# Patient Record
Sex: MALE | Race: WHITE | Employment: OTHER | URBAN - METROPOLITAN AREA
[De-identification: names, ages, dates, MRNs, and addresses within clinical notes are randomized per-mention and may not be internally consistent; named-entity substitution may affect disease eponyms.]

---

## 2017-10-19 ENCOUNTER — OFFICE VISIT (OUTPATIENT)
Dept: FAMILY MEDICINE CLINIC | Facility: CLINIC | Age: 57
End: 2017-10-19

## 2017-10-19 ENCOUNTER — LAB ENCOUNTER (OUTPATIENT)
Dept: LAB | Age: 57
End: 2017-10-19
Attending: FAMILY MEDICINE
Payer: COMMERCIAL

## 2017-10-19 ENCOUNTER — TELEPHONE (OUTPATIENT)
Dept: FAMILY MEDICINE CLINIC | Facility: CLINIC | Age: 57
End: 2017-10-19

## 2017-10-19 VITALS
SYSTOLIC BLOOD PRESSURE: 130 MMHG | OXYGEN SATURATION: 99 % | RESPIRATION RATE: 20 BRPM | TEMPERATURE: 99 F | HEART RATE: 80 BPM | DIASTOLIC BLOOD PRESSURE: 86 MMHG | HEIGHT: 71 IN | WEIGHT: 216 LBS | BODY MASS INDEX: 30.24 KG/M2

## 2017-10-19 DIAGNOSIS — Z13.0 SCREENING FOR ENDOCRINE, NUTRITIONAL, METABOLIC AND IMMUNITY DISORDER: ICD-10-CM

## 2017-10-19 DIAGNOSIS — Z13.21 SCREENING FOR ENDOCRINE, NUTRITIONAL, METABOLIC AND IMMUNITY DISORDER: ICD-10-CM

## 2017-10-19 DIAGNOSIS — Z13.29 SCREENING FOR ENDOCRINE, NUTRITIONAL, METABOLIC AND IMMUNITY DISORDER: ICD-10-CM

## 2017-10-19 DIAGNOSIS — Z13.228 SCREENING FOR ENDOCRINE, NUTRITIONAL, METABOLIC AND IMMUNITY DISORDER: ICD-10-CM

## 2017-10-19 DIAGNOSIS — Z00.00 ROUTINE GENERAL MEDICAL EXAMINATION AT A HEALTH CARE FACILITY: Primary | ICD-10-CM

## 2017-10-19 PROBLEM — I10 BENIGN ESSENTIAL HTN: Status: ACTIVE | Noted: 2017-10-19

## 2017-10-19 PROCEDURE — 36415 COLL VENOUS BLD VENIPUNCTURE: CPT | Performed by: FAMILY MEDICINE

## 2017-10-19 PROCEDURE — 80061 LIPID PANEL: CPT | Performed by: FAMILY MEDICINE

## 2017-10-19 PROCEDURE — 84153 ASSAY OF PSA TOTAL: CPT | Performed by: FAMILY MEDICINE

## 2017-10-19 PROCEDURE — 99386 PREV VISIT NEW AGE 40-64: CPT | Performed by: FAMILY MEDICINE

## 2017-10-19 PROCEDURE — 80050 GENERAL HEALTH PANEL: CPT | Performed by: FAMILY MEDICINE

## 2017-10-19 RX ORDER — NEBIVOLOL 5 MG/1
5 TABLET ORAL DAILY
Qty: 52 TABLET | Refills: 0 | COMMUNITY
Start: 2017-10-19 | End: 2017-10-23 | Stop reason: ALTCHOICE

## 2017-10-19 RX ORDER — NEBIVOLOL 5 MG/1
5 TABLET ORAL DAILY
Qty: 90 TABLET | Refills: 3 | Status: SHIPPED | OUTPATIENT
Start: 2017-10-19 | End: 2017-10-23 | Stop reason: ALTCHOICE

## 2017-10-19 RX ORDER — ASPIRIN 81 MG/1
81 TABLET ORAL DAILY
Qty: 30 TABLET | Refills: 11 | COMMUNITY
Start: 2017-10-19 | End: 2021-05-04

## 2017-10-19 NOTE — TELEPHONE ENCOUNTER
Noted. Will courtney for f/u to request records be sent tomorrow. Dr. Teddy Lutz M.D.     WebsiteDirections  Surgeon in [facility]  Address: [address] [place] [address] [address] [place], [address]  Phone: [phone]

## 2017-10-19 NOTE — PATIENT INSTRUCTIONS
Dr. Mateo Perez DDS. Address: [address] [place], [place] [address] [place], [address]   Phone: [phone]  High Blood Pressure, New, Begin Treatment  Your blood pressure was high enough today to start treatment with medicines.  Often health care providers don’t ¨ Don’t eat foods that have a lot of salt. These include olives, pickles, smoked meats, and salted potato chips. ¨ Don’t add salt to your food at the table. ¨ Use only small amounts of salt when cooking.   ¨ Review food labels to track how much salt is in The American Heart Association recommends the following guidelines for home blood pressure monitoring:  · Don't smoke or drink coffee for 30 minutes before taking your blood pressure.   · Go to the bathroom before the test.  · Relax for 5 minutes before natasha Because a new blood pressure medicine was started today, it’s important that you have your blood pressure rechecked. This is to make sure that the medicine is working and that you have no serious side effects. Keep all your follow up appointments.  Write do If you have high blood pressure, you may not have any symptoms. If you do have symptoms, they may include headache, dizziness, changes in your vision, chest pain, and shortness of breath.  But even without symptoms, high blood pressure that’s not treated ra · Don’t take medicines that stimulate the heart. This includes many over-the-counter cold and sinus decongestant pills and sprays, as well as diet pills. Check the warnings about hypertension on the label.  Before buying any over-the-counter medicines or peck · Sit with your back supported (don't sit on a couch or soft chair); keep your feet on the floor uncrossed. Place your arm on a solid flat surface (like a table) with the upper part of the arm at heart level.  Place the middle of the cuff directly above the · You have problems speaking or seeing   Date Last Reviewed: 12/1/2016  © 6556-1796 [employer]. [address] [facility], [address] [place] [address]. All rights reserved. This information is not intended as a substitute for professional medical care.  A Avoid: Processed cheese and cheese spreads;  Roquefort, Camembert, and cottage cheese; buttermilk, instant breakfast drink  Desserts  Ok: Ice cream, frozen yogurt, juice bars, gelatin, cookies and pies, sugar, honey, jelly, hard candy  Avoid: Most pies, cak

## 2017-10-19 NOTE — TELEPHONE ENCOUNTER
Pt had his colonoscopy at [facility] w/Dr. Andree Schaffer. Wife stated pt gave wrong info at today's visit.

## 2017-10-19 NOTE — PROGRESS NOTES
Virgen Kim is a 62year old male who presents for a complete physical exam.   HPI:      Patient presents with:  Physical      Patient is present for complete physical. Feels very well. Up to date with dental visits. No hearing problems.  Vaccinations: complaint  PSYCH: no symptoms of depression or anxiety, no insomnia.   HEMATOLOGY: denies hx anemia; denies bruising or excessive bleeding  ENDOCRINE: denies excessive thirst or urination; denies unexpected wt gain or wt loss  ALLERGY/IMM.: denies food or s and agrees to the plan. The patient is asked to return for CPX in 1 year.

## 2017-10-20 ENCOUNTER — TELEPHONE (OUTPATIENT)
Dept: FAMILY MEDICINE CLINIC | Facility: CLINIC | Age: 57
End: 2017-10-20

## 2017-10-20 NOTE — TELEPHONE ENCOUNTER
Discussed results with patient and diet to help control his cholesterol. Patient voiced understanding and had no further questions.

## 2017-10-20 NOTE — TELEPHONE ENCOUNTER
Please contact below Specialist to request pt's Colonoscopy records per Dr. Beatriz Feldman request.  Thank you.

## 2017-10-20 NOTE — TELEPHONE ENCOUNTER
----- Message from Frankie Garcia MD sent at 10/20/2017  7:58 AM CDT -----  Normal results except borderline lipids. Low lipids diet recommended.

## 2017-10-23 ENCOUNTER — TELEPHONE (OUTPATIENT)
Dept: FAMILY MEDICINE CLINIC | Facility: CLINIC | Age: 57
End: 2017-10-23

## 2017-10-23 RX ORDER — LISINOPRIL 20 MG/1
20 TABLET ORAL DAILY
Qty: 90 TABLET | Refills: 4 | Status: SHIPPED | OUTPATIENT
Start: 2017-10-23 | End: 2018-10-26

## 2017-10-23 NOTE — TELEPHONE ENCOUNTER
VM left for patient indicating that prescription changed to Lisinopril 20mg once a day. Script sent to [facility].

## 2017-10-23 NOTE — TELEPHONE ENCOUNTER
Bystolic 5 mg will cost the patient $100 each month is there something this can be changed to that's cost effective for the patient?  Please advise

## 2017-10-24 ENCOUNTER — MED REC SCAN ONLY (OUTPATIENT)
Dept: FAMILY MEDICINE CLINIC | Facility: CLINIC | Age: 57
End: 2017-10-24

## 2017-11-02 NOTE — TELEPHONE ENCOUNTER
Spoke with Breezy Norton at that office and she requested a cover sheet with the information be faxed to their office at [phone]  Awaiting response fax went through

## 2017-11-10 ENCOUNTER — MED REC SCAN ONLY (OUTPATIENT)
Dept: FAMILY MEDICINE CLINIC | Facility: CLINIC | Age: 57
End: 2017-11-10

## 2018-02-01 ENCOUNTER — TELEPHONE (OUTPATIENT)
Dept: FAMILY MEDICINE CLINIC | Facility: CLINIC | Age: 58
End: 2018-02-01

## 2018-02-01 RX ORDER — KETOCONAZOLE 20 MG/G
1 CREAM TOPICAL 2 TIMES DAILY
Qty: 60 G | Refills: 1 | Status: SHIPPED | OUTPATIENT
Start: 2018-02-01 | End: 2018-06-22 | Stop reason: ALTCHOICE

## 2018-02-01 RX ORDER — CLOBETASOL PROPIONATE 0.5 MG/G
1 OINTMENT TOPICAL 2 TIMES DAILY
Qty: 60 G | Refills: 1 | Status: SHIPPED | OUTPATIENT
Start: 2018-02-01 | End: 2018-06-22 | Stop reason: ALTCHOICE

## 2018-02-01 NOTE — TELEPHONE ENCOUNTER
Mild, itchy rash in the elbow. Signed Prescriptions Disp Refills    Clobetasol Propionate 0.05 % External Ointment 60 g 1      Sig: Apply 1 Application topically 2 (two) times daily.         Authorizing Provider: Meme Milian      ketoconazole 2 % Ex

## 2018-06-23 ENCOUNTER — TELEPHONE (OUTPATIENT)
Dept: FAMILY MEDICINE CLINIC | Facility: CLINIC | Age: 58
End: 2018-06-23

## 2018-06-25 NOTE — PROGRESS NOTES
Patient presents with:  Pain: Lt. side of head neck region x 2 months and Lt. abdominal pain         HPI:  Patient complains insomnia. Problems with fall asleep and stay asleep. Started gradualy Last:months.   Stress makes it worse, pt tried:OCT meds did n issues. No tinnitus  NECK: no pain  CHEST: no chest pains  NEURO: no seizures, no confusion, no weakness, no abnormal sensation, no vertigo  GI: no nausea or vomiting.   PSYCH: no depression        EXAM:  /72   Pulse 72   Temp 98.7 °F (37.1 °C) (Oral)

## 2018-08-31 ENCOUNTER — TELEPHONE (OUTPATIENT)
Dept: FAMILY MEDICINE CLINIC | Facility: CLINIC | Age: 58
End: 2018-08-31

## 2018-08-31 NOTE — TELEPHONE ENCOUNTER
Spoke with Dr. Sara Howell with orders to call patients pharmacy for Rx. Xanaz 0.5mg. Dose (1 tob po d prn) #30 tabs with 1 refill. The Pharmacy was contacted and patient was notified.

## 2018-10-26 RX ORDER — LISINOPRIL 20 MG/1
TABLET ORAL
Qty: 90 TABLET | Refills: 0 | Status: SHIPPED | OUTPATIENT
Start: 2018-10-26 | End: 2019-10-07

## 2019-02-28 ENCOUNTER — TELEPHONE (OUTPATIENT)
Dept: FAMILY MEDICINE CLINIC | Facility: CLINIC | Age: 59
End: 2019-02-28

## 2019-02-28 RX ORDER — ALPRAZOLAM 0.25 MG/1
0.25 TABLET ORAL 2 TIMES DAILY PRN
Qty: 30 TABLET | Refills: 5 | Status: SHIPPED
Start: 2019-02-28 | End: 2019-12-06

## 2019-04-01 ENCOUNTER — TELEPHONE (OUTPATIENT)
Dept: FAMILY MEDICINE CLINIC | Facility: CLINIC | Age: 59
End: 2019-04-01

## 2019-04-01 RX ORDER — METHYLPREDNISOLONE 4 MG/1
TABLET ORAL
Qty: 1 KIT | Refills: 0 | Status: SHIPPED | OUTPATIENT
Start: 2019-04-01 | End: 2020-12-17

## 2019-04-01 RX ORDER — DEXTROMETHORPHAN HYDROBROMIDE AND PROMETHAZINE HYDROCHLORIDE 15; 6.25 MG/5ML; MG/5ML
5 SYRUP ORAL 4 TIMES DAILY PRN
Qty: 120 ML | Refills: 0 | Status: SHIPPED | OUTPATIENT
Start: 2019-04-01 | End: 2019-04-08

## 2019-10-08 RX ORDER — ALPRAZOLAM 0.5 MG/1
TABLET ORAL
Qty: 30 TABLET | Refills: 0 | Status: SHIPPED | OUTPATIENT
Start: 2019-10-08 | End: 2020-03-19

## 2019-10-08 RX ORDER — ZOLPIDEM TARTRATE 10 MG/1
TABLET ORAL
Qty: 30 TABLET | Refills: 0 | Status: SHIPPED | OUTPATIENT
Start: 2019-10-08 | End: 2021-05-04

## 2019-10-08 RX ORDER — LISINOPRIL 20 MG/1
TABLET ORAL
Qty: 90 TABLET | Refills: 0 | Status: SHIPPED | OUTPATIENT
Start: 2019-10-08 | End: 2020-12-17

## 2019-10-08 NOTE — TELEPHONE ENCOUNTER
Please contact patient and schedule OV. Patient has not been seen since 2018. Patient is overdue. Please schedule patient.

## 2019-10-08 NOTE — TELEPHONE ENCOUNTER
Spoke with patient informed him medications were approved and sent to [facility] with no refills.  Patient scheduled his physical appointment for 10/17/19

## 2019-12-09 RX ORDER — ALPRAZOLAM 0.25 MG/1
TABLET ORAL
Qty: 30 TABLET | Refills: 0 | Status: SHIPPED | OUTPATIENT
Start: 2019-12-09 | End: 2021-05-04 | Stop reason: DRUGHIGH

## 2019-12-09 RX ORDER — LISINOPRIL 20 MG/1
TABLET ORAL
Qty: 90 TABLET | Refills: 0 | Status: SHIPPED | OUTPATIENT
Start: 2019-12-09 | End: 2020-03-19

## 2019-12-09 NOTE — TELEPHONE ENCOUNTER
Medication(s) to Refill:   Requested Prescriptions     Pending Prescriptions Disp Refills   • ALPRAZOLAM 0.25 MG Oral Tab [Pharmacy Med Name: ALPRAZOLAM 0.25MG TABLETS] 30 tablet 0     Sig: TAKE 1 TABLET BY MOUTH TWICE DAILY AS NEEDED FOR SLEEP   • LISINOP

## 2020-03-19 RX ORDER — LISINOPRIL 20 MG/1
TABLET ORAL
Qty: 90 TABLET | Refills: 0 | Status: SHIPPED | OUTPATIENT
Start: 2020-03-19 | End: 2020-06-15

## 2020-03-19 RX ORDER — ALPRAZOLAM 0.5 MG/1
TABLET ORAL
Qty: 30 TABLET | Refills: 0 | Status: SHIPPED | OUTPATIENT
Start: 2020-03-19 | End: 2020-10-28

## 2020-03-19 NOTE — TELEPHONE ENCOUNTER
Medication(s) to Refill:   Requested Prescriptions     Pending Prescriptions Disp Refills   • LISINOPRIL 20 MG Oral Tab [Pharmacy Med Name: LISINOPRIL 20MG TABLETS] 90 tablet 0     Sig: TAKE 1 TABLET(20 MG) BY MOUTH DAILY   • ALPRAZOLAM 0.5 MG Oral Tab [Ph

## 2020-06-12 RX ORDER — LISINOPRIL 20 MG/1
TABLET ORAL
Qty: 90 TABLET | Refills: 0 | OUTPATIENT
Start: 2020-06-12

## 2020-06-15 RX ORDER — LISINOPRIL 20 MG/1
TABLET ORAL
Qty: 90 TABLET | Refills: 0 | Status: SHIPPED | OUTPATIENT
Start: 2020-06-15 | End: 2020-09-14

## 2020-06-15 NOTE — TELEPHONE ENCOUNTER
Medication(s) to Refill:   Requested Prescriptions     Pending Prescriptions Disp Refills   • LISINOPRIL 20 MG Oral Tab [Pharmacy Med Name: LISINOPRIL 20MG TABLETS] 90 tablet 0     Sig: TAKE 1 TABLET BY MOUTH DAILY         Reason for Medication Refill bein

## 2020-09-14 RX ORDER — LISINOPRIL 20 MG/1
TABLET ORAL
Qty: 90 TABLET | Refills: 0 | Status: SHIPPED | OUTPATIENT
Start: 2020-09-14 | End: 2020-12-17

## 2020-10-28 NOTE — TELEPHONE ENCOUNTER
Medication(s) to Refill:   Requested Prescriptions     Pending Prescriptions Disp Refills   • ALPRAZolam 0.5 MG Oral Tab 30 tablet 0     Sig: Take 1 tablet (0.5 mg total) by mouth daily as needed.          Reason for Medication Refill being sent to Provider

## 2020-11-02 RX ORDER — ALPRAZOLAM 0.5 MG/1
0.5 TABLET ORAL
Qty: 30 TABLET | Refills: 0 | Status: SHIPPED | OUTPATIENT
Start: 2020-11-02 | End: 2020-12-17

## 2020-12-14 RX ORDER — LISINOPRIL 20 MG/1
20 TABLET ORAL DAILY
Qty: 30 TABLET | Refills: 0 | OUTPATIENT
Start: 2020-12-14

## 2020-12-17 ENCOUNTER — VIRTUAL PHONE E/M (OUTPATIENT)
Dept: FAMILY MEDICINE CLINIC | Facility: CLINIC | Age: 60
End: 2020-12-17

## 2020-12-17 DIAGNOSIS — F41.1 GAD (GENERALIZED ANXIETY DISORDER): ICD-10-CM

## 2020-12-17 DIAGNOSIS — I10 HYPERTENSION, UNSPECIFIED TYPE: Primary | ICD-10-CM

## 2020-12-17 PROCEDURE — 99441 PHONE E/M BY PHYS 5-10 MIN: CPT | Performed by: NURSE PRACTITIONER

## 2020-12-17 RX ORDER — ALPRAZOLAM 0.5 MG/1
0.5 TABLET ORAL
Qty: 30 TABLET | Refills: 0 | Status: SHIPPED | OUTPATIENT
Start: 2020-12-17 | End: 2021-05-07

## 2020-12-17 RX ORDER — LISINOPRIL 20 MG/1
20 TABLET ORAL DAILY
Qty: 90 TABLET | Refills: 3 | Status: SHIPPED | OUTPATIENT
Start: 2020-12-17 | End: 2021-03-10 | Stop reason: SINTOL

## 2020-12-17 NOTE — PROGRESS NOTES
Virtual Telephone Check-In    Kristi Mata verbally consents to a Virtual/Telephone Check-In visit on 12/17/20. Patient has been referred to the [facility] website at [email or web address] to review the yearly Consent to Treat document.     Patient unders surgical history on file.    Social History:    Social History    Tobacco Use      Smoking status: Former Smoker        Years: 30.00      Smokeless tobacco: Never Used    Alcohol use: Yes      Comment: social    Drug use: No        REVIEW OF SYSTEMS:   GENE

## 2021-01-01 NOTE — TELEPHONE ENCOUNTER
Bella Karissa is here for Follow up evaluation of potential COVID-19 exposure.        You may return to work with no restrictions.                Off work end date: 01/01/21          TM received Negative COVID-19 test results from [facility] on 12/30 and emailed them to [email or web address]. TM states symptoms have improved. TM cleared to RTW.    Antigens #2 and #3 scheduled for 1/2 and 1/5 at [facility]. Emailed TM and  letter.     14 day Symptom Tracker end date: 1/11    F/U 1/3        Medication(s) to Refill:   Requested Prescriptions     Pending Prescriptions Disp Refills   • ALPRAZOLAM 0.5 MG Oral Tab [Pharmacy Med Name: ALPRAZOLAM 0.5MG TABLETS] 30 tablet 0     Sig: TAKE 1 TABLET BY MOUTH DAILY AS NEEDED   • ZOLPIDEM TARTRATE 10 MG O

## 2021-02-10 ENCOUNTER — VIRTUAL PHONE E/M (OUTPATIENT)
Dept: FAMILY MEDICINE CLINIC | Facility: CLINIC | Age: 61
End: 2021-02-10
Payer: COMMERCIAL

## 2021-02-10 DIAGNOSIS — Z20.822 SUSPECTED COVID-19 VIRUS INFECTION: Primary | ICD-10-CM

## 2021-02-10 DIAGNOSIS — Z13.21 ENCOUNTER FOR VITAMIN DEFICIENCY SCREENING: ICD-10-CM

## 2021-02-10 DIAGNOSIS — I10 HYPERTENSION, UNSPECIFIED TYPE: ICD-10-CM

## 2021-02-10 DIAGNOSIS — R05.9 COUGH: ICD-10-CM

## 2021-02-10 PROCEDURE — 99212 OFFICE O/P EST SF 10 MIN: CPT | Performed by: NURSE PRACTITIONER

## 2021-02-10 RX ORDER — BENZONATATE 200 MG/1
200 CAPSULE ORAL 3 TIMES DAILY PRN
Qty: 60 CAPSULE | Refills: 0 | Status: SHIPPED | OUTPATIENT
Start: 2021-02-10 | End: 2021-05-04 | Stop reason: ALTCHOICE

## 2021-02-10 RX ORDER — AZITHROMYCIN 250 MG/1
TABLET, FILM COATED ORAL
Qty: 6 TABLET | Refills: 0 | Status: SHIPPED | OUTPATIENT
Start: 2021-02-10 | End: 2021-02-15

## 2021-02-10 NOTE — PROGRESS NOTES
Virtual Telephone Check-In    Avis Ear verbally consents to a Virtual/Telephone Check-In visit on 02/10/21. Patient has been referred to the [facility] website at [email or web address] to review the yearly Consent to Treat document.     Patient unders ASSESSMENT/ PLAN:       Diagnoses and all orders for this visit:    Suspected COVID-19 virus infection  -     azithromycin (ZITHROMAX Z-EVELIA) 250 MG Oral Tab;  Take 2 tablets (500 mg total) by mouth daily for 1 day, THEN 1 tablet (250 mg total) irasema

## 2021-02-11 ENCOUNTER — LAB ENCOUNTER (OUTPATIENT)
Dept: LAB | Age: 61
End: 2021-02-11
Attending: NURSE PRACTITIONER
Payer: COMMERCIAL

## 2021-02-11 ENCOUNTER — HOSPITAL ENCOUNTER (OUTPATIENT)
Dept: GENERAL RADIOLOGY | Age: 61
Discharge: HOME | End: 2021-02-11
Attending: NURSE PRACTITIONER
Payer: COMMERCIAL

## 2021-02-11 DIAGNOSIS — Z13.21 ENCOUNTER FOR VITAMIN DEFICIENCY SCREENING: ICD-10-CM

## 2021-02-11 DIAGNOSIS — I10 HYPERTENSION, UNSPECIFIED TYPE: ICD-10-CM

## 2021-02-11 DIAGNOSIS — R05.9 COUGH: ICD-10-CM

## 2021-02-11 LAB
ALBUMIN SERPL-MCNC: 3.7 G/DL (ref 3.4–5)
ALBUMIN/GLOB SERPL: 1 {RATIO} (ref 1–2)
ALP LIVER SERPL-CCNC: 73 U/L
ALT SERPL-CCNC: 64 U/L
ANION GAP SERPL CALC-SCNC: 8 MMOL/L (ref 0–18)
AST SERPL-CCNC: 41 U/L (ref 15–37)
BASOPHILS # BLD AUTO: 0.02 X10(3) UL (ref 0–0.2)
BASOPHILS NFR BLD AUTO: 0.3 %
BILIRUB SERPL-MCNC: 0.8 MG/DL (ref 0.1–2)
BUN BLD-MCNC: 14 MG/DL (ref 7–18)
BUN/CREAT SERPL: 16.1 (ref 10–20)
CALCIUM BLD-MCNC: 8.8 MG/DL (ref 8.5–10.1)
CHLORIDE SERPL-SCNC: 108 MMOL/L (ref 98–112)
CO2 SERPL-SCNC: 21 MMOL/L (ref 21–32)
CREAT BLD-MCNC: 0.87 MG/DL
DEPRECATED RDW RBC AUTO: 43.9 FL (ref 35.1–46.3)
EOSINOPHIL # BLD AUTO: 0.3 X10(3) UL (ref 0–0.7)
EOSINOPHIL NFR BLD AUTO: 4.8 %
ERYTHROCYTE [DISTWIDTH] IN BLOOD BY AUTOMATED COUNT: 12.8 % (ref 11–15)
FOLATE SERPL-MCNC: 15.3 NG/ML (ref 8.7–?)
GLOBULIN PLAS-MCNC: 3.7 G/DL (ref 2.8–4.4)
GLUCOSE BLD-MCNC: 99 MG/DL (ref 70–99)
HCT VFR BLD AUTO: 48.4 %
HGB BLD-MCNC: 17.1 G/DL
IMM GRANULOCYTES # BLD AUTO: 0.01 X10(3) UL (ref 0–1)
IMM GRANULOCYTES NFR BLD: 0.2 %
LYMPHOCYTES # BLD AUTO: 1.21 X10(3) UL (ref 1–4)
LYMPHOCYTES NFR BLD AUTO: 19.2 %
M PROTEIN MFR SERPL ELPH: 7.4 G/DL (ref 6.4–8.2)
MCH RBC QN AUTO: 33.3 PG (ref 26–34)
MCHC RBC AUTO-ENTMCNC: 35.3 G/DL (ref 31–37)
MCV RBC AUTO: 94.2 FL
MONOCYTES # BLD AUTO: 0.55 X10(3) UL (ref 0.1–1)
MONOCYTES NFR BLD AUTO: 8.7 %
NEUTROPHILS # BLD AUTO: 4.2 X10 (3) UL (ref 1.5–7.7)
NEUTROPHILS # BLD AUTO: 4.2 X10(3) UL (ref 1.5–7.7)
NEUTROPHILS NFR BLD AUTO: 66.8 %
OSMOLALITY SERPL CALC.SUM OF ELEC: 285 MOSM/KG (ref 275–295)
PATIENT FASTING Y/N/NP: YES
PLATELET # BLD AUTO: 208 10(3)UL (ref 150–450)
POTASSIUM SERPL-SCNC: 4.1 MMOL/L (ref 3.5–5.1)
RBC # BLD AUTO: 5.14 X10(6)UL
SODIUM SERPL-SCNC: 137 MMOL/L (ref 136–145)
TSI SER-ACNC: 1.25 MIU/ML (ref 0.36–3.74)
VIT B12 SERPL-MCNC: 291 PG/ML (ref 193–986)
VIT D+METAB SERPL-MCNC: 14.9 NG/ML (ref 30–100)
WBC # BLD AUTO: 6.3 X10(3) UL (ref 4–11)

## 2021-02-11 PROCEDURE — 36415 COLL VENOUS BLD VENIPUNCTURE: CPT

## 2021-02-11 PROCEDURE — 82306 VITAMIN D 25 HYDROXY: CPT

## 2021-02-11 PROCEDURE — 84443 ASSAY THYROID STIM HORMONE: CPT

## 2021-02-11 PROCEDURE — 80053 COMPREHEN METABOLIC PANEL: CPT

## 2021-02-11 PROCEDURE — 82746 ASSAY OF FOLIC ACID SERUM: CPT

## 2021-02-11 PROCEDURE — 71046 X-RAY EXAM CHEST 2 VIEWS: CPT | Performed by: NURSE PRACTITIONER

## 2021-02-11 PROCEDURE — 82607 VITAMIN B-12: CPT

## 2021-02-11 PROCEDURE — 85025 COMPLETE CBC W/AUTO DIFF WBC: CPT

## 2021-02-12 ENCOUNTER — TELEPHONE (OUTPATIENT)
Dept: FAMILY MEDICINE CLINIC | Facility: CLINIC | Age: 61
End: 2021-02-12

## 2021-02-12 LAB — SARS-COV-2 RNA RESP QL NAA+PROBE: NOT DETECTED

## 2021-02-12 NOTE — TELEPHONE ENCOUNTER
----- Message from TRISHA Shaw sent at 2/11/2021 11:51 AM CST -----  Normal Chest Xray -no acute findings

## 2021-02-12 NOTE — TELEPHONE ENCOUNTER
Called pt and left vm of chest xray results. Instructed to contact office with any questions. Office number provided.

## 2021-02-15 ENCOUNTER — TELEPHONE (OUTPATIENT)
Dept: FAMILY MEDICINE CLINIC | Facility: CLINIC | Age: 61
End: 2021-02-15

## 2021-02-15 NOTE — TELEPHONE ENCOUNTER
Patient informed of results and instructions below. Patient states understanding and did not have any additional questions or concerns. Patient was sent to phone room to schedule Vitamin B12 injections.

## 2021-02-15 NOTE — TELEPHONE ENCOUNTER
----- Message from TRISHA Busby sent at 2/15/2021  7:58 AM CST -----  Vitamin D deficiency -sent new prescription for vitamin D -take it once per week , Vitamin B12 low -can start injections  In the office - daily x 5 days , then weekly x 4 weeks ,

## 2021-02-18 ENCOUNTER — NURSE ONLY (OUTPATIENT)
Dept: FAMILY MEDICINE CLINIC | Facility: CLINIC | Age: 61
End: 2021-02-18
Payer: COMMERCIAL

## 2021-02-18 DIAGNOSIS — E53.8 B12 DEFICIENCY: Primary | ICD-10-CM

## 2021-02-18 PROCEDURE — 96372 THER/PROPH/DIAG INJ SC/IM: CPT | Performed by: NURSE PRACTITIONER

## 2021-02-18 RX ORDER — CYANOCOBALAMIN 1000 UG/ML
1000 INJECTION INTRAMUSCULAR; SUBCUTANEOUS ONCE
Status: COMPLETED | OUTPATIENT
Start: 2021-02-18 | End: 2021-02-18

## 2021-02-18 RX ADMIN — CYANOCOBALAMIN 1000 MCG: 1000 INJECTION INTRAMUSCULAR; SUBCUTANEOUS at 14:29:00

## 2021-02-19 ENCOUNTER — NURSE ONLY (OUTPATIENT)
Dept: FAMILY MEDICINE CLINIC | Facility: CLINIC | Age: 61
End: 2021-02-19
Payer: COMMERCIAL

## 2021-02-19 DIAGNOSIS — E53.8 B12 DEFICIENCY: Primary | ICD-10-CM

## 2021-02-19 PROCEDURE — 96372 THER/PROPH/DIAG INJ SC/IM: CPT | Performed by: EMERGENCY MEDICINE

## 2021-02-19 RX ORDER — CYANOCOBALAMIN 1000 UG/ML
1000 INJECTION INTRAMUSCULAR; SUBCUTANEOUS ONCE
Status: COMPLETED | OUTPATIENT
Start: 2021-02-19 | End: 2021-02-19

## 2021-02-19 RX ADMIN — CYANOCOBALAMIN 1000 MCG: 1000 INJECTION INTRAMUSCULAR; SUBCUTANEOUS at 14:48:00

## 2021-02-22 ENCOUNTER — NURSE ONLY (OUTPATIENT)
Dept: FAMILY MEDICINE CLINIC | Facility: CLINIC | Age: 61
End: 2021-02-22
Payer: COMMERCIAL

## 2021-02-22 DIAGNOSIS — E53.8 B12 DEFICIENCY: Primary | ICD-10-CM

## 2021-02-22 PROCEDURE — 96372 THER/PROPH/DIAG INJ SC/IM: CPT | Performed by: FAMILY MEDICINE

## 2021-02-22 RX ORDER — CYANOCOBALAMIN 1000 UG/ML
1000 INJECTION INTRAMUSCULAR; SUBCUTANEOUS ONCE
Status: COMPLETED | OUTPATIENT
Start: 2021-02-22 | End: 2021-02-22

## 2021-02-22 RX ADMIN — CYANOCOBALAMIN 1000 MCG: 1000 INJECTION INTRAMUSCULAR; SUBCUTANEOUS at 14:21:00

## 2021-02-23 ENCOUNTER — NURSE ONLY (OUTPATIENT)
Dept: FAMILY MEDICINE CLINIC | Facility: CLINIC | Age: 61
End: 2021-02-23
Payer: COMMERCIAL

## 2021-02-23 DIAGNOSIS — E53.8 B12 DEFICIENCY: Primary | ICD-10-CM

## 2021-02-23 PROCEDURE — 96372 THER/PROPH/DIAG INJ SC/IM: CPT | Performed by: FAMILY MEDICINE

## 2021-02-23 RX ORDER — CYANOCOBALAMIN 1000 UG/ML
1000 INJECTION INTRAMUSCULAR; SUBCUTANEOUS ONCE
Status: COMPLETED | OUTPATIENT
Start: 2021-02-23 | End: 2021-02-23

## 2021-02-23 RX ADMIN — CYANOCOBALAMIN 1000 MCG: 1000 INJECTION INTRAMUSCULAR; SUBCUTANEOUS at 14:37:00

## 2021-02-24 ENCOUNTER — NURSE ONLY (OUTPATIENT)
Dept: FAMILY MEDICINE CLINIC | Facility: CLINIC | Age: 61
End: 2021-02-24
Payer: COMMERCIAL

## 2021-02-24 DIAGNOSIS — E53.8 B12 DEFICIENCY: Primary | ICD-10-CM

## 2021-02-24 PROCEDURE — 96372 THER/PROPH/DIAG INJ SC/IM: CPT | Performed by: FAMILY MEDICINE

## 2021-02-24 RX ORDER — CYANOCOBALAMIN 1000 UG/ML
1000 INJECTION INTRAMUSCULAR; SUBCUTANEOUS ONCE
Status: COMPLETED | OUTPATIENT
Start: 2021-02-24 | End: 2021-02-24

## 2021-02-24 RX ADMIN — CYANOCOBALAMIN 1000 MCG: 1000 INJECTION INTRAMUSCULAR; SUBCUTANEOUS at 14:13:00

## 2021-03-03 ENCOUNTER — NURSE ONLY (OUTPATIENT)
Dept: FAMILY MEDICINE CLINIC | Facility: CLINIC | Age: 61
End: 2021-03-03
Payer: COMMERCIAL

## 2021-03-03 DIAGNOSIS — E53.8 B12 DEFICIENCY: Primary | ICD-10-CM

## 2021-03-03 PROCEDURE — 96372 THER/PROPH/DIAG INJ SC/IM: CPT | Performed by: NURSE PRACTITIONER

## 2021-03-03 RX ORDER — CYANOCOBALAMIN 1000 UG/ML
1000 INJECTION INTRAMUSCULAR; SUBCUTANEOUS ONCE
Status: COMPLETED | OUTPATIENT
Start: 2021-03-03 | End: 2021-03-03

## 2021-03-03 RX ADMIN — CYANOCOBALAMIN 1000 MCG: 1000 INJECTION INTRAMUSCULAR; SUBCUTANEOUS at 14:36:00

## 2021-03-10 ENCOUNTER — VIRTUAL PHONE E/M (OUTPATIENT)
Dept: FAMILY MEDICINE CLINIC | Facility: CLINIC | Age: 61
End: 2021-03-10
Payer: COMMERCIAL

## 2021-03-10 DIAGNOSIS — J20.9 ACUTE BRONCHITIS, UNSPECIFIED ORGANISM: Primary | ICD-10-CM

## 2021-03-10 DIAGNOSIS — I10 HYPERTENSION, UNSPECIFIED TYPE: ICD-10-CM

## 2021-03-10 PROCEDURE — 99212 OFFICE O/P EST SF 10 MIN: CPT | Performed by: NURSE PRACTITIONER

## 2021-03-10 RX ORDER — LOSARTAN POTASSIUM 25 MG/1
25 TABLET ORAL DAILY
Qty: 30 TABLET | Refills: 0 | Status: SHIPPED | OUTPATIENT
Start: 2021-03-10 | End: 2021-04-05

## 2021-03-10 RX ORDER — MONTELUKAST SODIUM 10 MG/1
10 TABLET ORAL NIGHTLY
Qty: 30 TABLET | Refills: 0 | Status: SHIPPED | OUTPATIENT
Start: 2021-03-10 | End: 2021-04-05

## 2021-03-10 RX ORDER — FLUTICASONE PROPIONATE 50 MCG
2 SPRAY, SUSPENSION (ML) NASAL DAILY
Qty: 3 BOTTLE | Refills: 3 | Status: SHIPPED | OUTPATIENT
Start: 2021-03-10 | End: 2021-08-04 | Stop reason: ALTCHOICE

## 2021-03-10 RX ORDER — AZITHROMYCIN 250 MG/1
TABLET, FILM COATED ORAL
Qty: 6 TABLET | Refills: 0 | Status: SHIPPED | OUTPATIENT
Start: 2021-03-10 | End: 2021-03-15

## 2021-03-10 NOTE — PROGRESS NOTES
Telehealth outside of [address] Verbal Consent   I conducted a telehealth visit with Saravanan Rosa today, 03/10/21, which was completed using two-way, real-time interactive audio  communication.  This has been done in good jake to provide cont Current Outpatient Medications   Medication Sig Dispense Refill   • azithromycin (ZITHROMAX Z-EVELIA) 250 MG Oral Tab Take 2 tablets (500 mg total) by mouth daily for 1 day, THEN 1 tablet (250 mg total) daily for 4 days.  6 tablet 0   • Losartan Potass 2 tablets (500 mg total) by mouth daily for 1 day, THEN 1 tablet (250 mg total) daily for 4 days.  -     Montelukast Sodium (SINGULAIR) 10 MG Oral Tab; Take 1 tablet (10 mg total) by mouth nightly.   -     Fluticasone Propionate 50 MCG/ACT Nasal Suspension;

## 2021-03-17 ENCOUNTER — NURSE ONLY (OUTPATIENT)
Dept: FAMILY MEDICINE CLINIC | Facility: CLINIC | Age: 61
End: 2021-03-17
Payer: COMMERCIAL

## 2021-03-17 DIAGNOSIS — E53.8 B12 DEFICIENCY: Primary | ICD-10-CM

## 2021-03-17 PROCEDURE — 96372 THER/PROPH/DIAG INJ SC/IM: CPT | Performed by: NURSE PRACTITIONER

## 2021-03-17 RX ORDER — CYANOCOBALAMIN 1000 UG/ML
1000 INJECTION INTRAMUSCULAR; SUBCUTANEOUS ONCE
Status: COMPLETED | OUTPATIENT
Start: 2021-03-17 | End: 2021-03-17

## 2021-03-17 RX ADMIN — CYANOCOBALAMIN 1000 MCG: 1000 INJECTION INTRAMUSCULAR; SUBCUTANEOUS at 14:18:00

## 2021-03-24 ENCOUNTER — NURSE ONLY (OUTPATIENT)
Dept: FAMILY MEDICINE CLINIC | Facility: CLINIC | Age: 61
End: 2021-03-24
Payer: COMMERCIAL

## 2021-03-24 DIAGNOSIS — E53.8 B12 DEFICIENCY: Primary | ICD-10-CM

## 2021-03-24 PROCEDURE — 96372 THER/PROPH/DIAG INJ SC/IM: CPT | Performed by: NURSE PRACTITIONER

## 2021-03-24 RX ORDER — CYANOCOBALAMIN 1000 UG/ML
1000 INJECTION INTRAMUSCULAR; SUBCUTANEOUS ONCE
Status: COMPLETED | OUTPATIENT
Start: 2021-03-24 | End: 2021-03-24

## 2021-03-24 RX ADMIN — CYANOCOBALAMIN 1000 MCG: 1000 INJECTION INTRAMUSCULAR; SUBCUTANEOUS at 14:30:00

## 2021-03-31 ENCOUNTER — NURSE ONLY (OUTPATIENT)
Dept: FAMILY MEDICINE CLINIC | Facility: CLINIC | Age: 61
End: 2021-03-31
Payer: COMMERCIAL

## 2021-03-31 DIAGNOSIS — E53.8 B12 DEFICIENCY: Primary | ICD-10-CM

## 2021-03-31 PROCEDURE — 96372 THER/PROPH/DIAG INJ SC/IM: CPT | Performed by: FAMILY MEDICINE

## 2021-03-31 RX ORDER — CYANOCOBALAMIN 1000 UG/ML
1000 INJECTION INTRAMUSCULAR; SUBCUTANEOUS ONCE
Status: COMPLETED | OUTPATIENT
Start: 2021-03-31 | End: 2021-03-31

## 2021-03-31 RX ADMIN — CYANOCOBALAMIN 1000 MCG: 1000 INJECTION INTRAMUSCULAR; SUBCUTANEOUS at 15:11:00

## 2021-04-04 DIAGNOSIS — I10 HYPERTENSION, UNSPECIFIED TYPE: ICD-10-CM

## 2021-04-04 DIAGNOSIS — J20.9 ACUTE BRONCHITIS, UNSPECIFIED ORGANISM: ICD-10-CM

## 2021-04-05 RX ORDER — LOSARTAN POTASSIUM 25 MG/1
TABLET ORAL
Qty: 30 TABLET | Refills: 0 | Status: SHIPPED | OUTPATIENT
Start: 2021-04-05 | End: 2021-04-16 | Stop reason: DRUGHIGH

## 2021-04-05 RX ORDER — MONTELUKAST SODIUM 10 MG/1
TABLET ORAL
Qty: 30 TABLET | Refills: 0 | Status: SHIPPED | OUTPATIENT
Start: 2021-04-05 | End: 2021-05-04 | Stop reason: ALTCHOICE

## 2021-04-05 NOTE — TELEPHONE ENCOUNTER
Medication(s) to Refill:   Requested Prescriptions     Pending Prescriptions Disp Refills   • LOSARTAN POTASSIUM 25 MG Oral Tab [Pharmacy Med Name: LOSARTAN 25MG TABLETS] 30 tablet 0     Sig: TAKE 1 TABLET(25 MG) BY MOUTH DAILY   • MONTELUKAST SODIUM 10 MG

## 2021-04-16 ENCOUNTER — TELEPHONE (OUTPATIENT)
Dept: FAMILY MEDICINE CLINIC | Facility: CLINIC | Age: 61
End: 2021-04-16

## 2021-04-16 RX ORDER — LOSARTAN POTASSIUM 50 MG/1
50 TABLET ORAL DAILY
Qty: 30 TABLET | Refills: 1 | Status: SHIPPED | OUTPATIENT
Start: 2021-04-16 | End: 2021-05-04 | Stop reason: DRUGHIGH

## 2021-04-28 ENCOUNTER — NURSE ONLY (OUTPATIENT)
Dept: FAMILY MEDICINE CLINIC | Facility: CLINIC | Age: 61
End: 2021-04-28
Payer: COMMERCIAL

## 2021-04-28 DIAGNOSIS — E53.8 B12 DEFICIENCY: Primary | ICD-10-CM

## 2021-04-28 PROCEDURE — 96372 THER/PROPH/DIAG INJ SC/IM: CPT | Performed by: FAMILY MEDICINE

## 2021-04-28 RX ORDER — CYANOCOBALAMIN 1000 UG/ML
1000 INJECTION INTRAMUSCULAR; SUBCUTANEOUS ONCE
Status: COMPLETED | OUTPATIENT
Start: 2021-04-28 | End: 2021-04-28

## 2021-04-28 RX ADMIN — CYANOCOBALAMIN 1000 MCG: 1000 INJECTION INTRAMUSCULAR; SUBCUTANEOUS at 14:44:00

## 2021-05-03 ENCOUNTER — TELEPHONE (OUTPATIENT)
Dept: FAMILY MEDICINE CLINIC | Facility: CLINIC | Age: 61
End: 2021-05-03

## 2021-05-03 NOTE — TELEPHONE ENCOUNTER
Patient Marti Jann calling to report that his BP readings are still high, averaging 150/110 despite patient 100mg losartan daily for last 5 days.   Please call pt to advise

## 2021-05-03 NOTE — TELEPHONE ENCOUNTER
Pt states his BP is continues to run high despite increasing Losartan. He has been taking 100mg every day & BP is still 150/110. He feels that it is elevated, mild headache, light-headed. ER precautions discussed, pt declines ER at this time.   Pt states

## 2021-05-04 ENCOUNTER — OFFICE VISIT (OUTPATIENT)
Dept: FAMILY MEDICINE CLINIC | Facility: CLINIC | Age: 61
End: 2021-05-04
Payer: COMMERCIAL

## 2021-05-04 VITALS
DIASTOLIC BLOOD PRESSURE: 84 MMHG | WEIGHT: 230 LBS | HEIGHT: 71 IN | SYSTOLIC BLOOD PRESSURE: 132 MMHG | TEMPERATURE: 98 F | BODY MASS INDEX: 32.2 KG/M2 | HEART RATE: 88 BPM

## 2021-05-04 DIAGNOSIS — R07.9 CHEST PAIN, UNSPECIFIED TYPE: ICD-10-CM

## 2021-05-04 DIAGNOSIS — E56.9 VITAMIN DEFICIENCY: ICD-10-CM

## 2021-05-04 DIAGNOSIS — Z87.891 HISTORY OF SMOKING: ICD-10-CM

## 2021-05-04 DIAGNOSIS — R10.12 LUQ PAIN: ICD-10-CM

## 2021-05-04 DIAGNOSIS — R05.3 CHRONIC COUGH: ICD-10-CM

## 2021-05-04 DIAGNOSIS — I10 HYPERTENSION, UNSPECIFIED TYPE: Primary | ICD-10-CM

## 2021-05-04 PROCEDURE — 3008F BODY MASS INDEX DOCD: CPT | Performed by: NURSE PRACTITIONER

## 2021-05-04 PROCEDURE — 99214 OFFICE O/P EST MOD 30 MIN: CPT | Performed by: NURSE PRACTITIONER

## 2021-05-04 PROCEDURE — 3079F DIAST BP 80-89 MM HG: CPT | Performed by: NURSE PRACTITIONER

## 2021-05-04 PROCEDURE — 93000 ELECTROCARDIOGRAM COMPLETE: CPT | Performed by: NURSE PRACTITIONER

## 2021-05-04 PROCEDURE — 3075F SYST BP GE 130 - 139MM HG: CPT | Performed by: NURSE PRACTITIONER

## 2021-05-04 RX ORDER — LOSARTAN POTASSIUM 100 MG/1
100 TABLET ORAL DAILY
Qty: 90 TABLET | Refills: 1 | Status: SHIPPED | OUTPATIENT
Start: 2021-05-04 | End: 2021-05-13

## 2021-05-04 RX ORDER — PANTOPRAZOLE SODIUM 20 MG/1
20 TABLET, DELAYED RELEASE ORAL
Qty: 30 TABLET | Refills: 3 | Status: SHIPPED | OUTPATIENT
Start: 2021-05-04 | End: 2021-06-03

## 2021-05-04 RX ORDER — ALBUTEROL SULFATE 90 UG/1
2 AEROSOL, METERED RESPIRATORY (INHALATION) EVERY 4 HOURS PRN
Qty: 1 INHALER | Refills: 6 | Status: SHIPPED | OUTPATIENT
Start: 2021-05-04 | End: 2021-08-04 | Stop reason: ALTCHOICE

## 2021-05-04 NOTE — PATIENT INSTRUCTIONS
Abdominal Pain  Abdominal pain is pain in the stomach or belly area. Everyone has this pain from time to time. In many cases it goes away on its own. But abdominal pain can sometimes be due to a serious problem, such as appendicitis.  So it’s important to vitamins, and supplements. Treating abdominal pain  Some causes of pain need emergency medical treatment right away. These include appendicitis or a bowel blockage. Other problems can be treated with rest, fluids, or medicines.  Your healthcare provider ca lie down. · Raise the head of your bed. Gris last reviewed this educational content on 4/1/2019  © 4317-8722 [employer]. All rights reserved. This information is not intended as a substitute for professional medical care.  Always follow

## 2021-05-04 NOTE — PROGRESS NOTES
Prosper Calloway is a 26[address] [facility] old male. HPI:   Patient presents for recheck of his hypertension.  Pt has been taking medications as instructed,  home BP monitoring is around 130/90  Pt denies chest pain, shortness of breath, palpitations, orthopnea, p denies chest pain, palpitations or orthopnea, no edema. GI: denies abdominal pain and denies heartburn  NEURO: denies headaches, abnormal sensation.   EXT: Denies swelling    EXAM:   /84   Pulse 88   Temp 97.9 °F (36.6 °C)   Ht 5' 11\" (1.803 m)   Wt 25-HYDROXY; Future  -     B12 AND FOLATE; Future     f/u as needed   RTC sooner for worsening symptoms     HTN- stable  81mg of ASA, low cholesterol and low salt diet and regular exercise encouraged. All side effects vs benefits d/w the pt.   Medications:

## 2021-05-05 ENCOUNTER — LAB ENCOUNTER (OUTPATIENT)
Dept: LAB | Age: 61
End: 2021-05-05
Attending: NURSE PRACTITIONER
Payer: COMMERCIAL

## 2021-05-05 ENCOUNTER — HOSPITAL ENCOUNTER (OUTPATIENT)
Dept: GENERAL RADIOLOGY | Age: 61
Discharge: HOME | End: 2021-05-05
Attending: NURSE PRACTITIONER
Payer: COMMERCIAL

## 2021-05-05 DIAGNOSIS — Z87.891 HISTORY OF SMOKING: ICD-10-CM

## 2021-05-05 DIAGNOSIS — R10.12 LUQ PAIN: ICD-10-CM

## 2021-05-05 DIAGNOSIS — I10 HYPERTENSION, UNSPECIFIED TYPE: ICD-10-CM

## 2021-05-05 DIAGNOSIS — E56.9 VITAMIN DEFICIENCY: ICD-10-CM

## 2021-05-05 DIAGNOSIS — R05.3 CHRONIC COUGH: ICD-10-CM

## 2021-05-05 PROCEDURE — 83690 ASSAY OF LIPASE: CPT | Performed by: NURSE PRACTITIONER

## 2021-05-05 PROCEDURE — 82607 VITAMIN B-12: CPT | Performed by: NURSE PRACTITIONER

## 2021-05-05 PROCEDURE — 80050 GENERAL HEALTH PANEL: CPT | Performed by: NURSE PRACTITIONER

## 2021-05-05 PROCEDURE — 82306 VITAMIN D 25 HYDROXY: CPT | Performed by: NURSE PRACTITIONER

## 2021-05-05 PROCEDURE — 84484 ASSAY OF TROPONIN QUANT: CPT | Performed by: NURSE PRACTITIONER

## 2021-05-05 PROCEDURE — 82746 ASSAY OF FOLIC ACID SERUM: CPT | Performed by: NURSE PRACTITIONER

## 2021-05-05 PROCEDURE — 71046 X-RAY EXAM CHEST 2 VIEWS: CPT | Performed by: NURSE PRACTITIONER

## 2021-05-05 PROCEDURE — 82150 ASSAY OF AMYLASE: CPT | Performed by: NURSE PRACTITIONER

## 2021-05-06 ENCOUNTER — TELEPHONE (OUTPATIENT)
Dept: FAMILY MEDICINE CLINIC | Facility: CLINIC | Age: 61
End: 2021-05-06

## 2021-05-06 NOTE — TELEPHONE ENCOUNTER
----- Message from Lazarus Scheuermann, APRN sent at 5/6/2021  7:46 AM CDT -----  Negative chest xray

## 2021-05-07 DIAGNOSIS — F41.1 GAD (GENERALIZED ANXIETY DISORDER): ICD-10-CM

## 2021-05-07 DIAGNOSIS — I10 HYPERTENSION, UNSPECIFIED TYPE: ICD-10-CM

## 2021-05-07 RX ORDER — ALPRAZOLAM 0.5 MG/1
0.5 TABLET ORAL
Qty: 30 TABLET | Refills: 0 | Status: SHIPPED | OUTPATIENT
Start: 2021-05-07

## 2021-05-09 ENCOUNTER — HOSPITAL ENCOUNTER (EMERGENCY)
Age: 61
Discharge: HOME | End: 2021-05-09
Attending: EMERGENCY MEDICINE
Payer: COMMERCIAL

## 2021-05-09 ENCOUNTER — APPOINTMENT (OUTPATIENT)
Dept: GENERAL RADIOLOGY | Age: 61
End: 2021-05-09
Attending: EMERGENCY MEDICINE
Payer: COMMERCIAL

## 2021-05-09 ENCOUNTER — APPOINTMENT (OUTPATIENT)
Dept: MRI IMAGING | Age: 61
End: 2021-05-09
Attending: EMERGENCY MEDICINE
Payer: COMMERCIAL

## 2021-05-09 VITALS
RESPIRATION RATE: 18 BRPM | OXYGEN SATURATION: 98 % | DIASTOLIC BLOOD PRESSURE: 99 MMHG | TEMPERATURE: 99 F | WEIGHT: 230 LBS | SYSTOLIC BLOOD PRESSURE: 154 MMHG | BODY MASS INDEX: 32 KG/M2 | HEART RATE: 80 BPM

## 2021-05-09 DIAGNOSIS — I10 ESSENTIAL HYPERTENSION: ICD-10-CM

## 2021-05-09 DIAGNOSIS — R42 ACUTE ONSET OF SEVERE VERTIGO: Primary | ICD-10-CM

## 2021-05-09 DIAGNOSIS — F41.9 ANXIETY: ICD-10-CM

## 2021-05-09 PROCEDURE — 93005 ELECTROCARDIOGRAM TRACING: CPT

## 2021-05-09 PROCEDURE — 80053 COMPREHEN METABOLIC PANEL: CPT | Performed by: EMERGENCY MEDICINE

## 2021-05-09 PROCEDURE — 96374 THER/PROPH/DIAG INJ IV PUSH: CPT

## 2021-05-09 PROCEDURE — A9575 INJ GADOTERATE MEGLUMI 0.1ML: HCPCS | Performed by: EMERGENCY MEDICINE

## 2021-05-09 PROCEDURE — 99285 EMERGENCY DEPT VISIT HI MDM: CPT

## 2021-05-09 PROCEDURE — 71045 X-RAY EXAM CHEST 1 VIEW: CPT | Performed by: EMERGENCY MEDICINE

## 2021-05-09 PROCEDURE — 93010 ELECTROCARDIOGRAM REPORT: CPT

## 2021-05-09 PROCEDURE — 96361 HYDRATE IV INFUSION ADD-ON: CPT

## 2021-05-09 PROCEDURE — 70553 MRI BRAIN STEM W/O & W/DYE: CPT | Performed by: EMERGENCY MEDICINE

## 2021-05-09 PROCEDURE — 96375 TX/PRO/DX INJ NEW DRUG ADDON: CPT

## 2021-05-09 PROCEDURE — 85025 COMPLETE CBC W/AUTO DIFF WBC: CPT | Performed by: EMERGENCY MEDICINE

## 2021-05-09 PROCEDURE — 84484 ASSAY OF TROPONIN QUANT: CPT | Performed by: EMERGENCY MEDICINE

## 2021-05-09 RX ORDER — ONDANSETRON 2 MG/ML
4 INJECTION INTRAMUSCULAR; INTRAVENOUS ONCE
Status: COMPLETED | OUTPATIENT
Start: 2021-05-09 | End: 2021-05-09

## 2021-05-09 RX ORDER — MECLIZINE HYDROCHLORIDE 25 MG/1
25 TABLET ORAL 3 TIMES DAILY PRN
Qty: 30 TABLET | Refills: 0 | Status: SHIPPED | OUTPATIENT
Start: 2021-05-09 | End: 2021-08-04 | Stop reason: ALTCHOICE

## 2021-05-09 RX ORDER — LORAZEPAM 2 MG/ML
1 INJECTION INTRAMUSCULAR ONCE
Status: COMPLETED | OUTPATIENT
Start: 2021-05-09 | End: 2021-05-09

## 2021-05-10 DIAGNOSIS — Z86.19 HISTORY OF VIRAL ILLNESS: Primary | ICD-10-CM

## 2021-05-10 RX ORDER — LOSARTAN POTASSIUM 50 MG/1
TABLET ORAL
Qty: 90 TABLET | Refills: 0 | OUTPATIENT
Start: 2021-05-10

## 2021-05-12 ENCOUNTER — LAB ENCOUNTER (OUTPATIENT)
Dept: LAB | Age: 61
End: 2021-05-12
Attending: FAMILY MEDICINE
Payer: COMMERCIAL

## 2021-05-12 DIAGNOSIS — Z86.19 HISTORY OF VIRAL ILLNESS: ICD-10-CM

## 2021-05-12 PROCEDURE — 86769 SARS-COV-2 COVID-19 ANTIBODY: CPT | Performed by: FAMILY MEDICINE

## 2021-05-13 ENCOUNTER — MED REC SCAN ONLY (OUTPATIENT)
Dept: FAMILY MEDICINE CLINIC | Facility: CLINIC | Age: 61
End: 2021-05-13

## 2021-05-13 ENCOUNTER — PATIENT MESSAGE (OUTPATIENT)
Dept: FAMILY MEDICINE CLINIC | Facility: CLINIC | Age: 61
End: 2021-05-13

## 2021-05-13 ENCOUNTER — TELEPHONE (OUTPATIENT)
Dept: FAMILY MEDICINE CLINIC | Facility: CLINIC | Age: 61
End: 2021-05-13

## 2021-05-13 DIAGNOSIS — K40.21 BILATERAL RECURRENT INGUINAL HERNIA WITHOUT OBSTRUCTION OR GANGRENE: ICD-10-CM

## 2021-05-13 DIAGNOSIS — N40.0 ENLARGED PROSTATE: ICD-10-CM

## 2021-05-13 DIAGNOSIS — R05.3 CHRONIC COUGH: Primary | ICD-10-CM

## 2021-05-13 RX ORDER — LISINOPRIL 20 MG/1
20 TABLET ORAL DAILY
Qty: 90 TABLET | Refills: 0 | Status: SHIPPED | OUTPATIENT
Start: 2021-05-13 | End: 2021-09-01

## 2021-05-13 RX ORDER — AMLODIPINE BESYLATE 5 MG/1
5 TABLET ORAL DAILY
Qty: 90 TABLET | Refills: 0 | Status: SHIPPED | OUTPATIENT
Start: 2021-05-13 | End: 2021-07-28 | Stop reason: ALTCHOICE

## 2021-05-13 NOTE — TELEPHONE ENCOUNTER
----- Message from Tayler Mason MD sent at 5/13/2021 11:36 AM CDT -----  No Ab, I recommend vaccination

## 2021-05-13 NOTE — TELEPHONE ENCOUNTER
----- Message from ERIC Ho FNP-C sent at 5/12/2021 10:54 AM CDT -----  +Hepatic steatosis. Recommend weight loss and low fat diet. Mildly enlarged prostate.  Ok to order PSA and refer to urology if symptomatic (nocturia, dribbling, hesitancy,

## 2021-05-13 NOTE — TELEPHONE ENCOUNTER
----- Message from ERIC Chandra, SHERIDANP-C sent at 5/12/2021 11:01 AM CDT -----  +Pulmonary nodule in left lower lobe. Repeat CT in 6-12 months to assess stability. Refer to pulm, Dr. Dena Decker, for chronic cough.

## 2021-05-13 NOTE — TELEPHONE ENCOUNTER
Regarding: FW: Prescription Question  Contact: [phone]  Sonja Anderson to send back Lisinopril 20mg and Amlodipine 5mg daily, if still high we need to see pt or cardiology.   ----- Message -----  From: Jim Quiroz RN  Sent: 5/13/2021   3:48 PM CDT  To: Jackie Barone Prot

## 2021-05-13 NOTE — TELEPHONE ENCOUNTER
From: Dennis Mcpherson  To: Brittney Wilcox MD  Sent: 5/13/2021 3:37 PM CDT  Subject: Prescription Question    my blood pressure is still high. ...average readings are around 150 plus over 100 plus. ..does drop to 130s over 90s but also  gets higher. ..this

## 2021-05-17 DIAGNOSIS — J20.9 ACUTE BRONCHITIS, UNSPECIFIED ORGANISM: ICD-10-CM

## 2021-05-17 RX ORDER — MONTELUKAST SODIUM 10 MG/1
10 TABLET ORAL NIGHTLY
Qty: 30 TABLET | Refills: 0 | Status: SHIPPED | OUTPATIENT
Start: 2021-05-17 | End: 2021-05-17

## 2021-05-17 RX ORDER — MONTELUKAST SODIUM 10 MG/1
TABLET ORAL
Qty: 90 TABLET | Refills: 0 | Status: SHIPPED | OUTPATIENT
Start: 2021-05-17 | End: 2021-08-04 | Stop reason: ALTCHOICE

## 2021-05-17 NOTE — TELEPHONE ENCOUNTER
Medication(s) to Refill:   Requested Prescriptions     Pending Prescriptions Disp Refills   • Montelukast Sodium 10 MG Oral Tab 30 tablet 0     Sig: Take 1 tablet (10 mg total) by mouth nightly.          Reason for Medication Refill being sent to Provider /

## 2021-05-25 ENCOUNTER — PATIENT MESSAGE (OUTPATIENT)
Dept: FAMILY MEDICINE CLINIC | Facility: CLINIC | Age: 61
End: 2021-05-25

## 2021-05-26 NOTE — TELEPHONE ENCOUNTER
Please see above message. Patient is requesting a referral to cardiology for his BP. Patient reports that Lisinopril is causing him to cough. Patient would like to see cardiology for alternative medications. Please advise. Thank you!

## 2021-05-26 NOTE — TELEPHONE ENCOUNTER
From: Mike Moncada  To:  Everardo Nix MD  Sent: 5/25/2021 10:06 PM CDT  Subject: Referral Justo Rad Dr. Ally Collins,    Would I be able to get a referral to a Cardiologist?  The Lisinopril is working good for my blood pressure, but the coughing

## 2021-05-28 ENCOUNTER — NURSE ONLY (OUTPATIENT)
Dept: FAMILY MEDICINE CLINIC | Facility: CLINIC | Age: 61
End: 2021-05-28
Payer: COMMERCIAL

## 2021-05-28 DIAGNOSIS — E53.8 B12 DEFICIENCY: Primary | ICD-10-CM

## 2021-05-28 PROCEDURE — 96372 THER/PROPH/DIAG INJ SC/IM: CPT | Performed by: FAMILY MEDICINE

## 2021-05-28 RX ORDER — CYANOCOBALAMIN 1000 UG/ML
1000 INJECTION INTRAMUSCULAR; SUBCUTANEOUS ONCE
Status: COMPLETED | OUTPATIENT
Start: 2021-05-28 | End: 2021-05-28

## 2021-05-28 RX ADMIN — CYANOCOBALAMIN 1000 MCG: 1000 INJECTION INTRAMUSCULAR; SUBCUTANEOUS at 13:05:00

## 2021-06-03 RX ORDER — LOSARTAN POTASSIUM 50 MG/1
TABLET ORAL
Qty: 90 TABLET | Refills: 0 | OUTPATIENT
Start: 2021-06-03

## 2021-06-03 NOTE — TELEPHONE ENCOUNTER
Medication(s) to Refill:   Requested Prescriptions     Pending Prescriptions Disp Refills   • LOSARTAN POTASSIUM 50 MG Oral Tab [Pharmacy Med Name: LOSARTAN 50MG TABLETS] 90 tablet 0     Sig: TAKE 1 TABLET(50 MG) BY MOUTH DAILY         Reason for Medicatio

## 2021-06-04 ENCOUNTER — TELEPHONE (OUTPATIENT)
Dept: FAMILY MEDICINE CLINIC | Facility: CLINIC | Age: 61
End: 2021-06-04

## 2021-06-04 NOTE — TELEPHONE ENCOUNTER
5/5/21  1:06 PM    Vitamin B12   193 - 986 pg/mL 654      Wife is asking if pt should continue his monthly B12 shots.

## 2021-06-09 ENCOUNTER — OFFICE VISIT (OUTPATIENT)
Dept: SURGERY | Facility: CLINIC | Age: 61
End: 2021-06-09
Payer: COMMERCIAL

## 2021-06-09 VITALS — HEART RATE: 97 BPM | SYSTOLIC BLOOD PRESSURE: 118 MMHG | DIASTOLIC BLOOD PRESSURE: 82 MMHG

## 2021-06-09 DIAGNOSIS — K40.20 NON-RECURRENT BILATERAL INGUINAL HERNIA WITHOUT OBSTRUCTION OR GANGRENE: Primary | ICD-10-CM

## 2021-06-09 PROCEDURE — 99244 OFF/OP CNSLTJ NEW/EST MOD 40: CPT | Performed by: SURGERY

## 2021-06-09 PROCEDURE — 3079F DIAST BP 80-89 MM HG: CPT | Performed by: SURGERY

## 2021-06-09 PROCEDURE — 3074F SYST BP LT 130 MM HG: CPT | Performed by: SURGERY

## 2021-06-09 NOTE — H&P
New Patient Visit Note       Active Problems      1. Non-recurrent bilateral inguinal hernia without obstruction or gangrene        Chief Complaint   Patient presents with:  Hernia: NP bilateral inguinal hernia consult- pt denies pain today.  pt states has Number of children: Not on file      Years of education: Not on file      Highest education level: Not on file    Tobacco Use      Smoking status: Former Smoker        Years: 30.00      Smokeless tobacco: Never Used    Substance and Sexual Activity      Al Negative for abdominal distention, abdominal pain, anal bleeding, blood in stool, constipation, diarrhea, nausea and vomiting. Genitourinary: Negative for difficulty urinating, dysuria, frequency and urgency.    Musculoskeletal: Negative for arthralgias a General: Normal range of motion. Cervical back: Normal range of motion and neck supple. Skin:     General: Skin is warm and dry. Neurological:      Mental Status: He is alert and oriented to person, place, and time.    Psychiatric:         Behavior

## 2021-06-09 NOTE — PATIENT INSTRUCTIONS
Having Laparoscopic Groin Hernia Repair: GABRIELA   A groin hernia is a bulge at a weak spot in the lower belly (abdominal) wall. Groin hernias are also called inguinal or femoral hernias. Tissue or organs may press into the weak spot.  This may cause symptom hours. You can likely go home the same day. Before the surgery starts, an IV line is put into a vein in your arm or hand. This line supplies fluids and medicines. · To keep you free of pain during the surgery, you’ll be given anesthesia.  This may be gene educational content on 6/1/2019  © 5541-7411 [employer]. All rights reserved. This information is not intended as a substitute for professional medical care. Always follow your healthcare professional's instructions.

## 2021-06-16 ENCOUNTER — TELEPHONE (OUTPATIENT)
Dept: SURGERY | Facility: CLINIC | Age: 61
End: 2021-06-16

## 2021-06-16 DIAGNOSIS — K40.20 NON-RECURRENT BILATERAL INGUINAL HERNIA WITHOUT OBSTRUCTION OR GANGRENE: Primary | ICD-10-CM

## 2021-07-28 ENCOUNTER — OFFICE VISIT (OUTPATIENT)
Dept: FAMILY MEDICINE CLINIC | Facility: CLINIC | Age: 61
End: 2021-07-28
Payer: COMMERCIAL

## 2021-07-28 VITALS
SYSTOLIC BLOOD PRESSURE: 120 MMHG | HEART RATE: 75 BPM | BODY MASS INDEX: 32.2 KG/M2 | OXYGEN SATURATION: 98 % | DIASTOLIC BLOOD PRESSURE: 88 MMHG | HEIGHT: 71 IN | WEIGHT: 230 LBS | RESPIRATION RATE: 18 BRPM

## 2021-07-28 DIAGNOSIS — R06.83 SNORING: ICD-10-CM

## 2021-07-28 DIAGNOSIS — R06.02 SOB (SHORTNESS OF BREATH): ICD-10-CM

## 2021-07-28 DIAGNOSIS — Z00.00 ROUTINE GENERAL MEDICAL EXAMINATION AT A HEALTH CARE FACILITY: Primary | ICD-10-CM

## 2021-07-28 DIAGNOSIS — I10 ESSENTIAL HYPERTENSION: ICD-10-CM

## 2021-07-28 PROCEDURE — 99396 PREV VISIT EST AGE 40-64: CPT | Performed by: FAMILY MEDICINE

## 2021-07-28 PROCEDURE — 3079F DIAST BP 80-89 MM HG: CPT | Performed by: FAMILY MEDICINE

## 2021-07-28 PROCEDURE — 3074F SYST BP LT 130 MM HG: CPT | Performed by: FAMILY MEDICINE

## 2021-07-28 PROCEDURE — 3008F BODY MASS INDEX DOCD: CPT | Performed by: FAMILY MEDICINE

## 2021-07-28 NOTE — PROGRESS NOTES
Jayne Hansen is a 61year old male who presents for a complete physical exam.   HPI:      Patient presents with:  Shortness Of Breath: on and off for about one week now, pt denies any chest pain or discomfort       Patient is present for complete physi Suspension 2 sprays by Each Nare route daily. 3 Bottle 3   • ergocalciferol 1.25 MG (81172 UT) Oral Cap Take 1 capsule (50,000 Units total) by mouth once a week.  (Patient not taking: Reported on 7/28/2021 ) 4 capsule 3      Past Medical History:   Leana Tian distress. HEENT: PERRLA and EOMI. OP moist no lesions. TM normal, canals normal.  NECK: is supple,thyroid- normal. No carotid bruits. Heart: is RRR. S1, S2, with no murmurs.     Lungs: are clear to auscultation bilaterally, with no wheeze, rhonchi, o

## 2021-07-28 NOTE — PATIENT INSTRUCTIONS
[facility]    Dr. Malcolm Gonzales    [address] [place] [address]  [place], [address] [facility], [address]  [address]:

## 2021-08-04 RX ORDER — ACETAMINOPHEN 500 MG
1000 TABLET ORAL ONCE
Status: CANCELLED | OUTPATIENT
Start: 2021-08-04 | End: 2021-08-04

## 2021-08-05 ENCOUNTER — TELEPHONE (OUTPATIENT)
Dept: FAMILY MEDICINE CLINIC | Facility: CLINIC | Age: 61
End: 2021-08-05

## 2021-08-05 ENCOUNTER — ANESTHESIA EVENT (OUTPATIENT)
Dept: SURGERY | Facility: HOSPITAL | Age: 61
End: 2021-08-05
Payer: COMMERCIAL

## 2021-08-05 NOTE — TELEPHONE ENCOUNTER
Called Dr. Tabatha Salmeron office to ask for requirements/ per [place] [employer] RN no orders needed from office. Stated pre-admission testing takes charges of orders. Spoke to pre-admission testing was informed Pt needs BMP and EKG. Pt completed both test 5/9/2021.  Test mu

## 2021-08-05 NOTE — TELEPHONE ENCOUNTER
pre op 8/9/21, Dr Cathleen Bledsoe, hernia repair at [facility], pink sheet routed

## 2021-08-06 ENCOUNTER — OFFICE VISIT (OUTPATIENT)
Dept: FAMILY MEDICINE CLINIC | Facility: CLINIC | Age: 61
End: 2021-08-06
Payer: COMMERCIAL

## 2021-08-06 ENCOUNTER — LAB ENCOUNTER (OUTPATIENT)
Dept: LAB | Age: 61
End: 2021-08-06
Attending: SURGERY
Payer: COMMERCIAL

## 2021-08-06 VITALS
RESPIRATION RATE: 16 BRPM | DIASTOLIC BLOOD PRESSURE: 80 MMHG | BODY MASS INDEX: 32.06 KG/M2 | HEART RATE: 78 BPM | HEIGHT: 71 IN | WEIGHT: 229 LBS | OXYGEN SATURATION: 97 % | SYSTOLIC BLOOD PRESSURE: 120 MMHG

## 2021-08-06 DIAGNOSIS — K40.20 NON-RECURRENT BILATERAL INGUINAL HERNIA WITHOUT OBSTRUCTION OR GANGRENE: ICD-10-CM

## 2021-08-06 DIAGNOSIS — I10 BENIGN ESSENTIAL HTN: ICD-10-CM

## 2021-08-06 DIAGNOSIS — K40.20 BILATERAL DIRECT INGUINAL HERNIA: ICD-10-CM

## 2021-08-06 DIAGNOSIS — Z01.818 PREOP EXAMINATION: Primary | ICD-10-CM

## 2021-08-06 PROCEDURE — 3079F DIAST BP 80-89 MM HG: CPT | Performed by: FAMILY MEDICINE

## 2021-08-06 PROCEDURE — 3008F BODY MASS INDEX DOCD: CPT | Performed by: FAMILY MEDICINE

## 2021-08-06 PROCEDURE — 3074F SYST BP LT 130 MM HG: CPT | Performed by: FAMILY MEDICINE

## 2021-08-06 PROCEDURE — 99243 OFF/OP CNSLTJ NEW/EST LOW 30: CPT | Performed by: FAMILY MEDICINE

## 2021-08-06 NOTE — PROGRESS NOTES
CC: Preop exam.      HPI:  Saravanan Rosa is a 61year old male who presents for a pre-operative physical exam  By Dr. bolivar. Patient is to have bilateral hernia surgery,secondary to bilateral inguinal hernia to be on 8/9/21.     No prior anaesthesi headaches  PSYCHE: denies depression or anxiety  HEMATOLOGIC: denies hx of anemia  ENDOCRINE: denies thyroid history  ALL/ASTHMA: denies hx of allergy or asthma    EXAM:   /80   Pulse 78   Resp 16   Ht 5' 11\" (1.803 m)   Wt 229 lb (103.9 kg)   SpO2

## 2021-08-06 NOTE — PATIENT INSTRUCTIONS
[facility]    Dr. Clare Meadows    [address]  [facility]  [place], [address]      [address], [address]  [address]

## 2021-08-07 LAB — SARS-COV-2 RNA RESP QL NAA+PROBE: NOT DETECTED

## 2021-08-09 ENCOUNTER — ANESTHESIA (OUTPATIENT)
Dept: SURGERY | Facility: HOSPITAL | Age: 61
End: 2021-08-09
Payer: COMMERCIAL

## 2021-08-09 ENCOUNTER — HOSPITAL ENCOUNTER (OUTPATIENT)
Facility: HOSPITAL | Age: 61
Setting detail: HOSPITAL OUTPATIENT SURGERY
Discharge: HOME | End: 2021-08-09
Attending: SURGERY | Admitting: SURGERY
Payer: COMMERCIAL

## 2021-08-09 VITALS
RESPIRATION RATE: 18 BRPM | SYSTOLIC BLOOD PRESSURE: 124 MMHG | BODY MASS INDEX: 31.95 KG/M2 | HEIGHT: 71 IN | HEART RATE: 73 BPM | WEIGHT: 228.19 LBS | DIASTOLIC BLOOD PRESSURE: 90 MMHG | OXYGEN SATURATION: 98 % | TEMPERATURE: 98 F

## 2021-08-09 DIAGNOSIS — K40.20 NON-RECURRENT BILATERAL INGUINAL HERNIA WITHOUT OBSTRUCTION OR GANGRENE: Primary | ICD-10-CM

## 2021-08-09 PROCEDURE — 76942 ECHO GUIDE FOR BIOPSY: CPT | Performed by: STUDENT IN AN ORGANIZED HEALTH CARE EDUCATION/TRAINING PROGRAM

## 2021-08-09 PROCEDURE — 8E0W4CZ ROBOTIC ASSISTED PROCEDURE OF TRUNK REGION, PERCUTANEOUS ENDOSCOPIC APPROACH: ICD-10-PCS | Performed by: SURGERY

## 2021-08-09 PROCEDURE — 0YUA4JZ SUPPLEMENT BILATERAL INGUINAL REGION WITH SYNTHETIC SUBSTITUTE, PERCUTANEOUS ENDOSCOPIC APPROACH: ICD-10-PCS | Performed by: SURGERY

## 2021-08-09 PROCEDURE — 49650 LAP ING HERNIA REPAIR INIT: CPT | Performed by: PHYSICIAN ASSISTANT

## 2021-08-09 PROCEDURE — 49650 LAP ING HERNIA REPAIR INIT: CPT | Performed by: SURGERY

## 2021-08-09 DEVICE — LAPAROSCOPIC SELF-FIXATING MESH POLYESTER WITH POLYLACTIC ACID GRIPS AND COLLAGEN FILM
Type: IMPLANTABLE DEVICE | Site: ABDOMEN | Status: FUNCTIONAL
Brand: PROGRIP

## 2021-08-09 RX ORDER — MEPERIDINE HYDROCHLORIDE 25 MG/ML
12.5 INJECTION INTRAMUSCULAR; INTRAVENOUS; SUBCUTANEOUS AS NEEDED
Status: DISCONTINUED | OUTPATIENT
Start: 2021-08-09 | End: 2021-08-09

## 2021-08-09 RX ORDER — ACETAMINOPHEN 500 MG
1000 TABLET ORAL ONCE
COMMUNITY

## 2021-08-09 RX ORDER — METOCLOPRAMIDE HYDROCHLORIDE 5 MG/ML
INJECTION INTRAMUSCULAR; INTRAVENOUS AS NEEDED
Status: DISCONTINUED | OUTPATIENT
Start: 2021-08-09 | End: 2021-08-09 | Stop reason: SURG

## 2021-08-09 RX ORDER — SODIUM CHLORIDE, SODIUM LACTATE, POTASSIUM CHLORIDE, CALCIUM CHLORIDE 600; 310; 30; 20 MG/100ML; MG/100ML; MG/100ML; MG/100ML
INJECTION, SOLUTION INTRAVENOUS CONTINUOUS
Status: DISCONTINUED | OUTPATIENT
Start: 2021-08-09 | End: 2021-08-09

## 2021-08-09 RX ORDER — NALOXONE HYDROCHLORIDE 0.4 MG/ML
80 INJECTION, SOLUTION INTRAMUSCULAR; INTRAVENOUS; SUBCUTANEOUS AS NEEDED
Status: DISCONTINUED | OUTPATIENT
Start: 2021-08-09 | End: 2021-08-09

## 2021-08-09 RX ORDER — LIDOCAINE HYDROCHLORIDE 10 MG/ML
INJECTION, SOLUTION EPIDURAL; INFILTRATION; INTRACAUDAL; PERINEURAL AS NEEDED
Status: DISCONTINUED | OUTPATIENT
Start: 2021-08-09 | End: 2021-08-09 | Stop reason: SURG

## 2021-08-09 RX ORDER — ONDANSETRON 2 MG/ML
4 INJECTION INTRAMUSCULAR; INTRAVENOUS AS NEEDED
Status: DISCONTINUED | OUTPATIENT
Start: 2021-08-09 | End: 2021-08-09

## 2021-08-09 RX ORDER — KETOROLAC TROMETHAMINE 30 MG/ML
INJECTION, SOLUTION INTRAMUSCULAR; INTRAVENOUS AS NEEDED
Status: DISCONTINUED | OUTPATIENT
Start: 2021-08-09 | End: 2021-08-09 | Stop reason: SURG

## 2021-08-09 RX ORDER — LABETALOL HYDROCHLORIDE 5 MG/ML
INJECTION, SOLUTION INTRAVENOUS AS NEEDED
Status: DISCONTINUED | OUTPATIENT
Start: 2021-08-09 | End: 2021-08-09 | Stop reason: SURG

## 2021-08-09 RX ORDER — ROCURONIUM BROMIDE 10 MG/ML
INJECTION, SOLUTION INTRAVENOUS AS NEEDED
Status: DISCONTINUED | OUTPATIENT
Start: 2021-08-09 | End: 2021-08-09 | Stop reason: SURG

## 2021-08-09 RX ORDER — DOCUSATE SODIUM 100 MG/1
100 CAPSULE, LIQUID FILLED ORAL 3 TIMES DAILY
Qty: 90 CAPSULE | Refills: 0 | Status: SHIPPED | OUTPATIENT
Start: 2021-08-09 | End: 2021-08-24 | Stop reason: ALTCHOICE

## 2021-08-09 RX ORDER — DEXAMETHASONE SODIUM PHOSPHATE 4 MG/ML
VIAL (ML) INJECTION AS NEEDED
Status: DISCONTINUED | OUTPATIENT
Start: 2021-08-09 | End: 2021-08-09 | Stop reason: SURG

## 2021-08-09 RX ORDER — HYDROMORPHONE HYDROCHLORIDE 1 MG/ML
INJECTION, SOLUTION INTRAMUSCULAR; INTRAVENOUS; SUBCUTANEOUS
Status: COMPLETED
Start: 2021-08-09 | End: 2021-08-09

## 2021-08-09 RX ORDER — PHENYLEPHRINE HCL 10 MG/ML
VIAL (ML) INJECTION AS NEEDED
Status: DISCONTINUED | OUTPATIENT
Start: 2021-08-09 | End: 2021-08-09 | Stop reason: SURG

## 2021-08-09 RX ORDER — MIDAZOLAM HYDROCHLORIDE 1 MG/ML
INJECTION INTRAMUSCULAR; INTRAVENOUS AS NEEDED
Status: DISCONTINUED | OUTPATIENT
Start: 2021-08-09 | End: 2021-08-09 | Stop reason: SURG

## 2021-08-09 RX ORDER — HYDROCODONE BITARTRATE AND ACETAMINOPHEN 5; 325 MG/1; MG/1
2 TABLET ORAL AS NEEDED
Status: COMPLETED | OUTPATIENT
Start: 2021-08-09 | End: 2021-08-09

## 2021-08-09 RX ORDER — ONDANSETRON 2 MG/ML
INJECTION INTRAMUSCULAR; INTRAVENOUS AS NEEDED
Status: DISCONTINUED | OUTPATIENT
Start: 2021-08-09 | End: 2021-08-09 | Stop reason: SURG

## 2021-08-09 RX ORDER — HYDROCODONE BITARTRATE AND ACETAMINOPHEN 5; 325 MG/1; MG/1
1 TABLET ORAL AS NEEDED
Status: COMPLETED | OUTPATIENT
Start: 2021-08-09 | End: 2021-08-09

## 2021-08-09 RX ORDER — ALBUTEROL SULFATE 2.5 MG/3ML
2.5 SOLUTION RESPIRATORY (INHALATION) AS NEEDED
Status: DISCONTINUED | OUTPATIENT
Start: 2021-08-09 | End: 2021-08-09

## 2021-08-09 RX ORDER — MIDAZOLAM HYDROCHLORIDE 1 MG/ML
1 INJECTION INTRAMUSCULAR; INTRAVENOUS EVERY 5 MIN PRN
Status: DISCONTINUED | OUTPATIENT
Start: 2021-08-09 | End: 2021-08-09

## 2021-08-09 RX ORDER — CEFAZOLIN SODIUM/WATER 2 G/20 ML
2 SYRINGE (ML) INTRAVENOUS ONCE
Status: COMPLETED | OUTPATIENT
Start: 2021-08-09 | End: 2021-08-09

## 2021-08-09 RX ORDER — LABETALOL HYDROCHLORIDE 5 MG/ML
5 INJECTION, SOLUTION INTRAVENOUS EVERY 5 MIN PRN
Status: DISCONTINUED | OUTPATIENT
Start: 2021-08-09 | End: 2021-08-09

## 2021-08-09 RX ORDER — HYDROCODONE BITARTRATE AND ACETAMINOPHEN 5; 325 MG/1; MG/1
TABLET ORAL
Qty: 10 TABLET | Refills: 0 | Status: SHIPPED | OUTPATIENT
Start: 2021-08-09 | End: 2021-08-24 | Stop reason: ALTCHOICE

## 2021-08-09 RX ORDER — HYDROMORPHONE HYDROCHLORIDE 1 MG/ML
0.4 INJECTION, SOLUTION INTRAMUSCULAR; INTRAVENOUS; SUBCUTANEOUS EVERY 5 MIN PRN
Status: DISCONTINUED | OUTPATIENT
Start: 2021-08-09 | End: 2021-08-09

## 2021-08-09 RX ORDER — HEPARIN SODIUM 5000 [USP'U]/ML
5000 INJECTION, SOLUTION INTRAVENOUS; SUBCUTANEOUS ONCE
Status: COMPLETED | OUTPATIENT
Start: 2021-08-09 | End: 2021-08-09

## 2021-08-09 RX ADMIN — METOCLOPRAMIDE HYDROCHLORIDE 10 MG: 5 INJECTION INTRAMUSCULAR; INTRAVENOUS at 11:57:00

## 2021-08-09 RX ADMIN — LIDOCAINE HYDROCHLORIDE 50 MG: 10 INJECTION, SOLUTION EPIDURAL; INFILTRATION; INTRACAUDAL; PERINEURAL at 11:57:00

## 2021-08-09 RX ADMIN — ROCURONIUM BROMIDE 30 MG: 10 INJECTION, SOLUTION INTRAVENOUS at 12:10:00

## 2021-08-09 RX ADMIN — PHENYLEPHRINE HCL 100 MCG: 10 MG/ML VIAL (ML) INJECTION at 11:57:00

## 2021-08-09 RX ADMIN — ONDANSETRON 4 MG: 2 INJECTION INTRAMUSCULAR; INTRAVENOUS at 13:10:00

## 2021-08-09 RX ADMIN — LABETALOL HYDROCHLORIDE 5 MG: 5 INJECTION, SOLUTION INTRAVENOUS at 12:30:00

## 2021-08-09 RX ADMIN — ROCURONIUM BROMIDE 10 MG: 10 INJECTION, SOLUTION INTRAVENOUS at 11:57:00

## 2021-08-09 RX ADMIN — SODIUM CHLORIDE, SODIUM LACTATE, POTASSIUM CHLORIDE, CALCIUM CHLORIDE: 600; 310; 30; 20 INJECTION, SOLUTION INTRAVENOUS at 11:52:00

## 2021-08-09 RX ADMIN — MIDAZOLAM HYDROCHLORIDE 2 MG: 1 INJECTION INTRAMUSCULAR; INTRAVENOUS at 11:52:00

## 2021-08-09 RX ADMIN — DEXAMETHASONE SODIUM PHOSPHATE 4 MG: 4 MG/ML VIAL (ML) INJECTION at 11:57:00

## 2021-08-09 RX ADMIN — CEFAZOLIN SODIUM/WATER 2 G: 2 G/20 ML SYRINGE (ML) INTRAVENOUS at 12:10:00

## 2021-08-09 RX ADMIN — ROCURONIUM BROMIDE 10 MG: 10 INJECTION, SOLUTION INTRAVENOUS at 12:25:00

## 2021-08-09 RX ADMIN — ROCURONIUM BROMIDE 20 MG: 10 INJECTION, SOLUTION INTRAVENOUS at 12:55:00

## 2021-08-09 RX ADMIN — KETOROLAC TROMETHAMINE 30 MG: 30 INJECTION, SOLUTION INTRAMUSCULAR; INTRAVENOUS at 13:10:00

## 2021-08-09 NOTE — ANESTHESIA POSTPROCEDURE EVALUATION
[address] [facility]  [address] Patient Status:  Hospital Outpatient Surgery   Age/Gender 61year old male MRN [id number]   Location [facility] Attending Russ Malcolm MD   Hosp Day # 0 PCP Hussein Ga MD       A

## 2021-08-09 NOTE — ANESTHESIA PROCEDURE NOTES
Airway  Date/Time: 8/9/2021 11:59 AM  Urgency: elective      General Information and Staff    Patient location during procedure: OR  Anesthesiologist: Sofiya Arredondo MD  Performed: anesthesiologist     Indications and Patient Condition  Indications for airwa

## 2021-08-09 NOTE — OPERATIVE REPORT
OPERATIVE REPORT    PREOPERATIVE DIAGNOSIS:  Bilateral inguinal hernia. POSTOPERATIVE DIAGNOSIS:  Bilateral incarcerated inguinal hernia.   PROCEDURE PERFORMED: Robotic bilateral incarcerated inguinal hernia repair with mesh ( ProGrip mesh used)      NIKKO surgery. PROCEDURE: The patient was brought to the operating room, and after induction of general endotracheal anesthesia, the anesthesiologist performed an TAP block. Next, the abdomen was prepped and draped in usual sterile fashion.   The patient was correct. Pneumoperitoneum was released and all instruments had been removed under direct vision as well. The skin incisions are closed with 4-0 Monocryl suture. Dermabond was used to seal the incisions. The anesthesiologist performed a TAP block.  The canelo

## 2021-08-09 NOTE — BRIEF OP NOTE
Pre-Operative Diagnosis: Non-recurrent bilateral inguinal hernia without obstruction or gangrene [K40.20]     Post-Operative Diagnosis: Non-recurrent bilateral inguinal hernia without obstruction or gangrene [K40.20]      Procedure Performed:   ROBOTIC LAP

## 2021-08-09 NOTE — ANESTHESIA PROCEDURE NOTES
Regional Block  Performed by: Delores Molina MD  Authorized by: Delores Molina MD       General Information and Staff    Start Time:  8/9/2021 12:00 PM  End Time:  8/9/2021 12:03 PM  Anesthesiologist:  Delores Molina MD  Performed by:   Anesthesiologist  Patient

## 2021-08-09 NOTE — ANESTHESIA PREPROCEDURE EVALUATION
PRE-OP EVALUATION    Patient Name: Raudel Red    Admit Diagnosis: Non-recurrent bilateral inguinal hernia without obstruction or gangrene [K40.20]    Pre-op Diagnosis: Non-recurrent bilateral inguinal hernia without obstruction or gangrene [K40.20] regular  Rate: normal     Dental    No notable dental history. Pulmonary    Pulmonary exam normal.  Breath sounds clear to auscultation bilaterally.                Other findings            ASA: 2   Plan: general  NPO status verified and patient antonio

## 2021-08-09 NOTE — H&P
New Patient Visit Note     Active Problems      1. Non-recurrent bilateral inguinal hernia without obstruction or gangrene          Chief Complaint   Patient presents with:  Hernia: NP bilateral inguinal hernia consult- pt denies pain today.  pt states has Spouse name: Not on file      Number of children: Not on file      Years of education: Not on file      Highest education level: Not on file    Tobacco Use      Smoking status: Former Smoker        Years: 30.00      Smokeless tobacco: Never Used    DELICIA Hoyos swelling. Gastrointestinal: Negative for abdominal distention, abdominal pain, anal bleeding, blood in stool, constipation, diarrhea, nausea and vomiting. Genitourinary: Negative for difficulty urinating, dysuria, frequency and urgency.    Musculoskelet Musculoskeletal:         General: Normal range of motion. Cervical back: Normal range of motion and neck supple. Skin:     General: Skin is warm and dry. Neurological:      Mental Status: He is alert and oriented to person, place, and time.    Psyc this procedure. We will proceed with procedure as planned.

## 2021-08-10 ENCOUNTER — ORDER TRANSCRIPTION (OUTPATIENT)
Dept: ADMINISTRATIVE | Facility: HOSPITAL | Age: 61
End: 2021-08-10

## 2021-08-10 DIAGNOSIS — Z01.818 PREPROCEDURAL EXAMINATION: Primary | ICD-10-CM

## 2021-08-24 ENCOUNTER — OFFICE VISIT (OUTPATIENT)
Dept: SURGERY | Facility: CLINIC | Age: 61
End: 2021-08-24

## 2021-08-24 VITALS — SYSTOLIC BLOOD PRESSURE: 121 MMHG | DIASTOLIC BLOOD PRESSURE: 91 MMHG | HEART RATE: 79 BPM | TEMPERATURE: 98 F

## 2021-08-24 DIAGNOSIS — Z98.890 HISTORY OF BILATERAL INGUINAL HERNIA REPAIR: Primary | ICD-10-CM

## 2021-08-24 DIAGNOSIS — Z87.19 HISTORY OF BILATERAL INGUINAL HERNIA REPAIR: Primary | ICD-10-CM

## 2021-08-24 PROBLEM — K40.20 NON-RECURRENT BILATERAL INGUINAL HERNIA WITHOUT OBSTRUCTION OR GANGRENE: Status: RESOLVED | Noted: 2021-06-09 | Resolved: 2021-08-24

## 2021-08-24 PROCEDURE — 99024 POSTOP FOLLOW-UP VISIT: CPT | Performed by: PHYSICIAN ASSISTANT

## 2021-08-24 PROCEDURE — 3080F DIAST BP >= 90 MM HG: CPT | Performed by: PHYSICIAN ASSISTANT

## 2021-08-24 PROCEDURE — 3074F SYST BP LT 130 MM HG: CPT | Performed by: PHYSICIAN ASSISTANT

## 2021-08-24 NOTE — PROGRESS NOTES
Post Operative Visit Note       Active Problems  1.  History of bilateral inguinal hernia repair         Chief Complaint   Patient presents with:  Hernia: 8/9 BIH repair, denies pain         History of Present Illness   The patient presents today for postop Caffeine Concern: No        Exercise: No        Seat Belt: Yes       Current Outpatient Medications:   •  acetaminophen 500 MG Oral Tab, Take 1,000 mg by mouth once., Disp: , Rfl:   •  lisinopril 20 MG Oral Tab, Take 1 tablet (20 mg total) by mouth daily. Tenderness: There is no abdominal tenderness. There is no rebound. Comments: Abdomen is soft, nondistended, nontender to palpation. Incisions have healed well. Dermabond is in place. There is no redness or drainage noted.    Skin:     General:

## 2021-08-26 ENCOUNTER — TELEPHONE (OUTPATIENT)
Dept: FAMILY MEDICINE CLINIC | Facility: CLINIC | Age: 61
End: 2021-08-26

## 2021-08-26 NOTE — TELEPHONE ENCOUNTER
Fax received from Dr. Emmanuel Crabtree that Sleep Study was denied & pt to see ENT, Dr. Tyson Mendieta. Message sent to pt via [employer] with this info.

## 2021-09-01 RX ORDER — LISINOPRIL 20 MG/1
TABLET ORAL
Qty: 90 TABLET | Refills: 0 | Status: SHIPPED | OUTPATIENT
Start: 2021-09-01

## 2021-09-01 NOTE — TELEPHONE ENCOUNTER
Please confirm dosing and directions of Lisinopril 20mg every day, med list states patient is taking med differently: 10mg QD

## 2021-09-04 ENCOUNTER — LAB ENCOUNTER (OUTPATIENT)
Dept: LAB | Age: 61
End: 2021-09-04
Attending: FAMILY MEDICINE
Payer: COMMERCIAL

## 2021-09-04 ENCOUNTER — LAB ENCOUNTER (OUTPATIENT)
Dept: LAB | Facility: HOSPITAL | Age: 61
End: 2021-09-04
Attending: FAMILY MEDICINE
Payer: COMMERCIAL

## 2021-09-04 DIAGNOSIS — Z01.818 PREPROCEDURAL EXAMINATION: ICD-10-CM

## 2021-09-05 LAB — SARS-COV-2 RNA RESP QL NAA+PROBE: NOT DETECTED

## 2021-09-07 NOTE — PROGRESS NOTES
Hi Raffaele    Your covid testing shows no immunity. Dr. Altagracia Jones said ok to get the J&J vaccine.   Take care,  Lorena Grossman

## 2021-09-09 ENCOUNTER — TELEPHONE (OUTPATIENT)
Dept: FAMILY MEDICINE CLINIC | Facility: CLINIC | Age: 61
End: 2021-09-09

## 2021-09-09 DIAGNOSIS — Z01.84 IMMUNITY STATUS TESTING: Primary | ICD-10-CM

## 2021-09-09 NOTE — TELEPHONE ENCOUNTER
Message left on pt's VM to call office back using [place] language line. Per Dr. Guerrero Clause request, call out to pt to check COVID status/illnesses in the home.   Pt has Negative COVID test on 9/4

## 2021-09-10 ENCOUNTER — TELEPHONE (OUTPATIENT)
Dept: SURGERY | Facility: CLINIC | Age: 61
End: 2021-09-10

## 2021-09-10 NOTE — TELEPHONE ENCOUNTER
The patient left a message with our answer for him is that he was having increased pain in the left lower abdomen. I spoke with the patient and he reported that he had bilateral inguinal hernia repair on 8/9/2021 by Dr. Anny Aquino.   He states that he was do

## 2021-09-16 ENCOUNTER — LAB ENCOUNTER (OUTPATIENT)
Dept: LAB | Age: 61
End: 2021-09-16
Attending: FAMILY MEDICINE
Payer: COMMERCIAL

## 2021-09-16 DIAGNOSIS — Z01.84 IMMUNITY STATUS TESTING: ICD-10-CM

## 2021-09-16 DIAGNOSIS — N40.0 ENLARGED PROSTATE: ICD-10-CM

## 2021-09-16 LAB
COMPLEXED PSA SERPL-MCNC: 0.82 NG/ML (ref ?–4)
SARS-COV-2 IGG+IGM SERPL QL IA: NONREACTIVE

## 2021-09-16 PROCEDURE — 86769 SARS-COV-2 COVID-19 ANTIBODY: CPT | Performed by: FAMILY MEDICINE

## 2021-09-16 PROCEDURE — 84153 ASSAY OF PSA TOTAL: CPT | Performed by: FAMILY MEDICINE

## 2021-09-17 ENCOUNTER — TELEPHONE (OUTPATIENT)
Dept: FAMILY MEDICINE CLINIC | Facility: CLINIC | Age: 61
End: 2021-09-17

## 2021-09-17 NOTE — TELEPHONE ENCOUNTER
Notified the patient/patient's wife of the below lab result. (Patient in the drive thru ordering food so put phone on speak to have wife talk.) Patient's wife verbalized understanding. Answered all questions at this time.

## 2021-09-28 ENCOUNTER — TELEPHONE (OUTPATIENT)
Dept: FAMILY MEDICINE CLINIC | Facility: CLINIC | Age: 61
End: 2021-09-28

## 2021-09-28 ENCOUNTER — LAB ENCOUNTER (OUTPATIENT)
Dept: LAB | Age: 61
End: 2021-09-28
Attending: FAMILY MEDICINE
Payer: COMMERCIAL

## 2021-09-28 DIAGNOSIS — Z01.84 IMMUNITY STATUS TESTING: ICD-10-CM

## 2021-09-28 DIAGNOSIS — Z01.84 IMMUNITY STATUS TESTING: Primary | ICD-10-CM

## 2021-09-28 NOTE — TELEPHONE ENCOUNTER
Pt is scheduled for a stress test and needs a covid test prior to the procedure.   Order placed as pt was at the lab now and no orders were found

## 2021-09-29 ENCOUNTER — TELEPHONE (OUTPATIENT)
Dept: FAMILY MEDICINE CLINIC | Facility: CLINIC | Age: 61
End: 2021-09-29

## 2021-10-01 ENCOUNTER — HOSPITAL ENCOUNTER (OUTPATIENT)
Dept: CV DIAGNOSTICS | Age: 61
Discharge: HOME | End: 2021-10-01
Attending: FAMILY MEDICINE
Payer: COMMERCIAL

## 2021-10-01 DIAGNOSIS — R06.02 SOB (SHORTNESS OF BREATH): ICD-10-CM

## 2021-10-01 DIAGNOSIS — I10 ESSENTIAL HYPERTENSION: ICD-10-CM

## 2021-10-01 DIAGNOSIS — R06.83 SNORING: ICD-10-CM

## 2021-10-01 PROCEDURE — 93017 CV STRESS TEST TRACING ONLY: CPT | Performed by: FAMILY MEDICINE

## 2021-10-01 PROCEDURE — 93350 STRESS TTE ONLY: CPT | Performed by: FAMILY MEDICINE

## 2021-10-01 PROCEDURE — 93018 CV STRESS TEST I&R ONLY: CPT | Performed by: FAMILY MEDICINE

## 2021-10-05 ENCOUNTER — TELEPHONE (OUTPATIENT)
Dept: FAMILY MEDICINE CLINIC | Facility: CLINIC | Age: 61
End: 2021-10-05

## 2021-10-31 DIAGNOSIS — J20.9 ACUTE BRONCHITIS, UNSPECIFIED ORGANISM: ICD-10-CM

## 2021-11-01 RX ORDER — MONTELUKAST SODIUM 10 MG/1
TABLET ORAL
Qty: 90 TABLET | Refills: 0 | OUTPATIENT
Start: 2021-11-01

## 2021-11-09 ENCOUNTER — OFFICE VISIT (OUTPATIENT)
Dept: FAMILY MEDICINE CLINIC | Facility: CLINIC | Age: 61
End: 2021-11-09
Payer: COMMERCIAL

## 2021-11-09 VITALS
RESPIRATION RATE: 16 BRPM | TEMPERATURE: 98 F | HEART RATE: 104 BPM | HEIGHT: 72 IN | SYSTOLIC BLOOD PRESSURE: 122 MMHG | BODY MASS INDEX: 30.34 KG/M2 | OXYGEN SATURATION: 97 % | DIASTOLIC BLOOD PRESSURE: 78 MMHG | WEIGHT: 224 LBS

## 2021-11-09 DIAGNOSIS — R11.0 NAUSEA: ICD-10-CM

## 2021-11-09 DIAGNOSIS — Z20.822 ENCOUNTER FOR SCREENING LABORATORY TESTING FOR COVID-19 VIRUS: ICD-10-CM

## 2021-11-09 DIAGNOSIS — R68.83 CHILLS: ICD-10-CM

## 2021-11-09 DIAGNOSIS — R19.7 DIARRHEA IN ADULT PATIENT: Primary | ICD-10-CM

## 2021-11-09 DIAGNOSIS — R52 BODY ACHES: ICD-10-CM

## 2021-11-09 PROCEDURE — 99213 OFFICE O/P EST LOW 20 MIN: CPT | Performed by: NURSE PRACTITIONER

## 2021-11-09 PROCEDURE — 3078F DIAST BP <80 MM HG: CPT | Performed by: NURSE PRACTITIONER

## 2021-11-09 PROCEDURE — 3074F SYST BP LT 130 MM HG: CPT | Performed by: NURSE PRACTITIONER

## 2021-11-09 PROCEDURE — 3008F BODY MASS INDEX DOCD: CPT | Performed by: NURSE PRACTITIONER

## 2021-11-09 RX ORDER — ONDANSETRON 4 MG/1
4 TABLET, FILM COATED ORAL EVERY 8 HOURS PRN
Qty: 30 TABLET | Refills: 0 | Status: SHIPPED | OUTPATIENT
Start: 2021-11-09

## 2021-11-10 NOTE — PATIENT INSTRUCTIONS
Diarrhea with Uncertain Cause (Adult)    Diarrhea is when stools are loose and watery.  This can be caused by:  · Viral infections  · Bacterial infections  · Food poisoning  · Parasites  · Irritable bowel syndrome (IBS)  · Inflammatory bowel diseases such disease, or ever had a stomach ulcer or gastrointestinal bleeding. Don't use NSAID medicines if you are already taking one for another condition (like arthritis) or are on daily aspirin therapy (such as for heart disease or after a stroke).  Talk with your or drink milk if you have diarrhea. These can make diarrhea worse. During the first 24 hours you can try:  · Oral rehydration solutions.  Sports drinks may be used if you are not too dehydrated and are otherwise healthy.   · Soft drinks without caffeine  · weakness, or dizziness  · Drowsiness  · New rash  · You don’t get better in 2 to 3 days  · Fever of 100.4°F (38°C) or higher, or as directed by your healthcare provider  Gris last reviewed this educational content on 6/1/2018  © 1569-7613 The [employer] protect those at high-risk for serious illness, and lowers the strain on hospitals during the COVID-19 pandemic. Take precautions: Travel and other outings  Stay informed about COVID-19 in your area. Follow local instructions about being in public.  Be sarah any soiled surface. Closely follow disinfectant label instructions. You can find them at the [facility] detailed cleaning website. · Check your home supplies. Consider keeping a 2-week supply of medicines, food, and other needed household items.   · Make a plan requirements. Generally, the CDC advises people ages 3 and older who are not vaccinated to wear masks in public places and when around people who don't live in their household.  CDC's guidance for when to wear a mask is a bit different for fully vaccinated by your employer, CDC guidance, and your community's instructions. · When possible, don't touch \"high-touch\" public surfaces such as doorknobs and handles, cabinet handles, and light switches.  If you touch these surfaces, clean them first with a disinfe result.     · Take your temperature every morning and evening for 14 days after exposure. This is to check for fever. Keep a record of the readings.  If possible, stay away from others, especially those who are at higher risk of getting very sick from [facility]

## 2021-11-10 NOTE — PROGRESS NOTES
CHIEF COMPLAINT:   Patient presents with:  Covid-19 Test: 3 days. +neck ache, nausea, diarrhea, hot sweats, 99.6 last night. No sick contact. No hx of covid. No vaccine. No travel. Taking excedrin.       HPI:   Kat Avalos is a 64year old male who pre pain or palpitations   GI: denies abdominal pain      EXAM:   /78 (BP Location: Right arm)   Pulse 104   Temp 98 °F (36.7 °C) (Skin)   Resp 16   Ht 6' (1.829 m)   Wt 224 lb (101.6 kg)   SpO2 97%   BMI 30.38 kg/m²   GENERAL: well developed, well deyanira infections  · Bacterial infections  · Food poisoning  · Parasites  · Irritable bowel syndrome (IBS)  · Inflammatory bowel diseases such as ulcerative colitis, Crohn's disease, and celiac disease  · Food intolerance, such as to lactose, the sugar found in m for another condition (like arthritis) or are on daily aspirin therapy (such as for heart disease or after a stroke). Talk with your healthcare provider first.  · If antibiotics were prescribed, be sure you take them until they are finished.  Don’t stop natasha rehydration solutions.  Sports drinks may be used if you are not too dehydrated and are otherwise healthy.   · Soft drinks without caffeine  · Ginger ale  · Water (plain or flavored)  · Decaf tea or coffee  · Clear broth, consommé, or bouillon  · Gelatin, p or higher, or as directed by your healthcare provider  Gris last reviewed this educational content on 6/1/2018  © 8003-3811 The [employer]. All rights reserved. This information is not intended as a substitute for professional medical care. precautions: Travel and other outings  Stay informed about COVID-19 in your area. Follow local instructions about being in public. Be aware of events in your community that may be postponed or canceled, such as school and sporting events.  You may be advise website. · Check your home supplies. Consider keeping a 2-week supply of medicines, food, and other needed household items. · Make a plan for childcare, work, and ways to stay in touch with others. Know who will help you if you get sick.   · Don't be arou and when around people who don't live in their household. CDC's guidance for when to wear a mask is a bit different for fully vaccinated people. Fully vaccinated means 2 weeks after getting either the 1-dose or the second shot of the 2-dose vaccine.  People surfaces such as doorknobs and handles, cabinet handles, and light switches. If you touch these surfaces, clean them first with a disinfecting wipe. Or touch them using a tissue or paper towel. · Use office jenna one person at a time.   · Consider not h record of the readings. If possible, stay away from others, especially those who are at higher risk of getting very sick from COVID-19. · Watch for symptoms of the virus such as cough or trouble breathing.  If you develop any symptoms, isolate yourself rig

## 2021-11-11 ENCOUNTER — TELEPHONE (OUTPATIENT)
Dept: FAMILY MEDICINE CLINIC | Facility: CLINIC | Age: 61
End: 2021-11-11

## 2021-11-11 ENCOUNTER — TELEPHONE (OUTPATIENT)
Dept: CASE MANAGEMENT | Age: 61
End: 2021-11-11

## 2021-11-11 DIAGNOSIS — U07.1 COVID-19: Primary | ICD-10-CM

## 2021-11-11 NOTE — TELEPHONE ENCOUNTER
Covid-19 test positive. Spoke with pt. He notes still having symptoms but not feeling worse. Symptoms started Sunday 11/7. Denies any chest pain, shortness of breath or wheezing.  We reviewed quarantine guidelines.      Pt is interested in MAB therapy.

## 2021-11-12 ENCOUNTER — TELEPHONE (OUTPATIENT)
Dept: FAMILY MEDICINE CLINIC | Facility: CLINIC | Age: 61
End: 2021-11-12

## 2021-11-12 RX ORDER — OLMESARTAN MEDOXOMIL 40 MG/1
40 TABLET ORAL DAILY
Qty: 30 TABLET | Refills: 2 | Status: SHIPPED | OUTPATIENT
Start: 2021-11-12 | End: 2021-11-21

## 2021-11-13 ENCOUNTER — HOSPITAL ENCOUNTER (EMERGENCY)
Age: 61
Discharge: HOME | End: 2021-11-13
Attending: EMERGENCY MEDICINE
Payer: COMMERCIAL

## 2021-11-13 VITALS
HEIGHT: 71 IN | TEMPERATURE: 101 F | OXYGEN SATURATION: 96 % | DIASTOLIC BLOOD PRESSURE: 65 MMHG | BODY MASS INDEX: 31.36 KG/M2 | RESPIRATION RATE: 18 BRPM | HEART RATE: 85 BPM | SYSTOLIC BLOOD PRESSURE: 105 MMHG | WEIGHT: 224 LBS

## 2021-11-13 DIAGNOSIS — R11.0 NAUSEA: ICD-10-CM

## 2021-11-13 DIAGNOSIS — U07.1 COVID-19: Primary | ICD-10-CM

## 2021-11-13 DIAGNOSIS — E86.0 DEHYDRATION: ICD-10-CM

## 2021-11-13 PROCEDURE — 80053 COMPREHEN METABOLIC PANEL: CPT | Performed by: EMERGENCY MEDICINE

## 2021-11-13 PROCEDURE — 99284 EMERGENCY DEPT VISIT MOD MDM: CPT | Performed by: EMERGENCY MEDICINE

## 2021-11-13 PROCEDURE — 96374 THER/PROPH/DIAG INJ IV PUSH: CPT | Performed by: EMERGENCY MEDICINE

## 2021-11-13 PROCEDURE — 96361 HYDRATE IV INFUSION ADD-ON: CPT | Performed by: EMERGENCY MEDICINE

## 2021-11-13 PROCEDURE — 85025 COMPLETE CBC W/AUTO DIFF WBC: CPT | Performed by: EMERGENCY MEDICINE

## 2021-11-13 RX ORDER — ONDANSETRON 4 MG/1
4 TABLET, ORALLY DISINTEGRATING ORAL EVERY 4 HOURS PRN
Qty: 10 TABLET | Refills: 0 | Status: SHIPPED | OUTPATIENT
Start: 2021-11-13 | End: 2021-11-20

## 2021-11-13 RX ORDER — ACETAMINOPHEN 500 MG
1000 TABLET ORAL ONCE
Status: COMPLETED | OUTPATIENT
Start: 2021-11-13 | End: 2021-11-13

## 2021-11-13 RX ORDER — ONDANSETRON 2 MG/ML
4 INJECTION INTRAMUSCULAR; INTRAVENOUS ONCE
Status: COMPLETED | OUTPATIENT
Start: 2021-11-13 | End: 2021-11-13

## 2021-11-14 NOTE — ED INITIAL ASSESSMENT (HPI)
Pt, who tested positive for COVID on 11-9, presents to ER with worsening cough, shortness of breath, fever, and body aches.

## 2021-11-14 NOTE — ED PROVIDER NOTES
Patient Seen in: THE [facility] Emergency Department In [place]      History   Patient presents with:  Covid  Difficulty Breathing    Stated Complaint: covid + 11-9, fever, headaches, nausea    Subjective:   HPI    57-year-old male presents to the emergency d Abdomen is nontender. Extremities are atraumatic. Skin is dry without rashes or lesions. Neuro exam: Awake, conversive and moving all 4 extremities well.     ED Course     Labs Reviewed   COMP METABOLIC PANEL (14) - Abnormal; Notable for the following co Regen-COV, and informed that casirivimab and imdevimab are unapproved drugs that are authorized for use under this Emergency Use Authorization. The patient has agreed and will receive the infusion and be monitored for 60 minutes after.    They have been adv

## 2021-11-14 NOTE — ED QUICK NOTES
Patient was provided with home pulse oximeter and incentive spirometer and instructed on use. Patient verbalized understanding.

## 2021-11-15 ENCOUNTER — TELEPHONE (OUTPATIENT)
Dept: CASE MANAGEMENT | Age: 61
End: 2021-11-15

## 2021-11-15 NOTE — TELEPHONE ENCOUNTER
Pt received PAB infusion at [facility] on 11/13/21 for COVID-19. Please follow-up with pt for post-infusion assessment and home monitoring if needed. Thank you.

## 2021-11-17 NOTE — TELEPHONE ENCOUNTER
Called patient using language line.  [employer]. ID# [id number]. Patient did not answer the phone. Left message for the patient to call the office back.

## 2021-11-21 ENCOUNTER — HOSPITAL ENCOUNTER (EMERGENCY)
Age: 61
Discharge: HOME | End: 2021-11-21
Attending: EMERGENCY MEDICINE
Payer: COMMERCIAL

## 2021-11-21 VITALS
WEIGHT: 224 LBS | OXYGEN SATURATION: 98 % | SYSTOLIC BLOOD PRESSURE: 102 MMHG | HEART RATE: 68 BPM | BODY MASS INDEX: 31.36 KG/M2 | TEMPERATURE: 99 F | HEIGHT: 71 IN | RESPIRATION RATE: 16 BRPM | DIASTOLIC BLOOD PRESSURE: 52 MMHG

## 2021-11-21 DIAGNOSIS — R11.0 NAUSEA: Primary | ICD-10-CM

## 2021-11-21 DIAGNOSIS — U07.1 COVID-19: ICD-10-CM

## 2021-11-21 PROCEDURE — 93010 ELECTROCARDIOGRAM REPORT: CPT

## 2021-11-21 PROCEDURE — 81001 URINALYSIS AUTO W/SCOPE: CPT | Performed by: EMERGENCY MEDICINE

## 2021-11-21 PROCEDURE — 93005 ELECTROCARDIOGRAM TRACING: CPT

## 2021-11-21 PROCEDURE — 80053 COMPREHEN METABOLIC PANEL: CPT | Performed by: EMERGENCY MEDICINE

## 2021-11-21 PROCEDURE — 85025 COMPLETE CBC W/AUTO DIFF WBC: CPT | Performed by: EMERGENCY MEDICINE

## 2021-11-21 PROCEDURE — 96375 TX/PRO/DX INJ NEW DRUG ADDON: CPT

## 2021-11-21 PROCEDURE — 96374 THER/PROPH/DIAG INJ IV PUSH: CPT

## 2021-11-21 PROCEDURE — 96361 HYDRATE IV INFUSION ADD-ON: CPT

## 2021-11-21 PROCEDURE — 99284 EMERGENCY DEPT VISIT MOD MDM: CPT

## 2021-11-21 PROCEDURE — 81015 MICROSCOPIC EXAM OF URINE: CPT | Performed by: EMERGENCY MEDICINE

## 2021-11-21 RX ORDER — ONDANSETRON 2 MG/ML
4 INJECTION INTRAMUSCULAR; INTRAVENOUS ONCE
Status: COMPLETED | OUTPATIENT
Start: 2021-11-21 | End: 2021-11-21

## 2021-11-21 RX ORDER — ONDANSETRON 4 MG/1
4 TABLET, ORALLY DISINTEGRATING ORAL EVERY 4 HOURS PRN
Qty: 10 TABLET | Refills: 0 | Status: SHIPPED | OUTPATIENT
Start: 2021-11-21 | End: 2021-11-28

## 2021-11-21 RX ORDER — METOCLOPRAMIDE HYDROCHLORIDE 5 MG/ML
5 INJECTION INTRAMUSCULAR; INTRAVENOUS ONCE
Status: COMPLETED | OUTPATIENT
Start: 2021-11-21 | End: 2021-11-21

## 2021-11-21 RX ORDER — SODIUM CHLORIDE 9 MG/ML
INJECTION, SOLUTION INTRAVENOUS CONTINUOUS
Status: DISCONTINUED | OUTPATIENT
Start: 2021-11-21 | End: 2021-11-21

## 2021-11-21 RX ORDER — METOCLOPRAMIDE 10 MG/1
5 TABLET ORAL 3 TIMES DAILY PRN
Qty: 20 TABLET | Refills: 0 | Status: SHIPPED | OUTPATIENT
Start: 2021-11-21 | End: 2021-12-21

## 2021-11-21 NOTE — ED PROVIDER NOTES
Patient Seen in: THE [facility] Emergency Department In [place]      History   Patient presents with:  Nausea/Vomiting/Diarrhea    Stated Complaint: decreased appetite, dx with covid on 11/9 - symptoms started on 11/6    Subjective:   HPI    Patient is a 60-y Oral   SpO2 11/21/21 1715 97 %   O2 Device 11/21/21 1715 None (Room air)       Current:/69   Pulse 83   Temp 98.5 °F (36.9 °C) (Oral)   Resp 16   Ht 180.3 cm (5' 11\")   Wt 101.6 kg   SpO2 97%   BMI 31.24 kg/m²         Physical Exam  Vitals and nursi components:    Lymphocyte Absolute 0.92 (*)     All other components within normal limits   ICTOTEST - Normal   CBC WITH DIFFERENTIAL WITH PLATELET    Narrative: The following orders were created for panel order CBC With Differential With Platelet.   Pr Dispersible  Take 1 tablet (4 mg total) by mouth every 4 (four) hours as needed for Nausea. Qty: 10 tablet Refills: 0    metoclopramide 10 MG Oral Tab  Take 0.5 tablets (5 mg total) by mouth 3 (three) times daily as needed.   Qty: 20 tablet Refills: 0

## 2021-11-30 RX ORDER — LISINOPRIL 20 MG/1
TABLET ORAL
Qty: 90 TABLET | Refills: 0 | OUTPATIENT
Start: 2021-11-30

## 2021-11-30 NOTE — TELEPHONE ENCOUNTER
Dr. Fermin Estrada, please verify dosing instructions. Pt notes taking 10mg daily, but sig notes 20mg daily. Please advise, thank you!     LOV : 8/6/2021    LF: 9/1/2021

## 2021-12-11 NOTE — TELEPHONE ENCOUNTER
He needs to take at least 50mg of Losartan, 20mg of Lisinopril is like Losartan 50 or 100mg
Patient Yin Diaz calling to report that since starting new bp med Losartan BP readings have been elevated to average of 140/100. Wants to go back to old BP med.   Requests script be sent to [facility] on file last ov 3-10-21
Pt notified of below orders. Rx sent. Pt advise to f/u in office if BP remains high. ER precautions reviewed again with pt. All questions answered, pt expresses understanding.
Pt states his BP has been elevated since switching from Lisinopril 20mg to Losartan 25mg on LOV virtual 3/10. His BP has been running around 140-100. He occasionally gets headaches when it is elevated.   He was changed because he had a cough & it was susp
yes

## 2022-03-04 ENCOUNTER — PATIENT MESSAGE (OUTPATIENT)
Dept: FAMILY MEDICINE CLINIC | Facility: CLINIC | Age: 62
End: 2022-03-04

## 2022-03-04 NOTE — TELEPHONE ENCOUNTER
Alprazolam refill request.  LF 5/1. Pt states he can not get in any sooner with Dr. Sugar Bloom or a partner than mid June. He is still nauseated & decreased appetite. Please advise.

## 2022-03-04 NOTE — TELEPHONE ENCOUNTER
From: Makenna Lyons  Sent: 3/4/2022 2:27 PM CST  To: [facility] Clinical Staff  Subject: colonascopy procedure    I asked but they would not tell me that. told me to search qualified doctors on some kind of website?

## 2022-03-06 RX ORDER — ALPRAZOLAM 0.5 MG/1
0.5 TABLET ORAL
Qty: 30 TABLET | Refills: 0 | Status: SHIPPED | OUTPATIENT
Start: 2022-03-06

## 2022-03-24 ENCOUNTER — PATIENT MESSAGE (OUTPATIENT)
Dept: FAMILY MEDICINE CLINIC | Facility: CLINIC | Age: 62
End: 2022-03-24

## 2022-03-28 RX ORDER — LISINOPRIL 20 MG/1
20 TABLET ORAL DAILY
Qty: 90 TABLET | Refills: 0 | Status: SHIPPED | OUTPATIENT
Start: 2022-03-28

## 2022-04-20 ENCOUNTER — HOSPITAL ENCOUNTER (EMERGENCY)
Age: 62
Discharge: HOME | End: 2022-04-20
Attending: EMERGENCY MEDICINE
Payer: COMMERCIAL

## 2022-04-20 ENCOUNTER — APPOINTMENT (OUTPATIENT)
Dept: CT IMAGING | Age: 62
End: 2022-04-20
Attending: EMERGENCY MEDICINE
Payer: COMMERCIAL

## 2022-04-20 VITALS
TEMPERATURE: 98 F | SYSTOLIC BLOOD PRESSURE: 123 MMHG | DIASTOLIC BLOOD PRESSURE: 69 MMHG | BODY MASS INDEX: 30.1 KG/M2 | HEIGHT: 71 IN | WEIGHT: 215 LBS | OXYGEN SATURATION: 99 % | HEART RATE: 70 BPM | RESPIRATION RATE: 16 BRPM

## 2022-04-20 DIAGNOSIS — R10.9 ABDOMINAL PAIN OF UNKNOWN ETIOLOGY: Primary | ICD-10-CM

## 2022-04-20 LAB
ALBUMIN SERPL-MCNC: 3.8 G/DL (ref 3.4–5)
ALBUMIN/GLOB SERPL: 1.1 {RATIO} (ref 1–2)
ALP LIVER SERPL-CCNC: 75 U/L
ALT SERPL-CCNC: 30 U/L
ANION GAP SERPL CALC-SCNC: 9 MMOL/L (ref 0–18)
AST SERPL-CCNC: 24 U/L (ref 15–37)
BASOPHILS # BLD AUTO: 0.02 X10(3) UL (ref 0–0.2)
BASOPHILS NFR BLD AUTO: 0.3 %
BILIRUB SERPL-MCNC: 1.2 MG/DL (ref 0.1–2)
BILIRUB UR QL STRIP.AUTO: NEGATIVE
BUN BLD-MCNC: 10 MG/DL (ref 7–18)
CALCIUM BLD-MCNC: 8.7 MG/DL (ref 8.5–10.1)
CHLORIDE SERPL-SCNC: 107 MMOL/L (ref 98–112)
CLARITY UR REFRACT.AUTO: CLEAR
CO2 SERPL-SCNC: 22 MMOL/L (ref 21–32)
COLOR UR AUTO: YELLOW
CREAT BLD-MCNC: 0.95 MG/DL
EOSINOPHIL # BLD AUTO: 0.14 X10(3) UL (ref 0–0.7)
EOSINOPHIL NFR BLD AUTO: 2.3 %
ERYTHROCYTE [DISTWIDTH] IN BLOOD BY AUTOMATED COUNT: 13.2 %
GLOBULIN PLAS-MCNC: 3.5 G/DL (ref 2.8–4.4)
GLUCOSE BLD-MCNC: 104 MG/DL (ref 70–99)
GLUCOSE UR STRIP.AUTO-MCNC: NEGATIVE MG/DL
HCT VFR BLD AUTO: 48.6 %
HGB BLD-MCNC: 17 G/DL
IMM GRANULOCYTES # BLD AUTO: 0.01 X10(3) UL (ref 0–1)
IMM GRANULOCYTES NFR BLD: 0.2 %
KETONES UR STRIP.AUTO-MCNC: NEGATIVE MG/DL
LEUKOCYTE ESTERASE UR QL STRIP.AUTO: NEGATIVE
LIPASE SERPL-CCNC: 68 U/L (ref 73–393)
LYMPHOCYTES # BLD AUTO: 1.19 X10(3) UL (ref 1–4)
LYMPHOCYTES NFR BLD AUTO: 20 %
MCH RBC QN AUTO: 32.2 PG (ref 26–34)
MCHC RBC AUTO-ENTMCNC: 35 G/DL (ref 31–37)
MCV RBC AUTO: 92 FL
MONOCYTES # BLD AUTO: 0.54 X10(3) UL (ref 0.1–1)
MONOCYTES NFR BLD AUTO: 9.1 %
NEUTROPHILS # BLD AUTO: 4.06 X10 (3) UL (ref 1.5–7.7)
NEUTROPHILS # BLD AUTO: 4.06 X10(3) UL (ref 1.5–7.7)
NEUTROPHILS NFR BLD AUTO: 68.1 %
NITRITE UR QL STRIP.AUTO: NEGATIVE
OSMOLALITY SERPL CALC.SUM OF ELEC: 285 MOSM/KG (ref 275–295)
PH UR STRIP.AUTO: 7 [PH] (ref 5–8)
PLATELET # BLD AUTO: 226 10(3)UL (ref 150–450)
POTASSIUM SERPL-SCNC: 3.9 MMOL/L (ref 3.5–5.1)
PROT SERPL-MCNC: 7.3 G/DL (ref 6.4–8.2)
PROT UR STRIP.AUTO-MCNC: NEGATIVE MG/DL
RBC # BLD AUTO: 5.28 X10(6)UL
RBC UR QL AUTO: NEGATIVE
SODIUM SERPL-SCNC: 138 MMOL/L (ref 136–145)
SP GR UR STRIP.AUTO: 1.01 (ref 1–1.03)
UROBILINOGEN UR STRIP.AUTO-MCNC: 0.2 MG/DL
WBC # BLD AUTO: 6 X10(3) UL (ref 4–11)

## 2022-04-20 PROCEDURE — 81003 URINALYSIS AUTO W/O SCOPE: CPT | Performed by: EMERGENCY MEDICINE

## 2022-04-20 PROCEDURE — 99284 EMERGENCY DEPT VISIT MOD MDM: CPT

## 2022-04-20 PROCEDURE — 96374 THER/PROPH/DIAG INJ IV PUSH: CPT

## 2022-04-20 PROCEDURE — 96375 TX/PRO/DX INJ NEW DRUG ADDON: CPT

## 2022-04-20 PROCEDURE — 80053 COMPREHEN METABOLIC PANEL: CPT | Performed by: EMERGENCY MEDICINE

## 2022-04-20 PROCEDURE — 74177 CT ABD & PELVIS W/CONTRAST: CPT | Performed by: EMERGENCY MEDICINE

## 2022-04-20 PROCEDURE — 83690 ASSAY OF LIPASE: CPT | Performed by: EMERGENCY MEDICINE

## 2022-04-20 PROCEDURE — 85025 COMPLETE CBC W/AUTO DIFF WBC: CPT | Performed by: EMERGENCY MEDICINE

## 2022-04-20 RX ORDER — KETOROLAC TROMETHAMINE 15 MG/ML
15 INJECTION, SOLUTION INTRAMUSCULAR; INTRAVENOUS ONCE
Status: COMPLETED | OUTPATIENT
Start: 2022-04-20 | End: 2022-04-20

## 2022-04-20 RX ORDER — ONDANSETRON 2 MG/ML
4 INJECTION INTRAMUSCULAR; INTRAVENOUS ONCE
Status: COMPLETED | OUTPATIENT
Start: 2022-04-20 | End: 2022-04-20

## 2022-04-20 RX ORDER — IOHEXOL 350 MG/ML
100 INJECTION, SOLUTION INTRAVENOUS
Status: COMPLETED | OUTPATIENT
Start: 2022-04-20 | End: 2022-04-20

## 2022-05-19 ENCOUNTER — PATIENT MESSAGE (OUTPATIENT)
Dept: SURGERY | Facility: CLINIC | Age: 62
End: 2022-05-19

## 2022-05-19 DIAGNOSIS — R06.02 SOB (SHORTNESS OF BREATH): Primary | ICD-10-CM

## 2022-05-20 ENCOUNTER — MED REC SCAN ONLY (OUTPATIENT)
Dept: FAMILY MEDICINE CLINIC | Facility: CLINIC | Age: 62
End: 2022-05-20

## 2022-06-08 ENCOUNTER — HOSPITAL ENCOUNTER (EMERGENCY)
Age: 62
Discharge: HOME | End: 2022-06-09
Attending: EMERGENCY MEDICINE
Payer: COMMERCIAL

## 2022-06-08 DIAGNOSIS — R10.84 ABDOMINAL PAIN, GENERALIZED: Primary | ICD-10-CM

## 2022-06-08 PROCEDURE — 99284 EMERGENCY DEPT VISIT MOD MDM: CPT

## 2022-06-08 PROCEDURE — 96375 TX/PRO/DX INJ NEW DRUG ADDON: CPT

## 2022-06-08 PROCEDURE — 96374 THER/PROPH/DIAG INJ IV PUSH: CPT

## 2022-06-09 ENCOUNTER — APPOINTMENT (OUTPATIENT)
Dept: CT IMAGING | Age: 62
End: 2022-06-09
Attending: EMERGENCY MEDICINE
Payer: COMMERCIAL

## 2022-06-09 VITALS
BODY MASS INDEX: 30.1 KG/M2 | HEIGHT: 71 IN | HEART RATE: 82 BPM | TEMPERATURE: 98 F | SYSTOLIC BLOOD PRESSURE: 116 MMHG | RESPIRATION RATE: 14 BRPM | OXYGEN SATURATION: 98 % | DIASTOLIC BLOOD PRESSURE: 76 MMHG | WEIGHT: 215 LBS

## 2022-06-09 LAB
ANION GAP SERPL CALC-SCNC: 8 MMOL/L (ref 0–18)
BASOPHILS # BLD AUTO: 0.02 X10(3) UL (ref 0–0.2)
BASOPHILS NFR BLD AUTO: 0.2 %
BILIRUB UR QL STRIP.AUTO: NEGATIVE
BUN BLD-MCNC: 13 MG/DL (ref 7–18)
CALCIUM BLD-MCNC: 8.9 MG/DL (ref 8.5–10.1)
CHLORIDE SERPL-SCNC: 107 MMOL/L (ref 98–112)
CLARITY UR REFRACT.AUTO: CLEAR
CO2 SERPL-SCNC: 20 MMOL/L (ref 21–32)
COLOR UR AUTO: YELLOW
CREAT BLD-MCNC: 1.02 MG/DL
EOSINOPHIL # BLD AUTO: 0.03 X10(3) UL (ref 0–0.7)
EOSINOPHIL NFR BLD AUTO: 0.3 %
ERYTHROCYTE [DISTWIDTH] IN BLOOD BY AUTOMATED COUNT: 13.9 %
GLUCOSE BLD-MCNC: 130 MG/DL (ref 70–99)
GLUCOSE UR STRIP.AUTO-MCNC: NEGATIVE MG/DL
HCT VFR BLD AUTO: 49.9 %
HGB BLD-MCNC: 17.3 G/DL
IMM GRANULOCYTES # BLD AUTO: 0.02 X10(3) UL (ref 0–1)
IMM GRANULOCYTES NFR BLD: 0.2 %
LEUKOCYTE ESTERASE UR QL STRIP.AUTO: NEGATIVE
LYMPHOCYTES # BLD AUTO: 0.64 X10(3) UL (ref 1–4)
LYMPHOCYTES NFR BLD AUTO: 6.8 %
MCH RBC QN AUTO: 33.3 PG (ref 26–34)
MCHC RBC AUTO-ENTMCNC: 34.7 G/DL (ref 31–37)
MCV RBC AUTO: 96 FL
MONOCYTES # BLD AUTO: 0.71 X10(3) UL (ref 0.1–1)
MONOCYTES NFR BLD AUTO: 7.6 %
NEUTROPHILS # BLD AUTO: 7.97 X10 (3) UL (ref 1.5–7.7)
NEUTROPHILS # BLD AUTO: 7.97 X10(3) UL (ref 1.5–7.7)
NEUTROPHILS NFR BLD AUTO: 84.9 %
NITRITE UR QL STRIP.AUTO: NEGATIVE
OSMOLALITY SERPL CALC.SUM OF ELEC: 282 MOSM/KG (ref 275–295)
PH UR STRIP.AUTO: 5.5 [PH] (ref 5–8)
PLATELET # BLD AUTO: 207 10(3)UL (ref 150–450)
POTASSIUM SERPL-SCNC: 4 MMOL/L (ref 3.5–5.1)
PROT UR STRIP.AUTO-MCNC: NEGATIVE MG/DL
RBC # BLD AUTO: 5.2 X10(6)UL
RBC UR QL AUTO: NEGATIVE
SODIUM SERPL-SCNC: 135 MMOL/L (ref 136–145)
SP GR UR STRIP.AUTO: <=1.005 (ref 1–1.03)
UROBILINOGEN UR STRIP.AUTO-MCNC: 0.2 MG/DL
WBC # BLD AUTO: 9.4 X10(3) UL (ref 4–11)

## 2022-06-09 PROCEDURE — 81003 URINALYSIS AUTO W/O SCOPE: CPT | Performed by: EMERGENCY MEDICINE

## 2022-06-09 PROCEDURE — 80048 BASIC METABOLIC PNL TOTAL CA: CPT

## 2022-06-09 PROCEDURE — 85025 COMPLETE CBC W/AUTO DIFF WBC: CPT

## 2022-06-09 PROCEDURE — 74177 CT ABD & PELVIS W/CONTRAST: CPT | Performed by: EMERGENCY MEDICINE

## 2022-06-09 PROCEDURE — 80048 BASIC METABOLIC PNL TOTAL CA: CPT | Performed by: EMERGENCY MEDICINE

## 2022-06-09 PROCEDURE — 85025 COMPLETE CBC W/AUTO DIFF WBC: CPT | Performed by: EMERGENCY MEDICINE

## 2022-06-09 RX ORDER — MORPHINE SULFATE 4 MG/ML
4 INJECTION, SOLUTION INTRAMUSCULAR; INTRAVENOUS ONCE
Status: COMPLETED | OUTPATIENT
Start: 2022-06-09 | End: 2022-06-09

## 2022-06-09 RX ORDER — ONDANSETRON 2 MG/ML
4 INJECTION INTRAMUSCULAR; INTRAVENOUS ONCE
Status: COMPLETED | OUTPATIENT
Start: 2022-06-09 | End: 2022-06-09

## 2022-06-22 ENCOUNTER — OFFICE VISIT (OUTPATIENT)
Dept: FAMILY MEDICINE CLINIC | Facility: CLINIC | Age: 62
End: 2022-06-22
Payer: COMMERCIAL

## 2022-06-22 VITALS
WEIGHT: 221 LBS | DIASTOLIC BLOOD PRESSURE: 84 MMHG | HEIGHT: 71 IN | SYSTOLIC BLOOD PRESSURE: 120 MMHG | OXYGEN SATURATION: 98 % | RESPIRATION RATE: 16 BRPM | BODY MASS INDEX: 30.94 KG/M2 | HEART RATE: 73 BPM

## 2022-06-22 DIAGNOSIS — Z13.29 SCREENING FOR ENDOCRINE, NUTRITIONAL, METABOLIC AND IMMUNITY DISORDER: ICD-10-CM

## 2022-06-22 DIAGNOSIS — Z12.5 SCREENING FOR PROSTATE CANCER: ICD-10-CM

## 2022-06-22 DIAGNOSIS — M54.2 CERVICALGIA: ICD-10-CM

## 2022-06-22 DIAGNOSIS — Z13.228 SCREENING FOR ENDOCRINE, NUTRITIONAL, METABOLIC AND IMMUNITY DISORDER: ICD-10-CM

## 2022-06-22 DIAGNOSIS — R93.2 ABNORMAL LIVER CT: ICD-10-CM

## 2022-06-22 DIAGNOSIS — Z13.21 SCREENING FOR ENDOCRINE, NUTRITIONAL, METABOLIC AND IMMUNITY DISORDER: ICD-10-CM

## 2022-06-22 DIAGNOSIS — Z13.0 SCREENING FOR ENDOCRINE, NUTRITIONAL, METABOLIC AND IMMUNITY DISORDER: ICD-10-CM

## 2022-06-22 DIAGNOSIS — Z00.00 ROUTINE GENERAL MEDICAL EXAMINATION AT A HEALTH CARE FACILITY: Primary | ICD-10-CM

## 2022-06-22 PROCEDURE — 99396 PREV VISIT EST AGE 40-64: CPT | Performed by: FAMILY MEDICINE

## 2022-06-22 PROCEDURE — 3079F DIAST BP 80-89 MM HG: CPT | Performed by: FAMILY MEDICINE

## 2022-06-22 PROCEDURE — 3008F BODY MASS INDEX DOCD: CPT | Performed by: FAMILY MEDICINE

## 2022-06-22 PROCEDURE — 3074F SYST BP LT 130 MM HG: CPT | Performed by: FAMILY MEDICINE

## 2022-06-22 RX ORDER — PREDNISONE 20 MG/1
TABLET ORAL
Qty: 10 TABLET | Refills: 0 | Status: SHIPPED | OUTPATIENT
Start: 2022-06-22

## 2022-06-23 ENCOUNTER — MED REC SCAN ONLY (OUTPATIENT)
Dept: FAMILY MEDICINE CLINIC | Facility: CLINIC | Age: 62
End: 2022-06-23

## 2022-06-26 DIAGNOSIS — I10 HYPERTENSION, UNSPECIFIED TYPE: ICD-10-CM

## 2022-06-26 DIAGNOSIS — F41.1 GAD (GENERALIZED ANXIETY DISORDER): ICD-10-CM

## 2022-06-27 RX ORDER — ALPRAZOLAM 0.5 MG/1
0.5 TABLET ORAL
Qty: 30 TABLET | Refills: 0 | Status: SHIPPED | OUTPATIENT
Start: 2022-06-27

## 2022-07-13 RX ORDER — LISINOPRIL 20 MG/1
TABLET ORAL
Qty: 90 TABLET | Refills: 1 | Status: SHIPPED | OUTPATIENT
Start: 2022-07-13

## 2022-07-15 ENCOUNTER — MED REC SCAN ONLY (OUTPATIENT)
Dept: FAMILY MEDICINE CLINIC | Facility: CLINIC | Age: 62
End: 2022-07-15

## 2022-08-03 ENCOUNTER — HOSPITAL ENCOUNTER (OUTPATIENT)
Dept: ULTRASOUND IMAGING | Age: 62
Discharge: HOME | End: 2022-08-03
Attending: FAMILY MEDICINE
Payer: COMMERCIAL

## 2022-08-03 ENCOUNTER — TELEPHONE (OUTPATIENT)
Dept: FAMILY MEDICINE CLINIC | Facility: CLINIC | Age: 62
End: 2022-08-03

## 2022-08-03 DIAGNOSIS — K76.0 FATTY LIVER: Primary | ICD-10-CM

## 2022-08-03 DIAGNOSIS — R93.2 ABNORMAL LIVER CT: ICD-10-CM

## 2022-08-03 PROCEDURE — 76700 US EXAM ABDOM COMPLETE: CPT | Performed by: FAMILY MEDICINE

## 2022-08-03 NOTE — TELEPHONE ENCOUNTER
----- Message from Lizzie Alfaro MD sent at 8/3/2022 12:51 PM CDT -----  Weight loss, limited Tylenol, alcohol and recheck Liver US in 4-5 months.    mychart to pt and order in system

## 2022-08-04 ENCOUNTER — OFFICE VISIT (OUTPATIENT)
Dept: SLEEP CENTER | Age: 62
End: 2022-08-04
Attending: INTERNAL MEDICINE
Payer: COMMERCIAL

## 2022-08-04 DIAGNOSIS — R06.83 SNORING: ICD-10-CM

## 2022-08-04 DIAGNOSIS — R06.02 SOB (SHORTNESS OF BREATH): ICD-10-CM

## 2022-08-04 DIAGNOSIS — I10 ESSENTIAL HYPERTENSION: ICD-10-CM

## 2022-08-04 PROCEDURE — 95806 SLEEP STUDY UNATT&RESP EFFT: CPT

## 2022-09-12 ENCOUNTER — PATIENT MESSAGE (OUTPATIENT)
Dept: FAMILY MEDICINE CLINIC | Facility: CLINIC | Age: 62
End: 2022-09-12

## 2022-09-12 ENCOUNTER — TELEPHONE (OUTPATIENT)
Dept: FAMILY MEDICINE CLINIC | Facility: CLINIC | Age: 62
End: 2022-09-12

## 2022-09-12 NOTE — TELEPHONE ENCOUNTER
----- Message from Bandar Mccabe MD sent at 9/12/2022 11:34 AM CDT -----  Laura Yoon,    Please tell pt that I have ordered him for 40$ chest CT that will check his heart         [facility]     [address] [place] [address]. Ethan Tripp   Ph: [phone]   Fax: [phone]        His mother will see Dr. Rd Evangelista. Dr. Wen Be, DO    Neurology  [address]    Phone: [phone] Dr Rudy Gardner, [address] [facility]    Phone: [phone]     Thank you.

## 2022-10-17 ENCOUNTER — MED REC SCAN ONLY (OUTPATIENT)
Dept: FAMILY MEDICINE CLINIC | Facility: CLINIC | Age: 62
End: 2022-10-17

## 2022-11-03 DIAGNOSIS — I10 HYPERTENSION, UNSPECIFIED TYPE: ICD-10-CM

## 2022-11-03 DIAGNOSIS — F41.1 GAD (GENERALIZED ANXIETY DISORDER): ICD-10-CM

## 2022-11-04 RX ORDER — ALPRAZOLAM 0.5 MG/1
TABLET ORAL
Qty: 30 TABLET | Refills: 0 | Status: SHIPPED | OUTPATIENT
Start: 2022-11-04

## 2022-11-12 ENCOUNTER — HOSPITAL ENCOUNTER (EMERGENCY)
Age: 62
Discharge: HOME | End: 2022-11-13
Attending: EMERGENCY MEDICINE
Payer: COMMERCIAL

## 2022-11-12 ENCOUNTER — APPOINTMENT (OUTPATIENT)
Dept: GENERAL RADIOLOGY | Age: 62
End: 2022-11-12
Attending: EMERGENCY MEDICINE
Payer: COMMERCIAL

## 2022-11-12 DIAGNOSIS — R07.89 CHEST PAIN, NON-CARDIAC: Primary | ICD-10-CM

## 2022-11-12 DIAGNOSIS — I10 HYPERTENSION, UNSPECIFIED TYPE: ICD-10-CM

## 2022-11-12 LAB
ALBUMIN SERPL-MCNC: 3.9 G/DL (ref 3.4–5)
ALBUMIN/GLOB SERPL: 1.1 {RATIO} (ref 1–2)
ALP LIVER SERPL-CCNC: 69 U/L
ALT SERPL-CCNC: 38 U/L
ANION GAP SERPL CALC-SCNC: 6 MMOL/L (ref 0–18)
AST SERPL-CCNC: 29 U/L (ref 15–37)
BASOPHILS # BLD AUTO: 0.01 X10(3) UL (ref 0–0.2)
BASOPHILS NFR BLD AUTO: 0.1 %
BILIRUB SERPL-MCNC: 0.8 MG/DL (ref 0.1–2)
BUN BLD-MCNC: 17 MG/DL (ref 7–18)
CALCIUM BLD-MCNC: 8.7 MG/DL (ref 8.5–10.1)
CHLORIDE SERPL-SCNC: 107 MMOL/L (ref 98–112)
CO2 SERPL-SCNC: 23 MMOL/L (ref 21–32)
CREAT BLD-MCNC: 0.92 MG/DL
EOSINOPHIL # BLD AUTO: 0.13 X10(3) UL (ref 0–0.7)
EOSINOPHIL NFR BLD AUTO: 1.9 %
ERYTHROCYTE [DISTWIDTH] IN BLOOD BY AUTOMATED COUNT: 12.5 %
GFR SERPLBLD BASED ON 1.73 SQ M-ARVRAT: 94 ML/MIN/1.73M2 (ref 60–?)
GLOBULIN PLAS-MCNC: 3.7 G/DL (ref 2.8–4.4)
GLUCOSE BLD-MCNC: 96 MG/DL (ref 70–99)
HCT VFR BLD AUTO: 45.3 %
HGB BLD-MCNC: 16.1 G/DL
IMM GRANULOCYTES # BLD AUTO: 0.02 X10(3) UL (ref 0–1)
IMM GRANULOCYTES NFR BLD: 0.3 %
LIPASE SERPL-CCNC: 102 U/L (ref 73–393)
LYMPHOCYTES # BLD AUTO: 1.33 X10(3) UL (ref 1–4)
LYMPHOCYTES NFR BLD AUTO: 19.7 %
MCH RBC QN AUTO: 33.3 PG (ref 26–34)
MCHC RBC AUTO-ENTMCNC: 35.5 G/DL (ref 31–37)
MCV RBC AUTO: 93.8 FL
MONOCYTES # BLD AUTO: 0.49 X10(3) UL (ref 0.1–1)
MONOCYTES NFR BLD AUTO: 7.3 %
NEUTROPHILS # BLD AUTO: 4.76 X10 (3) UL (ref 1.5–7.7)
NEUTROPHILS # BLD AUTO: 4.76 X10(3) UL (ref 1.5–7.7)
NEUTROPHILS NFR BLD AUTO: 70.7 %
OSMOLALITY SERPL CALC.SUM OF ELEC: 283 MOSM/KG (ref 275–295)
PLATELET # BLD AUTO: 229 10(3)UL (ref 150–450)
POTASSIUM SERPL-SCNC: 4 MMOL/L (ref 3.5–5.1)
PROT SERPL-MCNC: 7.6 G/DL (ref 6.4–8.2)
RBC # BLD AUTO: 4.83 X10(6)UL
SODIUM SERPL-SCNC: 136 MMOL/L (ref 136–145)
TROPONIN I HIGH SENSITIVITY: 22 NG/L
WBC # BLD AUTO: 6.7 X10(3) UL (ref 4–11)

## 2022-11-12 PROCEDURE — 83690 ASSAY OF LIPASE: CPT | Performed by: EMERGENCY MEDICINE

## 2022-11-12 PROCEDURE — 99284 EMERGENCY DEPT VISIT MOD MDM: CPT

## 2022-11-12 PROCEDURE — 80053 COMPREHEN METABOLIC PANEL: CPT | Performed by: EMERGENCY MEDICINE

## 2022-11-12 PROCEDURE — 84484 ASSAY OF TROPONIN QUANT: CPT | Performed by: EMERGENCY MEDICINE

## 2022-11-12 PROCEDURE — 93010 ELECTROCARDIOGRAM REPORT: CPT

## 2022-11-12 PROCEDURE — 36415 COLL VENOUS BLD VENIPUNCTURE: CPT

## 2022-11-12 PROCEDURE — 85025 COMPLETE CBC W/AUTO DIFF WBC: CPT | Performed by: EMERGENCY MEDICINE

## 2022-11-12 PROCEDURE — 93005 ELECTROCARDIOGRAM TRACING: CPT

## 2022-11-12 PROCEDURE — 71045 X-RAY EXAM CHEST 1 VIEW: CPT | Performed by: EMERGENCY MEDICINE

## 2022-11-13 VITALS
BODY MASS INDEX: 31.5 KG/M2 | WEIGHT: 225 LBS | HEIGHT: 71 IN | OXYGEN SATURATION: 98 % | SYSTOLIC BLOOD PRESSURE: 137 MMHG | TEMPERATURE: 98 F | HEART RATE: 71 BPM | RESPIRATION RATE: 16 BRPM | DIASTOLIC BLOOD PRESSURE: 92 MMHG

## 2022-11-13 LAB
ATRIAL RATE: 78 BPM
P AXIS: 46 DEGREES
P-R INTERVAL: 190 MS
Q-T INTERVAL: 370 MS
QRS DURATION: 84 MS
QTC CALCULATION (BEZET): 421 MS
R AXIS: -35 DEGREES
T AXIS: 47 DEGREES
TROPONIN I HIGH SENSITIVITY: 20 NG/L
VENTRICULAR RATE: 78 BPM

## 2022-11-13 PROCEDURE — 84484 ASSAY OF TROPONIN QUANT: CPT | Performed by: EMERGENCY MEDICINE

## 2022-12-13 ENCOUNTER — PATIENT MESSAGE (OUTPATIENT)
Dept: FAMILY MEDICINE CLINIC | Facility: CLINIC | Age: 62
End: 2022-12-13

## 2022-12-13 DIAGNOSIS — R07.9 CHEST PAIN, UNSPECIFIED TYPE: Primary | ICD-10-CM

## 2023-01-17 ENCOUNTER — LAB ENCOUNTER (OUTPATIENT)
Dept: LAB | Age: 63
End: 2023-01-17
Attending: INTERNAL MEDICINE
Payer: COMMERCIAL

## 2023-01-17 DIAGNOSIS — Z13.0 SCREENING FOR ENDOCRINE, NUTRITIONAL, METABOLIC AND IMMUNITY DISORDER: ICD-10-CM

## 2023-01-17 DIAGNOSIS — Z13.29 SCREENING FOR ENDOCRINE, NUTRITIONAL, METABOLIC AND IMMUNITY DISORDER: ICD-10-CM

## 2023-01-17 DIAGNOSIS — Z12.5 SCREENING FOR PROSTATE CANCER: ICD-10-CM

## 2023-01-17 DIAGNOSIS — Z13.228 SCREENING FOR ENDOCRINE, NUTRITIONAL, METABOLIC AND IMMUNITY DISORDER: ICD-10-CM

## 2023-01-17 DIAGNOSIS — Z13.21 SCREENING FOR ENDOCRINE, NUTRITIONAL, METABOLIC AND IMMUNITY DISORDER: ICD-10-CM

## 2023-01-17 LAB
ALBUMIN SERPL-MCNC: 3.8 G/DL (ref 3.4–5)
ALBUMIN/GLOB SERPL: 1 {RATIO} (ref 1–2)
ALP LIVER SERPL-CCNC: 70 U/L
ALT SERPL-CCNC: 44 U/L
ANION GAP SERPL CALC-SCNC: 5 MMOL/L (ref 0–18)
AST SERPL-CCNC: 26 U/L (ref 15–37)
BASOPHILS # BLD AUTO: 0.02 X10(3) UL (ref 0–0.2)
BASOPHILS NFR BLD AUTO: 0.5 %
BILIRUB SERPL-MCNC: 0.6 MG/DL (ref 0.1–2)
BUN BLD-MCNC: 13 MG/DL (ref 7–18)
CALCIUM BLD-MCNC: 8.9 MG/DL (ref 8.5–10.1)
CHLORIDE SERPL-SCNC: 108 MMOL/L (ref 98–112)
CHOLEST SERPL-MCNC: 224 MG/DL (ref ?–200)
CO2 SERPL-SCNC: 23 MMOL/L (ref 21–32)
COMPLEXED PSA SERPL-MCNC: 1.08 NG/ML (ref ?–4)
CREAT BLD-MCNC: 1.01 MG/DL
EOSINOPHIL # BLD AUTO: 0.19 X10(3) UL (ref 0–0.7)
EOSINOPHIL NFR BLD AUTO: 5 %
ERYTHROCYTE [DISTWIDTH] IN BLOOD BY AUTOMATED COUNT: 13.2 %
FASTING PATIENT LIPID ANSWER: YES
FASTING STATUS PATIENT QL REPORTED: YES
GFR SERPLBLD BASED ON 1.73 SQ M-ARVRAT: 84 ML/MIN/1.73M2 (ref 60–?)
GLOBULIN PLAS-MCNC: 3.7 G/DL (ref 2.8–4.4)
GLUCOSE BLD-MCNC: 92 MG/DL (ref 70–99)
HCT VFR BLD AUTO: 50.6 %
HDLC SERPL-MCNC: 51 MG/DL (ref 40–59)
HGB BLD-MCNC: 17.3 G/DL
IMM GRANULOCYTES # BLD AUTO: 0.01 X10(3) UL (ref 0–1)
IMM GRANULOCYTES NFR BLD: 0.3 %
LDLC SERPL CALC-MCNC: 142 MG/DL (ref ?–100)
LYMPHOCYTES # BLD AUTO: 1.04 X10(3) UL (ref 1–4)
LYMPHOCYTES NFR BLD AUTO: 27.2 %
MCH RBC QN AUTO: 34.1 PG (ref 26–34)
MCHC RBC AUTO-ENTMCNC: 34.2 G/DL (ref 31–37)
MCV RBC AUTO: 99.8 FL
MONOCYTES # BLD AUTO: 0.36 X10(3) UL (ref 0.1–1)
MONOCYTES NFR BLD AUTO: 9.4 %
NEUTROPHILS # BLD AUTO: 2.21 X10 (3) UL (ref 1.5–7.7)
NEUTROPHILS # BLD AUTO: 2.21 X10(3) UL (ref 1.5–7.7)
NEUTROPHILS NFR BLD AUTO: 57.6 %
NONHDLC SERPL-MCNC: 173 MG/DL (ref ?–130)
OSMOLALITY SERPL CALC.SUM OF ELEC: 282 MOSM/KG (ref 275–295)
PLATELET # BLD AUTO: 224 10(3)UL (ref 150–450)
POTASSIUM SERPL-SCNC: 4.3 MMOL/L (ref 3.5–5.1)
PROT SERPL-MCNC: 7.5 G/DL (ref 6.4–8.2)
RBC # BLD AUTO: 5.07 X10(6)UL
SODIUM SERPL-SCNC: 136 MMOL/L (ref 136–145)
TRIGL SERPL-MCNC: 172 MG/DL (ref 30–149)
TSI SER-ACNC: 0.85 MIU/ML (ref 0.36–3.74)
VIT D+METAB SERPL-MCNC: 13.8 NG/ML (ref 30–100)
VLDLC SERPL CALC-MCNC: 32 MG/DL (ref 0–30)
WBC # BLD AUTO: 3.8 X10(3) UL (ref 4–11)

## 2023-01-17 PROCEDURE — 85025 COMPLETE CBC W/AUTO DIFF WBC: CPT

## 2023-01-17 PROCEDURE — 80053 COMPREHEN METABOLIC PANEL: CPT

## 2023-01-17 PROCEDURE — 84443 ASSAY THYROID STIM HORMONE: CPT

## 2023-01-17 PROCEDURE — 80061 LIPID PANEL: CPT

## 2023-01-17 PROCEDURE — 36415 COLL VENOUS BLD VENIPUNCTURE: CPT

## 2023-01-17 PROCEDURE — 82306 VITAMIN D 25 HYDROXY: CPT

## 2023-01-18 ENCOUNTER — TELEPHONE (OUTPATIENT)
Dept: FAMILY MEDICINE CLINIC | Facility: CLINIC | Age: 63
End: 2023-01-18

## 2023-01-18 RX ORDER — ERGOCALCIFEROL 1.25 MG/1
50000 CAPSULE ORAL WEEKLY
Qty: 4 CAPSULE | Refills: 2 | Status: SHIPPED | OUTPATIENT
Start: 2023-01-18 | End: 2023-02-17

## 2023-01-18 NOTE — TELEPHONE ENCOUNTER
----- Message from Keri Mancera MD sent at 1/18/2023  9:59 AM CST -----  Vitamin D level is low.  Please send Rx for 50,000U per week for 3 months.      mychart to pt and med to rx

## 2023-01-19 ENCOUNTER — MED REC SCAN ONLY (OUTPATIENT)
Dept: FAMILY MEDICINE CLINIC | Facility: CLINIC | Age: 63
End: 2023-01-19

## 2023-01-30 RX ORDER — LISINOPRIL 20 MG/1
TABLET ORAL
Qty: 90 TABLET | Refills: 1 | Status: SHIPPED | OUTPATIENT
Start: 2023-01-30

## 2023-03-03 ENCOUNTER — PATIENT MESSAGE (OUTPATIENT)
Dept: ADMINISTRATIVE | Facility: HOSPITAL | Age: 63
End: 2023-03-03

## 2023-03-29 ENCOUNTER — HOSPITAL ENCOUNTER (EMERGENCY)
Age: 63
Discharge: HOME | End: 2023-03-29
Attending: EMERGENCY MEDICINE
Payer: COMMERCIAL

## 2023-03-29 ENCOUNTER — APPOINTMENT (OUTPATIENT)
Dept: GENERAL RADIOLOGY | Age: 63
End: 2023-03-29
Payer: COMMERCIAL

## 2023-03-29 VITALS
HEART RATE: 66 BPM | WEIGHT: 260 LBS | DIASTOLIC BLOOD PRESSURE: 79 MMHG | HEIGHT: 71 IN | RESPIRATION RATE: 13 BRPM | SYSTOLIC BLOOD PRESSURE: 110 MMHG | BODY MASS INDEX: 36.4 KG/M2 | TEMPERATURE: 98 F | OXYGEN SATURATION: 98 %

## 2023-03-29 DIAGNOSIS — R00.2 PALPITATIONS: ICD-10-CM

## 2023-03-29 DIAGNOSIS — S29.012A STRAIN OF THORACIC SPINE: Primary | ICD-10-CM

## 2023-03-29 LAB
ALBUMIN SERPL-MCNC: 3.9 G/DL (ref 3.4–5)
ALBUMIN/GLOB SERPL: 1.1 {RATIO} (ref 1–2)
ALP LIVER SERPL-CCNC: 66 U/L
ALT SERPL-CCNC: 43 U/L
ANION GAP SERPL CALC-SCNC: 7 MMOL/L (ref 0–18)
AST SERPL-CCNC: 24 U/L (ref 15–37)
ATRIAL RATE: 67 BPM
BASOPHILS # BLD AUTO: 0.02 X10(3) UL (ref 0–0.2)
BASOPHILS NFR BLD AUTO: 0.4 %
BILIRUB SERPL-MCNC: 0.6 MG/DL (ref 0.1–2)
BUN BLD-MCNC: 14 MG/DL (ref 7–18)
CALCIUM BLD-MCNC: 9 MG/DL (ref 8.5–10.1)
CHLORIDE SERPL-SCNC: 106 MMOL/L (ref 98–112)
CO2 SERPL-SCNC: 23 MMOL/L (ref 21–32)
CREAT BLD-MCNC: 1.06 MG/DL
EOSINOPHIL # BLD AUTO: 0.1 X10(3) UL (ref 0–0.7)
EOSINOPHIL NFR BLD AUTO: 1.8 %
ERYTHROCYTE [DISTWIDTH] IN BLOOD BY AUTOMATED COUNT: 12.3 %
GFR SERPLBLD BASED ON 1.73 SQ M-ARVRAT: 79 ML/MIN/1.73M2 (ref 60–?)
GLOBULIN PLAS-MCNC: 3.4 G/DL (ref 2.8–4.4)
GLUCOSE BLD-MCNC: 122 MG/DL (ref 70–99)
HCT VFR BLD AUTO: 45.6 %
HGB BLD-MCNC: 16.5 G/DL
IMM GRANULOCYTES # BLD AUTO: 0.06 X10(3) UL (ref 0–1)
IMM GRANULOCYTES NFR BLD: 1.1 %
LYMPHOCYTES # BLD AUTO: 0.85 X10(3) UL (ref 1–4)
LYMPHOCYTES NFR BLD AUTO: 15.2 %
MCH RBC QN AUTO: 34.2 PG (ref 26–34)
MCHC RBC AUTO-ENTMCNC: 36.2 G/DL (ref 31–37)
MCV RBC AUTO: 94.4 FL
MONOCYTES # BLD AUTO: 0.51 X10(3) UL (ref 0.1–1)
MONOCYTES NFR BLD AUTO: 9.1 %
NEUTROPHILS # BLD AUTO: 4.06 X10 (3) UL (ref 1.5–7.7)
NEUTROPHILS # BLD AUTO: 4.06 X10(3) UL (ref 1.5–7.7)
NEUTROPHILS NFR BLD AUTO: 72.4 %
OSMOLALITY SERPL CALC.SUM OF ELEC: 284 MOSM/KG (ref 275–295)
P AXIS: 38 DEGREES
P-R INTERVAL: 192 MS
PLATELET # BLD AUTO: 213 10(3)UL (ref 150–450)
POTASSIUM SERPL-SCNC: 4.2 MMOL/L (ref 3.5–5.1)
PROT SERPL-MCNC: 7.3 G/DL (ref 6.4–8.2)
Q-T INTERVAL: 376 MS
QRS DURATION: 92 MS
QTC CALCULATION (BEZET): 397 MS
R AXIS: -32 DEGREES
RBC # BLD AUTO: 4.83 X10(6)UL
SODIUM SERPL-SCNC: 136 MMOL/L (ref 136–145)
T AXIS: 33 DEGREES
TROPONIN I HIGH SENSITIVITY: 18 NG/L
VENTRICULAR RATE: 67 BPM
WBC # BLD AUTO: 5.6 X10(3) UL (ref 4–11)

## 2023-03-29 PROCEDURE — 93005 ELECTROCARDIOGRAM TRACING: CPT

## 2023-03-29 PROCEDURE — 84484 ASSAY OF TROPONIN QUANT: CPT | Performed by: EMERGENCY MEDICINE

## 2023-03-29 PROCEDURE — 93010 ELECTROCARDIOGRAM REPORT: CPT

## 2023-03-29 PROCEDURE — 36415 COLL VENOUS BLD VENIPUNCTURE: CPT

## 2023-03-29 PROCEDURE — 85025 COMPLETE CBC W/AUTO DIFF WBC: CPT | Performed by: EMERGENCY MEDICINE

## 2023-03-29 PROCEDURE — 85025 COMPLETE CBC W/AUTO DIFF WBC: CPT

## 2023-03-29 PROCEDURE — 80053 COMPREHEN METABOLIC PANEL: CPT

## 2023-03-29 PROCEDURE — 99285 EMERGENCY DEPT VISIT HI MDM: CPT

## 2023-03-29 PROCEDURE — 99284 EMERGENCY DEPT VISIT MOD MDM: CPT

## 2023-03-29 PROCEDURE — 80053 COMPREHEN METABOLIC PANEL: CPT | Performed by: EMERGENCY MEDICINE

## 2023-03-29 PROCEDURE — 71045 X-RAY EXAM CHEST 1 VIEW: CPT | Performed by: EMERGENCY MEDICINE

## 2023-03-29 RX ORDER — CYCLOBENZAPRINE HCL 10 MG
10 TABLET ORAL 3 TIMES DAILY PRN
Qty: 20 TABLET | Refills: 0 | Status: SHIPPED | OUTPATIENT
Start: 2023-03-29 | End: 2023-04-05

## 2023-03-29 RX ORDER — IBUPROFEN 600 MG/1
600 TABLET ORAL EVERY 8 HOURS PRN
Qty: 30 TABLET | Refills: 0 | Status: SHIPPED | OUTPATIENT
Start: 2023-03-29 | End: 2023-04-08

## 2023-03-29 NOTE — ED INITIAL ASSESSMENT (HPI)
Left side pain started on Monday, sharp left shoulder blade, the pain on left side travels to epigastric area, constant with shooting pain, very tired, short of breath, denies dizziness

## 2023-04-13 ENCOUNTER — E-VISIT (OUTPATIENT)
Dept: TELEHEALTH | Age: 63
End: 2023-04-13

## 2023-04-13 DIAGNOSIS — Z02.9 ADMINISTRATIVE ENCOUNTER: Primary | ICD-10-CM

## 2023-04-28 RX ORDER — ERGOCALCIFEROL 1.25 MG/1
CAPSULE ORAL
Qty: 4 CAPSULE | Refills: 2 | OUTPATIENT
Start: 2023-04-28

## 2023-05-02 DIAGNOSIS — F41.1 GAD (GENERALIZED ANXIETY DISORDER): ICD-10-CM

## 2023-05-02 DIAGNOSIS — I10 HYPERTENSION, UNSPECIFIED TYPE: ICD-10-CM

## 2023-05-03 RX ORDER — ALPRAZOLAM 0.5 MG/1
TABLET ORAL
Qty: 30 TABLET | Refills: 0 | Status: SHIPPED | OUTPATIENT
Start: 2023-05-03

## 2023-05-05 RX ORDER — ALPRAZOLAM 0.5 MG/1
TABLET ORAL
Qty: 60 TABLET | Refills: 5 | Status: SHIPPED | OUTPATIENT
Start: 2023-05-05

## 2023-05-13 ENCOUNTER — MOBILE ENCOUNTER (OUTPATIENT)
Dept: FAMILY MEDICINE CLINIC | Facility: CLINIC | Age: 63
End: 2023-05-13

## 2023-05-13 RX ORDER — CIPROFLOXACIN 500 MG/1
500 TABLET, FILM COATED ORAL 2 TIMES DAILY
Qty: 14 TABLET | Refills: 0 | Status: SHIPPED | OUTPATIENT
Start: 2023-05-13 | End: 2023-05-20

## 2023-05-30 ENCOUNTER — HOSPITAL ENCOUNTER (EMERGENCY)
Age: 63
Discharge: HOME | End: 2023-05-30
Attending: EMERGENCY MEDICINE
Payer: COMMERCIAL

## 2023-05-30 ENCOUNTER — PATIENT MESSAGE (OUTPATIENT)
Dept: FAMILY MEDICINE CLINIC | Facility: CLINIC | Age: 63
End: 2023-05-30

## 2023-05-30 ENCOUNTER — APPOINTMENT (OUTPATIENT)
Dept: GENERAL RADIOLOGY | Age: 63
End: 2023-05-30
Attending: EMERGENCY MEDICINE
Payer: COMMERCIAL

## 2023-05-30 ENCOUNTER — APPOINTMENT (OUTPATIENT)
Dept: CT IMAGING | Age: 63
End: 2023-05-30
Attending: EMERGENCY MEDICINE
Payer: COMMERCIAL

## 2023-05-30 VITALS
RESPIRATION RATE: 18 BRPM | BODY MASS INDEX: 30.1 KG/M2 | HEART RATE: 79 BPM | WEIGHT: 215 LBS | SYSTOLIC BLOOD PRESSURE: 136 MMHG | HEIGHT: 71 IN | OXYGEN SATURATION: 97 % | DIASTOLIC BLOOD PRESSURE: 91 MMHG | TEMPERATURE: 97 F

## 2023-05-30 DIAGNOSIS — S09.90XA INJURY OF HEAD, INITIAL ENCOUNTER: ICD-10-CM

## 2023-05-30 DIAGNOSIS — S30.0XXA LUMBAR CONTUSION, INITIAL ENCOUNTER: Primary | ICD-10-CM

## 2023-05-30 PROCEDURE — 72110 X-RAY EXAM L-2 SPINE 4/>VWS: CPT | Performed by: EMERGENCY MEDICINE

## 2023-05-30 PROCEDURE — 70450 CT HEAD/BRAIN W/O DYE: CPT | Performed by: EMERGENCY MEDICINE

## 2023-05-30 PROCEDURE — 99284 EMERGENCY DEPT VISIT MOD MDM: CPT

## 2023-05-30 NOTE — ED INITIAL ASSESSMENT (HPI)
57 y/o M arrives to ED with c/o fall off of a ladder approx. 3 weeks ago. Patient reports he was 4-5 feet off of the ground when he fell. Patient reports unknown LOC; wife said he had brief LOC. Patient reports he was bleeding from the back of his head. Patient reports EMS was called and he signed a refusal but they applied a dressing to his head to control bleeding. Patient reports headache for the 4-5 days afterwards and now has pressure across his forehead and in the back of his head surrounding the area where the laceration was.

## 2023-05-30 NOTE — TELEPHONE ENCOUNTER
From: Marcelina Joyce  To: Stefano Ghotra MD  Sent: 5/30/2023 3:44 PM CDT  Subject: head injury    about 3 weeks ago i fell off a letter and split my head open and injured my tail bone. My head hurt for a couple of days, then got better. Now it really hurts and i have an indentation in my skull. As for the tailbone it is still really tender and i am having shooting paints from my behind down to my knee. You think i should go to the emergency room or wait to schedule a appointment?     Thank you    Nicole Roper

## 2023-07-18 ENCOUNTER — PATIENT MESSAGE (OUTPATIENT)
Dept: FAMILY MEDICINE CLINIC | Facility: CLINIC | Age: 63
End: 2023-07-18

## 2023-07-18 RX ORDER — LISINOPRIL 20 MG/1
20 TABLET ORAL DAILY
Qty: 90 TABLET | Refills: 1 | Status: SHIPPED | OUTPATIENT
Start: 2023-07-18

## 2023-07-18 NOTE — TELEPHONE ENCOUNTER
Medication(s) to Refill:   Requested Prescriptions     Pending Prescriptions Disp Refills    LISINOPRIL 20 MG Oral Tab [Pharmacy Med Name: LISINOPRIL 20MG TABLETS] 90 tablet 1     Sig: TAKE 1 TABLET(20 MG) BY MOUTH DAILY         Reason for Medication Refill being sent to Provider / Reason Protocol Failed:  [] 90 day refill has already been granted  [] Blood Pressure out of range  [] Labs Abnormal/over due  [] Medication not previously prescribed by Provider  [] Non-Protocol Medication  [] Controlled Substance   [] Due for appointment- no future appointment scheduled  [] No Follow up specified      Last Time Medication was Filled:  5/4/2023      Last Office Visit with PCP: 6/22/2022    When Patient was Due Back to the Office:  not on file   (from when PCP last addressed condition)    Future Appointments:  No future appointments.       Last Blood Pressures:  BP Readings from Last 2 Encounters:  05/30/23 : (!) 136/91  03/29/23 : 110/79      Action taken:  [] Refill approved per protocol  [x] Routing to provider for approval

## 2023-07-18 NOTE — TELEPHONE ENCOUNTER
From: Amber Dao  To: Fanta Villela MD  Sent: 7/18/2023 3:00 PM CDT  Subject: my mother Sohan Headley has a problem with her right eye    Her right eye is totally red and has been for 3 days now. ..should we  be concerned?     Do you think she should see a eye specialist?   if so, can you send her a referral?    Thank Vonda Vieira

## 2023-08-23 DIAGNOSIS — R07.9 CHEST PAIN, UNSPECIFIED TYPE: ICD-10-CM

## 2023-08-23 DIAGNOSIS — I10 HYPERTENSION, UNSPECIFIED TYPE: Primary | ICD-10-CM

## 2023-09-02 NOTE — ED PROVIDER NOTES
Patient Seen in: THE [facility] Emergency Department In [place]      History   Patient presents with:  Dizziness    Stated Complaint: dizziness, anival    HPI/Subjective:   HPI    Woke up this morning and then felt dizzy- seems like he could not keep his balance Left Ear: External ear normal.      Nose: Nose normal.   Eyes:      General: No scleral icterus. Right eye: No discharge. Left eye: No discharge.       Conjunctiva/sclera: Conjunctivae normal.      Pupils: Pupils are equal, round, and reactiv 3.4 (*)     All other components within normal limits   TROPONIN I - Normal   CBC WITH DIFFERENTIAL WITH PLATELET    Narrative: The following orders were created for panel order CBC WITH DIFFERENTIAL WITH PLATELET.   Procedure pattern     identified.                               =====    CONCLUSION:  No acute process              Dictated by (CST): Kamila Aj MD on 5/09/2021 at 5:20 PM         Finalized by (CST): Kamila Aj MD on 5/09/2021 at 5:21 PM        XR CHEST A benign with a normal MRI. Symptoms have improved here in the emergency department the blood pressure still somewhat elevated. He states the neck pain is fairly common with hisElevated blood pressure.   He is going to continue working with his primary care <-- Click to add NO pertinent Family History

## 2023-09-12 ENCOUNTER — TELEPHONE (OUTPATIENT)
Dept: FAMILY MEDICINE CLINIC | Facility: CLINIC | Age: 63
End: 2023-09-12

## 2023-09-15 ENCOUNTER — MED REC SCAN ONLY (OUTPATIENT)
Dept: FAMILY MEDICINE CLINIC | Facility: CLINIC | Age: 63
End: 2023-09-15

## 2023-10-31 ENCOUNTER — PATIENT MESSAGE (OUTPATIENT)
Dept: FAMILY MEDICINE CLINIC | Facility: CLINIC | Age: 63
End: 2023-10-31

## 2023-10-31 DIAGNOSIS — K76.0 FATTY LIVER: Primary | ICD-10-CM

## 2023-11-01 NOTE — TELEPHONE ENCOUNTER
Per abdominal US from 8/3/2022:   Martir Keller MD  8/3/2022 12:51 PM CDT       Weight loss, limited Tylenol, alcohol and recheck Liver US in 4-5 months.

## 2023-11-01 NOTE — TELEPHONE ENCOUNTER
From: Makenna Lyons  To: Remy Martinez  Sent: 10/31/2023 5:16 PM CDT  Subject: schedule a follow up test    Some time ago, dr Jessy López suggested i have another test done on my liver, because there was a small lesion which can be nothing or may into something more serious. I forgot all about the test which should have been done months ago . ..can yo help scheduling the test?  Also, I am overdue for my annual checkup, concerned about low vitamin D I am dealing with

## 2023-12-12 ENCOUNTER — LAB ENCOUNTER (OUTPATIENT)
Dept: LAB | Age: 63
End: 2023-12-12
Attending: FAMILY MEDICINE
Payer: COMMERCIAL

## 2023-12-12 ENCOUNTER — OFFICE VISIT (OUTPATIENT)
Dept: FAMILY MEDICINE CLINIC | Facility: CLINIC | Age: 63
End: 2023-12-12
Payer: COMMERCIAL

## 2023-12-12 VITALS — DIASTOLIC BLOOD PRESSURE: 82 MMHG | SYSTOLIC BLOOD PRESSURE: 120 MMHG

## 2023-12-12 DIAGNOSIS — Z00.00 LABORATORY EXAMINATION ORDERED AS PART OF A ROUTINE GENERAL MEDICAL EXAMINATION: ICD-10-CM

## 2023-12-12 DIAGNOSIS — Z12.5 SCREENING FOR PROSTATE CANCER: ICD-10-CM

## 2023-12-12 DIAGNOSIS — Z00.00 ROUTINE GENERAL MEDICAL EXAMINATION AT A HEALTH CARE FACILITY: Primary | ICD-10-CM

## 2023-12-12 LAB
ALBUMIN SERPL-MCNC: 4 G/DL (ref 3.4–5)
ALBUMIN/GLOB SERPL: 1.1 {RATIO} (ref 1–2)
ALP LIVER SERPL-CCNC: 80 U/L
ALT SERPL-CCNC: 43 U/L
ANION GAP SERPL CALC-SCNC: 8 MMOL/L (ref 0–18)
AST SERPL-CCNC: 33 U/L (ref 15–37)
BASOPHILS # BLD AUTO: 0.03 X10(3) UL (ref 0–0.2)
BASOPHILS NFR BLD AUTO: 0.6 %
BILIRUB SERPL-MCNC: 0.7 MG/DL (ref 0.1–2)
BUN BLD-MCNC: 16 MG/DL (ref 9–23)
CALCIUM BLD-MCNC: 9.2 MG/DL (ref 8.5–10.1)
CHLORIDE SERPL-SCNC: 107 MMOL/L (ref 98–112)
CHOLEST SERPL-MCNC: 222 MG/DL (ref ?–200)
CO2 SERPL-SCNC: 22 MMOL/L (ref 21–32)
COMPLEXED PSA SERPL-MCNC: 1.19 NG/ML (ref ?–4)
CREAT BLD-MCNC: 1.19 MG/DL
EGFRCR SERPLBLD CKD-EPI 2021: 69 ML/MIN/1.73M2 (ref 60–?)
EOSINOPHIL # BLD AUTO: 0.16 X10(3) UL (ref 0–0.7)
EOSINOPHIL NFR BLD AUTO: 3.1 %
ERYTHROCYTE [DISTWIDTH] IN BLOOD BY AUTOMATED COUNT: 12.4 %
FASTING PATIENT LIPID ANSWER: NO
FASTING STATUS PATIENT QL REPORTED: NO
GLOBULIN PLAS-MCNC: 3.6 G/DL (ref 2.8–4.4)
GLUCOSE BLD-MCNC: 113 MG/DL (ref 70–99)
HCT VFR BLD AUTO: 47.9 %
HDLC SERPL-MCNC: 57 MG/DL (ref 40–59)
HGB BLD-MCNC: 17.1 G/DL
IMM GRANULOCYTES # BLD AUTO: 0.01 X10(3) UL (ref 0–1)
IMM GRANULOCYTES NFR BLD: 0.2 %
LDLC SERPL CALC-MCNC: 141 MG/DL (ref ?–100)
LYMPHOCYTES # BLD AUTO: 1.06 X10(3) UL (ref 1–4)
LYMPHOCYTES NFR BLD AUTO: 20.4 %
MCH RBC QN AUTO: 34.1 PG (ref 26–34)
MCHC RBC AUTO-ENTMCNC: 35.7 G/DL (ref 31–37)
MCV RBC AUTO: 95.4 FL
MONOCYTES # BLD AUTO: 0.49 X10(3) UL (ref 0.1–1)
MONOCYTES NFR BLD AUTO: 9.4 %
NEUTROPHILS # BLD AUTO: 3.45 X10 (3) UL (ref 1.5–7.7)
NEUTROPHILS # BLD AUTO: 3.45 X10(3) UL (ref 1.5–7.7)
NEUTROPHILS NFR BLD AUTO: 66.3 %
NONHDLC SERPL-MCNC: 165 MG/DL (ref ?–130)
OSMOLALITY SERPL CALC.SUM OF ELEC: 286 MOSM/KG (ref 275–295)
PLATELET # BLD AUTO: 273 10(3)UL (ref 150–450)
POTASSIUM SERPL-SCNC: 4.1 MMOL/L (ref 3.5–5.1)
PROT SERPL-MCNC: 7.6 G/DL (ref 6.4–8.2)
RBC # BLD AUTO: 5.02 X10(6)UL
SODIUM SERPL-SCNC: 137 MMOL/L (ref 136–145)
TRIGL SERPL-MCNC: 135 MG/DL (ref 30–149)
TSI SER-ACNC: 0.72 MIU/ML (ref 0.36–3.74)
VIT D+METAB SERPL-MCNC: 10.4 NG/ML (ref 30–100)
VLDLC SERPL CALC-MCNC: 25 MG/DL (ref 0–30)
WBC # BLD AUTO: 5.2 X10(3) UL (ref 4–11)

## 2023-12-12 PROCEDURE — 99396 PREV VISIT EST AGE 40-64: CPT | Performed by: FAMILY MEDICINE

## 2023-12-12 PROCEDURE — 82306 VITAMIN D 25 HYDROXY: CPT | Performed by: FAMILY MEDICINE

## 2023-12-12 PROCEDURE — 84153 ASSAY OF PSA TOTAL: CPT | Performed by: FAMILY MEDICINE

## 2023-12-12 PROCEDURE — 80061 LIPID PANEL: CPT | Performed by: FAMILY MEDICINE

## 2023-12-12 PROCEDURE — 3074F SYST BP LT 130 MM HG: CPT | Performed by: FAMILY MEDICINE

## 2023-12-12 PROCEDURE — 3079F DIAST BP 80-89 MM HG: CPT | Performed by: FAMILY MEDICINE

## 2023-12-12 PROCEDURE — 80050 GENERAL HEALTH PANEL: CPT | Performed by: FAMILY MEDICINE

## 2023-12-12 NOTE — PROGRESS NOTES
Jcarlos Sarmiento is a 61year old male who presents for a complete physical exam.   HPI:        Chief Complaint   Patient presents with    Physical       Patient is present for complete physical. Feels very well. Up to date with dental visits. No hearing problems. Wt Readings from Last 3 Encounters:   23 215 lb (97.5 kg)   23 260 lb (117.9 kg)   22 225 lb (102.1 kg)      BP Readings from Last 3 Encounters:   23 120/82   23 (!) 136/91   23 110/79       Cholesterol, Total (mg/dL)   Date Value   2023 224 (H)   10/19/2017 200 (H)     HDL Cholesterol (mg/dL)   Date Value   2023 51   10/19/2017 43 (L)     LDL Cholesterol (mg/dL)   Date Value   2023 142 (H)   10/19/2017 119     AST (U/L)   Date Value   2023 24   2023 26   2022 29     ALT (U/L)   Date Value   2023 43   2023 44   2022 38      Current Outpatient Medications   Medication Sig Dispense Refill    lisinopril 20 MG Oral Tab Take 1 tablet (20 mg total) by mouth daily. 90 tablet 1    ALPRAZolam 0.5 MG Oral Tab BID prn. 60 tablet 5      Past Medical History:   Diagnosis Date    Anxiety     Essential hypertension     High blood pressure       Past Surgical History:   Procedure Laterality Date    REPAIR ING HERNIA,5+Y/O,REDUCIBL        Family History   Problem Relation Age of Onset    Other (Other) Father     Other (lung cancer) Father     Heart Disorder Mother     Other (Other) Mother     Other (osteoporosis) Mother       Social History     Socioeconomic History    Marital status:    Tobacco Use    Smoking status: Former     Packs/day: 2.00     Years: 8.00     Additional pack years: 0.00     Total pack years: 16.00     Types: Cigarettes     Quit date: 1986     Years since quittin.9    Smokeless tobacco: Never   Vaping Use    Vaping Use: Never used   Substance and Sexual Activity    Alcohol use:  Yes     Alcohol/week: 10.0 - 12.0 standard drinks of alcohol Types: 10 - 12 Standard drinks or equivalent per week    Drug use: No   Other Topics Concern    Caffeine Concern No    Exercise No    Seat Belt Yes     Exercise: occasional.  Diet: watches somewhat     REVIEW OF SYSTEMS:   GENERAL HEALTH: feels well, no fatigue. SKIN: denies any unusual skin lesions or rashes  EYES: no visual complaints or deficits  HEENT: denies nasal congestion, sinus pain or sore throat; hearing loss negative. RESPIRATORY: denies shortness of breath, wheezing or cough   CARDIOVASCULAR: denies chest pain, SOB on exertion,no orthopnea, no edema, no palpitations   GI: denies nausea, vomiting, constipation, diarrhea; no rectal bleeding; no heartburn  GENITAL/: no dysuria, urgency or frequency; no epididymal or testicular pain; no penile discharge; no hernias; no erectile dysfunction; no nocturia  MUSCULOSKELETAL: no joint complaints upper or lower extremities  NEURO: no sensory or motor complaint  PSYCH: no symptoms of depression or anxiety, no insomnia. HEMATOLOGY: denies hx anemia; denies bruising or excessive bleeding  ENDOCRINE: denies excessive thirst or urination; denies unexpected wt gain or wt loss  ALLERGY/IMM.: denies food or seasonal allergies  PSYCH: no anxiety, depression, mood issues. EXAM:   /82     General: WD/WN in no acute distress. HEENT: PERRLA and EOMI. OP moist no lesions. TM normal, canals normal.  NECK: is supple,thyroid- normal. No carotid bruits. Heart: is RRR. S1, S2, with no murmurs. Lungs: are clear to auscultation bilaterally, with no wheeze, rhonchi, or rales. Heart: is RRR. S1, S2, with no murmurs. Abdomen: is soft, NT/ND with no HSM. No rebound or guarding, NABS. Extremities: are symmetric with no cyanosis, clubbing, or edema. Skin: is unremarkable without rashes. Neuro: no focal abnormalities, and reflexes coordination and gait normal and symmetric.    MUSK/SKEL: Normal muscles tones, no joint abnormalities, full ROM of all extremities. back- normal curves, no scoliosis, normal gait,  No joint or mulses abnormalities. Psych: pt is alert, oriented x 3, normal affect. ASSESSMENT AND PLAN:     Tadeo Melo is a 61year old male who presents for a complete physical exam.   Pt's weight is There is no height or weight on file to calculate BMI., recommended low fat diet and aerobic exercise 30 minutes three times weekly. Screening for endocrine, nutritional, metabolic and immunity disorder  -     COMP METABOLIC PANEL (14); Future  -     CBC WITH DIFFERENTIAL WITH PLATELET; Future  -     LIPID PANEL; Future  -     ASSAY, THYROID STIM HORMONE; Future  -     VITAMIN D; Future  . Abnormal liver CT  -     US ABDOMEN LIMITED (CPT=76705); Future    Screening for prostate cancer  -     PSA SCREEN; Future      The patient indicates understanding of these issues and agrees to the plan. The patient is asked to return for CPX in 1 year. normal (ped)...

## 2023-12-12 NOTE — PATIENT INSTRUCTIONS
[facility]     [address] [facility].    Ethan Tripp   Ph: [phone]   Fax: [phone]

## 2023-12-14 RX ORDER — ERGOCALCIFEROL 1.25 MG/1
50000 CAPSULE ORAL WEEKLY
Qty: 16 CAPSULE | Refills: 0 | Status: SHIPPED | OUTPATIENT
Start: 2023-12-14

## 2024-01-08 RX ORDER — LISINOPRIL 20 MG/1
20 TABLET ORAL DAILY
Qty: 90 TABLET | Refills: 1 | Status: SHIPPED | OUTPATIENT
Start: 2024-01-08

## 2024-01-31 ENCOUNTER — PATIENT MESSAGE (OUTPATIENT)
Dept: FAMILY MEDICINE CLINIC | Facility: CLINIC | Age: 64
End: 2024-01-31

## 2024-01-31 NOTE — TELEPHONE ENCOUNTER
From: Raffaele Patton  To: Jes Floyd  Sent: 1/31/2024 3:53 PM CST  Subject: lipid diet    I am supposed to follow a lipid diet. Is this something I can l google? I have no idea what that is, or is there some protocol to follow.    also, I was supposed to make an appointmnet with my cardioligist Dr granados? Have no info for him...would like to see him as my blood pressure surges at times for no reason and my teeth ache when that happens. Even where there are missing teeth...it feels like an aching tooth ache. Am a bit concerned.    Raffaele

## 2024-03-03 DIAGNOSIS — E55.9 VITAMIN D DEFICIENCY: Primary | ICD-10-CM

## 2024-03-04 RX ORDER — ERGOCALCIFEROL 1.25 MG/1
50000 CAPSULE ORAL WEEKLY
Qty: 16 CAPSULE | Refills: 0 | Status: SHIPPED | OUTPATIENT
Start: 2024-03-04

## 2024-03-04 NOTE — TELEPHONE ENCOUNTER
LF 2/26/2024  LOV 12/12/2023    Do you want pt to cont w vitamin d therapy? Please advise, thank you.

## 2024-03-22 ENCOUNTER — TELEPHONE (OUTPATIENT)
Dept: FAMILY MEDICINE CLINIC | Facility: CLINIC | Age: 64
End: 2024-03-22

## 2024-03-22 RX ORDER — CIPROFLOXACIN 500 MG/1
500 TABLET, FILM COATED ORAL 2 TIMES DAILY
Qty: 14 TABLET | Refills: 0 | Status: SHIPPED | OUTPATIENT
Start: 2024-03-22 | End: 2024-03-29

## 2024-05-01 ENCOUNTER — TELEPHONE (OUTPATIENT)
Dept: FAMILY MEDICINE CLINIC | Facility: CLINIC | Age: 64
End: 2024-05-01

## 2024-05-01 NOTE — TELEPHONE ENCOUNTER
----- Message from Jes Floyd MD sent at 5/1/2024 12:22 PM CDT -----  Nicki, ask pt who is his cardiologist, I can not find his Echo, labs.    He asked me for second opinion on it.    Thanks

## 2024-05-30 ENCOUNTER — LAB ENCOUNTER (OUTPATIENT)
Dept: LAB | Age: 64
End: 2024-05-30
Attending: INTERNAL MEDICINE
Payer: COMMERCIAL

## 2024-05-30 DIAGNOSIS — E78.01 FAMILIAL HYPERCHOLESTEROLEMIA: ICD-10-CM

## 2024-05-30 DIAGNOSIS — R93.1 AGATSTON CORONARY ARTERY CALCIUM SCORE BETWEEN 100 AND 199: Primary | ICD-10-CM

## 2024-05-30 DIAGNOSIS — I25.10 CAD (CORONARY ARTERY DISEASE): ICD-10-CM

## 2024-05-30 DIAGNOSIS — R53.83 FATIGUE: ICD-10-CM

## 2024-05-30 DIAGNOSIS — R73.9 CHRONIC HYPERGLYCEMIA: ICD-10-CM

## 2024-05-30 LAB
ALBUMIN SERPL-MCNC: 4 G/DL (ref 3.4–5)
ALBUMIN/GLOB SERPL: 1.1 {RATIO} (ref 1–2)
ALP LIVER SERPL-CCNC: 71 U/L
ALT SERPL-CCNC: 25 U/L
ANION GAP SERPL CALC-SCNC: 7 MMOL/L (ref 0–18)
AST SERPL-CCNC: 20 U/L (ref 15–37)
BASOPHILS # BLD AUTO: 0.02 X10(3) UL (ref 0–0.2)
BASOPHILS NFR BLD AUTO: 0.4 %
BILIRUB SERPL-MCNC: 0.8 MG/DL (ref 0.1–2)
BUN BLD-MCNC: 12 MG/DL (ref 9–23)
CALCIUM BLD-MCNC: 8.8 MG/DL (ref 8.5–10.1)
CHLORIDE SERPL-SCNC: 106 MMOL/L (ref 98–112)
CHOLEST SERPL-MCNC: 207 MG/DL (ref ?–200)
CO2 SERPL-SCNC: 23 MMOL/L (ref 21–32)
CREAT BLD-MCNC: 0.94 MG/DL
CRP SERPL-MCNC: <0.29 MG/DL (ref ?–0.3)
EGFRCR SERPLBLD CKD-EPI 2021: 91 ML/MIN/1.73M2 (ref 60–?)
EOSINOPHIL # BLD AUTO: 0.14 X10(3) UL (ref 0–0.7)
EOSINOPHIL NFR BLD AUTO: 3.1 %
ERYTHROCYTE [DISTWIDTH] IN BLOOD BY AUTOMATED COUNT: 13.5 %
EST. AVERAGE GLUCOSE BLD GHB EST-MCNC: 91 MG/DL (ref 68–126)
FASTING PATIENT LIPID ANSWER: YES
FASTING STATUS PATIENT QL REPORTED: YES
GLOBULIN PLAS-MCNC: 3.5 G/DL (ref 2.8–4.4)
GLUCOSE BLD-MCNC: 103 MG/DL (ref 70–99)
HBA1C MFR BLD: 4.8 % (ref ?–5.7)
HCT VFR BLD AUTO: 49.9 %
HDLC SERPL-MCNC: 65 MG/DL (ref 40–59)
HGB BLD-MCNC: 17.3 G/DL
IMM GRANULOCYTES # BLD AUTO: 0.01 X10(3) UL (ref 0–1)
IMM GRANULOCYTES NFR BLD: 0.2 %
LDLC SERPL CALC-MCNC: 129 MG/DL (ref ?–100)
LYMPHOCYTES # BLD AUTO: 1.06 X10(3) UL (ref 1–4)
LYMPHOCYTES NFR BLD AUTO: 23.2 %
MCH RBC QN AUTO: 33.8 PG (ref 26–34)
MCHC RBC AUTO-ENTMCNC: 34.7 G/DL (ref 31–37)
MCV RBC AUTO: 97.5 FL
MONOCYTES # BLD AUTO: 0.42 X10(3) UL (ref 0.1–1)
MONOCYTES NFR BLD AUTO: 9.2 %
NEUTROPHILS # BLD AUTO: 2.91 X10 (3) UL (ref 1.5–7.7)
NEUTROPHILS # BLD AUTO: 2.91 X10(3) UL (ref 1.5–7.7)
NEUTROPHILS NFR BLD AUTO: 63.9 %
NONHDLC SERPL-MCNC: 142 MG/DL (ref ?–130)
OSMOLALITY SERPL CALC.SUM OF ELEC: 282 MOSM/KG (ref 275–295)
PLATELET # BLD AUTO: 232 10(3)UL (ref 150–450)
POTASSIUM SERPL-SCNC: 4.1 MMOL/L (ref 3.5–5.1)
PROT SERPL-MCNC: 7.5 G/DL (ref 6.4–8.2)
RBC # BLD AUTO: 5.12 X10(6)UL
SODIUM SERPL-SCNC: 136 MMOL/L (ref 136–145)
TRIGL SERPL-MCNC: 74 MG/DL (ref 30–149)
VIT D+METAB SERPL-MCNC: 43.4 NG/ML (ref 30–100)
VLDLC SERPL CALC-MCNC: 13 MG/DL (ref 0–30)
WBC # BLD AUTO: 4.6 X10(3) UL (ref 4–11)

## 2024-05-30 PROCEDURE — 36415 COLL VENOUS BLD VENIPUNCTURE: CPT

## 2024-05-30 PROCEDURE — 85025 COMPLETE CBC W/AUTO DIFF WBC: CPT

## 2024-05-30 PROCEDURE — 86140 C-REACTIVE PROTEIN: CPT

## 2024-05-30 PROCEDURE — 82306 VITAMIN D 25 HYDROXY: CPT

## 2024-05-30 PROCEDURE — 83036 HEMOGLOBIN GLYCOSYLATED A1C: CPT

## 2024-05-30 PROCEDURE — 80053 COMPREHEN METABOLIC PANEL: CPT

## 2024-05-30 PROCEDURE — 80061 LIPID PANEL: CPT

## 2024-07-10 RX ORDER — BUPROPION HYDROCHLORIDE 150 MG/1
TABLET ORAL
Qty: 45 TABLET | Refills: 0 | OUTPATIENT
Start: 2024-07-10

## 2024-07-10 NOTE — TELEPHONE ENCOUNTER
LOV- 12/12/2023     Called patient to confirm directions, patient states he is no longer taking this medication. Pharmacy notified

## 2024-07-16 ENCOUNTER — OFFICE VISIT (OUTPATIENT)
Dept: FAMILY MEDICINE CLINIC | Facility: CLINIC | Age: 64
End: 2024-07-16
Payer: COMMERCIAL

## 2024-07-16 VITALS
OXYGEN SATURATION: 99 % | HEART RATE: 81 BPM | SYSTOLIC BLOOD PRESSURE: 130 MMHG | RESPIRATION RATE: 18 BRPM | TEMPERATURE: 98 F | DIASTOLIC BLOOD PRESSURE: 80 MMHG | WEIGHT: 212 LBS | BODY MASS INDEX: 29.68 KG/M2 | HEIGHT: 71 IN
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
## 2024-07-16 PROCEDURE — 3008F BODY MASS INDEX DOCD: CPT | Performed by: NURSE PRACTITIONER

## 2024-07-16 PROCEDURE — 99213 OFFICE O/P EST LOW 20 MIN: CPT | Performed by: NURSE PRACTITIONER

## 2024-07-16 PROCEDURE — 3075F SYST BP GE 130 - 139MM HG: CPT | Performed by: NURSE PRACTITIONER

## 2024-07-16 PROCEDURE — 3079F DIAST BP 80-89 MM HG: CPT | Performed by: NURSE PRACTITIONER

## 2024-07-16 NOTE — PROGRESS NOTES
Chief Complaint   Patient presents with    Rash     ? Bites the past few days, + itching     HPI:     Raffaele Patton is a 63 year old male who presents today with a chief complaint of insect bite to torso and neck   Onset:  1 days.    Location of bite and affected area:  few bites to torso and few to neck .     Incident witnessed: No, but has been outside a lot  Patient denies  facial swelling, lip swelling, tongue swelling, throat swelling, nausea, chest tightness, wheezing, dyspnea, sore throat, dizziness, lightheadedness  H/o previous bite/stings: Yes  Hives:  No  Anaphylaxis: No  Medications taken with this episode: none    Current Outpatient Medications   Medication Sig Dispense Refill    ALPRAZolam 0.5 MG Oral Tab TAKE 1 TABLET BY MOUTH TWICE DAILY AS NEEDED 60 tablet 5    ERGOCALCIFEROL 1.25 MG (29541 UT) Oral Cap TAKE 1 CAPSULE BY MOUTH ONCE WEEKLY 16 capsule 0    lisinopril 20 MG Oral Tab Take 1 tablet (20 mg total) by mouth daily. 90 tablet 1     Past Medical History:    Anxiety    Essential hypertension    High blood pressure       ROS:   Constitutional: negative  Eyes: negative  Ears, nose, mouth, throat, and face: negative  Respiratory: negative  Cardiovascular: negative  Gastrointestinal: negative  Genitourinary:negative  Integument/breast: positive for bite, swelling, itchy and pain in areas of bites  Musculoskeletal:negative  Neurological: negative    Physical Exam:    /80   Pulse 81   Temp 97.5 °F (36.4 °C) (Other)   Resp 18   Ht 5' 11\" (1.803 m)   Wt 212 lb (96.2 kg)   SpO2 99%   BMI 29.57 kg/m²   General appearance: alert, appears stated age and cooperative  Head: Normocephalic, without obvious abnormality, atraumatic  Eyes: conjunctivae/corneas clear. PERRL, EOM's intact. Fundi benign.  Ears: normal TM's and external ear canals both ears  Nose: Nares normal. Septum midline. Mucosa normal. No drainage or sinus tenderness.  Throat: lips, mucosa, and tongue normal; teeth and gums  normal and no lip, tongue or throat swelling noted  Neck: no adenopathy and supple, symmetrical, trachea midline  Back: no tenderness to percussion or palpation  Lungs: clear to auscultation bilaterally  Heart: S1, S2 normal, no murmur, click, rub or gallop, regular rate and rhythm  Abdomen: soft, non-tender; bowel sounds normal; no masses,  no organomegaly  Extremities: extremities normal, atraumatic, no cyanosis or edema  Pulses: 2+ and symmetric  Skin:  No rash noted.  Papular, indurated, pustular lesions noted to torso approx 3-4 and 2-3 to neck.  Consistent with insect or spider bite.          Assessment/Plan:     Diagnosis:    ICD-10-CM    1. Insect bite, unspecified site, initial encounter  W57.XXXA           Medications for this encounter:  Outpatient Encounter Medications as of 7/16/2024   Medication Sig Dispense Refill    ALPRAZolam 0.5 MG Oral Tab TAKE 1 TABLET BY MOUTH TWICE DAILY AS NEEDED 60 tablet 5    ERGOCALCIFEROL 1.25 MG (88100 UT) Oral Cap TAKE 1 CAPSULE BY MOUTH ONCE WEEKLY 16 capsule 0    lisinopril 20 MG Oral Tab Take 1 tablet (20 mg total) by mouth daily. 90 tablet 1     No facility-administered encounter medications on file as of 7/16/2024.       No orders of the defined types were placed in this encounter.          Plan:  In clinic treatment: none  Discharge medications: Cortisone cream, Calamine lotion.  OTC H1 and H2 blockers recommended.   Monitor for lip, tongue and throat swelling. To ED with any of these.  Monitor for secondary infection of bite/sting site  All results reviewed and discussed with patient.  See AVS for detailed discharge instructions for your condition today.    Follow Up with:  No follow-up provider specified.

## 2024-07-17 ENCOUNTER — HOSPITAL ENCOUNTER (EMERGENCY)
Age: 64
Discharge: HOME | End: 2024-07-17
Attending: EMERGENCY MEDICINE
Payer: COMMERCIAL

## 2024-07-17 VITALS
HEIGHT: 71 IN | SYSTOLIC BLOOD PRESSURE: 140 MMHG | RESPIRATION RATE: 16 BRPM | OXYGEN SATURATION: 98 % | DIASTOLIC BLOOD PRESSURE: 95 MMHG | HEART RATE: 64 BPM | WEIGHT: 212 LBS | TEMPERATURE: 98 F | BODY MASS INDEX: 29.68 KG/M2

## 2024-07-17 DIAGNOSIS — L25.9 CONTACT DERMATITIS, UNSPECIFIED CONTACT DERMATITIS TYPE, UNSPECIFIED TRIGGER: Primary | ICD-10-CM

## 2024-07-17 PROCEDURE — 99283 EMERGENCY DEPT VISIT LOW MDM: CPT

## 2024-07-17 RX ORDER — PREDNISONE 20 MG/1
60 TABLET ORAL ONCE
Status: COMPLETED | OUTPATIENT
Start: 2024-07-17 | End: 2024-07-17

## 2024-07-17 RX ORDER — PREDNISONE 20 MG/1
60 TABLET ORAL DAILY
Qty: 15 TABLET | Refills: 0 | Status: SHIPPED | OUTPATIENT
Start: 2024-07-17 | End: 2024-07-22

## 2024-07-17 RX ORDER — TRIAMCINOLONE ACETONIDE 1 MG/G
1 CREAM TOPICAL 2 TIMES DAILY
Qty: 15 G | Refills: 0 | Status: SHIPPED | OUTPATIENT
Start: 2024-07-17

## 2024-07-17 NOTE — ED PROVIDER NOTES
Patient Seen in: [facility] In [place]      History     Chief Complaint   Patient presents with    Rash     Stated Complaint: Rash - benadryl at 0000    Subjective:   HPI    Patient is a 63-year-old male presenting to the ED for evaluation of a rash.  The history is obtained from patient who is a good historian.  Patient states that this rash started 7 to 10 days ago while he was working outside in the garden.  He does note that there is poison ivy in this area however he feels comfortable \"handling it.\"  The rash is itchy, and has been spreading for the last 7 to 10 days.  Initially, he thought it was shingles but now the rash is more generalized, bilateral, located mostly on the trunk, as well as neck area.  No associated pain or fever.  The patient has taken Benadryl 25 mg 1 hour prior to arrival for itching with minimal improvement.    Objective:   Past Medical History:    Anxiety    Essential hypertension    High blood pressure              Past Surgical History:   Procedure Laterality Date    Repair ing hernia,5+y/o,reducibl                  Social History     Socioeconomic History    Marital status:    Tobacco Use    Smoking status: Former     Current packs/day: 0.00     Average packs/day: 2.0 packs/day for 8.0 years (16.0 ttl pk-yrs)     Types: Cigarettes     Start date: 1978     Quit date: 1986     Years since quittin.5    Smokeless tobacco: Never   Vaping Use    Vaping status: Never Used   Substance and Sexual Activity    Alcohol use: Yes     Alcohol/week: 10.0 - 12.0 standard drinks of alcohol     Types: 10 - 12 Standard drinks or equivalent per week    Drug use: No   Other Topics Concern    Caffeine Concern No    Exercise No    Seat Belt Yes              Review of Systems    Positive for stated Chief Complaint: Rash    Other systems are as noted in HPI.  Constitutional and vital signs reviewed.      All other systems reviewed and negative except as noted  above.    Physical Exam     ED Triage Vitals [07/17/24 0021]   BP (!) 140/95   Pulse 64   Resp 16   Temp 97.8 °F (36.6 °C)   Temp src Oral   SpO2 98 %   O2 Device None (Room air)       Current Vitals:   Vital Signs  BP: (!) 140/95  Pulse: 64  Resp: 16  Temp: 97.8 °F (36.6 °C)  Temp src: Oral    Oxygen Therapy  SpO2: 98 %  O2 Device: None (Room air)            Physical Exam  Vitals and nursing note reviewed.   Constitutional:       General: He is not in acute distress.     Appearance: Normal appearance. He is not ill-appearing.   HENT:      Head: Normocephalic and atraumatic.      Right Ear: External ear normal.      Left Ear: External ear normal.      Nose: Nose normal.      Mouth/Throat:      Mouth: Mucous membranes are moist.      Pharynx: Oropharynx is clear. No posterior oropharyngeal erythema.   Eyes:      Conjunctiva/sclera: Conjunctivae normal.   Cardiovascular:      Rate and Rhythm: Normal rate and regular rhythm.   Pulmonary:      Effort: Pulmonary effort is normal. No respiratory distress.      Breath sounds: Normal breath sounds.   Abdominal:      General: Abdomen is flat. Bowel sounds are normal. There is no distension.      Tenderness: There is no abdominal tenderness.   Musculoskeletal:      Right lower leg: No edema.      Left lower leg: No edema.   Skin:     General: Skin is warm.      Capillary Refill: Capillary refill takes less than 2 seconds.      Findings: Rash present.      Comments: Small diffuse areas of erythema, blanching, with some overlying small raised blisters, bilateral, located on the trunk as well as neck.   Neurological:      General: No focal deficit present.      Mental Status: He is alert and oriented to person, place, and time.   Psychiatric:         Mood and Affect: Mood normal.         Behavior: Behavior normal.               ED Course   Labs Reviewed - No data to display                   MDM      History is obtained from patient who is a good historian.  Likely suspect  contact dermatitis which could be from poison ivy given patient's recent work outside in the garden with known exposure.  Therefore, will recommend steroids, topical use of steroid cream as well as calamine lotion.  May use oatmeal baths.  Return to ED if any symptoms worsen, persist, or new symptoms develop.  Patient is comfortable discharge plan.      Previous records were reviewed.  The patient was seen in the office yesterday by nurse practitioner diagnosed with \"insect bite.\"  The patient does not recall being bitten by any insect.  Also, the patient states that this is started 1 location and spread to other locations since onset.  Patient is not given steroids at that time.  He was given hydrocortisone cream, calamine lotion and advised to use OTC H1 and H2 blockers.                                   MDM    Disposition and Plan     Clinical Impression:  1. Contact dermatitis, unspecified contact dermatitis type, unspecified trigger         Disposition:  Discharge  7/17/2024  1:15 am    Follow-up:  Jes Floyd MD  [address]  [facility] [address]  [phone]    Schedule an appointment as soon as possible for a visit in 2 day(s)      [facility] in [place]  [address] [facility] [address]  [phone]  Follow up  IF SYMPTOMS WORSEN, PERSIST, OR NEW SYMPTOMS DEVEL          Medications Prescribed:  Current Discharge Medication List

## 2024-07-17 NOTE — DISCHARGE INSTRUCTIONS
Continue Benadryl over-the-counter as directed as needed for itching.  May also apply topical itch relief medication such as calamine lotion

## 2024-07-17 NOTE — ED INITIAL ASSESSMENT (HPI)
Rash to trunk of body began on right flank 7-10 days ago. Today more areas of raised redness. Sites are itching. Today patient feeling flush. Patient works outside. Benadryl 25 mg 1 hour ago.

## 2024-10-13 NOTE — TELEPHONE ENCOUNTER
Pt is off the floor to CT at this time.   Spoke with him, him and his mother Veronika Pope need Covid Ab test, they were sick like two weeks ago, thanks.

## 2024-10-20 ENCOUNTER — APPOINTMENT (OUTPATIENT)
Dept: CT IMAGING | Facility: HOSPITAL | Age: 64
End: 2024-10-20
Attending: EMERGENCY MEDICINE
Payer: COMMERCIAL

## 2024-10-20 ENCOUNTER — HOSPITAL ENCOUNTER (INPATIENT)
Facility: HOSPITAL | Age: 64
LOS: 2 days | Discharge: HOME | End: 2024-10-22
Attending: EMERGENCY MEDICINE | Admitting: INTERNAL MEDICINE
Payer: COMMERCIAL

## 2024-10-20 ENCOUNTER — APPOINTMENT (OUTPATIENT)
Dept: GENERAL RADIOLOGY | Facility: HOSPITAL | Age: 64
End: 2024-10-20
Attending: EMERGENCY MEDICINE
Payer: COMMERCIAL

## 2024-10-20 DIAGNOSIS — E86.0 DEHYDRATION: ICD-10-CM

## 2024-10-20 DIAGNOSIS — E87.20 METABOLIC ACIDOSIS: ICD-10-CM

## 2024-10-20 DIAGNOSIS — I95.1 ORTHOSTATIC HYPOTENSION: ICD-10-CM

## 2024-10-20 DIAGNOSIS — K92.2 GASTROINTESTINAL HEMORRHAGE, UNSPECIFIED GASTROINTESTINAL HEMORRHAGE TYPE: Primary | ICD-10-CM

## 2024-10-20 LAB
ALBUMIN SERPL-MCNC: 3.9 G/DL (ref 3.2–4.8)
ALBUMIN/GLOB SERPL: 1.5 {RATIO} (ref 1–2)
ALP LIVER SERPL-CCNC: 66 U/L
ALT SERPL-CCNC: 33 U/L
ANION GAP SERPL CALC-SCNC: 4 MMOL/L (ref 0–18)
ANTIBODY SCREEN: NEGATIVE
APTT PPP: 21.7 SECONDS (ref 23–36)
AST SERPL-CCNC: 36 U/L (ref ?–34)
ATRIAL RATE: 133 BPM
BASOPHILS # BLD AUTO: 0.02 X10(3) UL (ref 0–0.2)
BASOPHILS NFR BLD AUTO: 0.2 %
BILIRUB SERPL-MCNC: 0.8 MG/DL (ref 0.2–1.1)
BILIRUB UR QL STRIP.AUTO: NEGATIVE
BUN BLD-MCNC: 36 MG/DL (ref 9–23)
CALCIUM BLD-MCNC: 9.1 MG/DL (ref 8.7–10.4)
CHLORIDE SERPL-SCNC: 111 MMOL/L (ref 98–112)
CLARITY UR REFRACT.AUTO: CLEAR
CO2 SERPL-SCNC: 20 MMOL/L (ref 21–32)
CREAT BLD-MCNC: 1.01 MG/DL
EGFRCR SERPLBLD CKD-EPI 2021: 83 ML/MIN/1.73M2 (ref 60–?)
EOSINOPHIL # BLD AUTO: 0.18 X10(3) UL (ref 0–0.7)
EOSINOPHIL NFR BLD AUTO: 1.7 %
ERYTHROCYTE [DISTWIDTH] IN BLOOD BY AUTOMATED COUNT: 12.1 %
ETHANOL SERPL-MCNC: <3 MG/DL (ref ?–3)
GLOBULIN PLAS-MCNC: 2.6 G/DL (ref 2–3.5)
GLUCOSE BLD-MCNC: 180 MG/DL (ref 70–99)
GLUCOSE UR STRIP.AUTO-MCNC: NORMAL MG/DL
HCT VFR BLD AUTO: 34.9 %
HCT VFR BLD AUTO: 35.2 %
HCT VFR BLD AUTO: 41.8 %
HGB BLD-MCNC: 12 G/DL
HGB BLD-MCNC: 12.1 G/DL
HGB BLD-MCNC: 14.6 G/DL
IMM GRANULOCYTES # BLD AUTO: 0.04 X10(3) UL (ref 0–1)
IMM GRANULOCYTES NFR BLD: 0.4 %
INR BLD: 1.03 (ref 0.8–1.2)
KETONES UR STRIP.AUTO-MCNC: 20 MG/DL
LEUKOCYTE ESTERASE UR QL STRIP.AUTO: NEGATIVE
LIPASE SERPL-CCNC: 34 U/L (ref 12–53)
LYMPHOCYTES # BLD AUTO: 1.64 X10(3) UL (ref 1–4)
LYMPHOCYTES NFR BLD AUTO: 15.2 %
MCH RBC QN AUTO: 34.2 PG (ref 26–34)
MCHC RBC AUTO-ENTMCNC: 34.9 G/DL (ref 31–37)
MCV RBC AUTO: 97.9 FL
MONOCYTES # BLD AUTO: 0.87 X10(3) UL (ref 0.1–1)
MONOCYTES NFR BLD AUTO: 8.1 %
NEUTROPHILS # BLD AUTO: 8.05 X10 (3) UL (ref 1.5–7.7)
NEUTROPHILS # BLD AUTO: 8.05 X10(3) UL (ref 1.5–7.7)
NEUTROPHILS NFR BLD AUTO: 74.4 %
NITRITE UR QL STRIP.AUTO: NEGATIVE
OSMOLALITY SERPL CALC.SUM OF ELEC: 293 MOSM/KG (ref 275–295)
P AXIS: 9 DEGREES
P-R INTERVAL: 160 MS
PH UR STRIP.AUTO: 5.5 [PH] (ref 5–8)
PLATELET # BLD AUTO: 285 10(3)UL (ref 150–450)
POTASSIUM SERPL-SCNC: 5 MMOL/L (ref 3.5–5.1)
PROT SERPL-MCNC: 6.5 G/DL (ref 5.7–8.2)
PROT UR STRIP.AUTO-MCNC: NEGATIVE MG/DL
PROTHROMBIN TIME: 13.6 SECONDS (ref 11.6–14.8)
Q-T INTERVAL: 272 MS
QRS DURATION: 76 MS
QTC CALCULATION (BEZET): 404 MS
R AXIS: -47 DEGREES
RBC # BLD AUTO: 4.27 X10(6)UL
RBC UR QL AUTO: NEGATIVE
RH BLOOD TYPE: POSITIVE
RH BLOOD TYPE: POSITIVE
SODIUM SERPL-SCNC: 135 MMOL/L (ref 136–145)
SP GR UR STRIP.AUTO: >1.03 (ref 1–1.03)
T AXIS: 80 DEGREES
UROBILINOGEN UR STRIP.AUTO-MCNC: NORMAL MG/DL
VENTRICULAR RATE: 133 BPM
WBC # BLD AUTO: 10.8 X10(3) UL (ref 4–11)

## 2024-10-20 PROCEDURE — 99223 1ST HOSP IP/OBS HIGH 75: CPT | Performed by: INTERNAL MEDICINE

## 2024-10-20 PROCEDURE — 71045 X-RAY EXAM CHEST 1 VIEW: CPT | Performed by: EMERGENCY MEDICINE

## 2024-10-20 PROCEDURE — 74177 CT ABD & PELVIS W/CONTRAST: CPT | Performed by: EMERGENCY MEDICINE

## 2024-10-20 RX ORDER — SODIUM CHLORIDE 9 MG/ML
INJECTION, SOLUTION INTRAVENOUS CONTINUOUS
Status: DISCONTINUED | OUTPATIENT
Start: 2024-10-20 | End: 2024-10-21

## 2024-10-20 RX ORDER — SODIUM CHLORIDE 9 MG/ML
INJECTION, SOLUTION INTRAVENOUS CONTINUOUS
Status: ACTIVE | OUTPATIENT
Start: 2024-10-20 | End: 2024-10-20

## 2024-10-20 RX ORDER — DIPHENHYDRAMINE HYDROCHLORIDE 50 MG/ML
50 INJECTION INTRAMUSCULAR; INTRAVENOUS ONCE
Status: COMPLETED | OUTPATIENT
Start: 2024-10-20 | End: 2024-10-20

## 2024-10-20 RX ORDER — SENNOSIDES 8.6 MG
17.2 TABLET ORAL NIGHTLY PRN
Status: DISCONTINUED | OUTPATIENT
Start: 2024-10-20 | End: 2024-10-22

## 2024-10-20 RX ORDER — ACETAMINOPHEN 500 MG
500 TABLET ORAL EVERY 4 HOURS PRN
Status: DISCONTINUED | OUTPATIENT
Start: 2024-10-20 | End: 2024-10-22

## 2024-10-20 RX ORDER — POLYETHYLENE GLYCOL 3350 17 G/17G
17 POWDER, FOR SOLUTION ORAL DAILY PRN
Status: DISCONTINUED | OUTPATIENT
Start: 2024-10-20 | End: 2024-10-22

## 2024-10-20 RX ORDER — METOCLOPRAMIDE HYDROCHLORIDE 5 MG/ML
10 INJECTION INTRAMUSCULAR; INTRAVENOUS ONCE
Status: COMPLETED | OUTPATIENT
Start: 2024-10-20 | End: 2024-10-20

## 2024-10-20 RX ORDER — BISACODYL 10 MG
10 SUPPOSITORY, RECTAL RECTAL
Status: DISCONTINUED | OUTPATIENT
Start: 2024-10-20 | End: 2024-10-22

## 2024-10-20 RX ORDER — ONDANSETRON 2 MG/ML
4 INJECTION INTRAMUSCULAR; INTRAVENOUS EVERY 6 HOURS PRN
Status: DISCONTINUED | OUTPATIENT
Start: 2024-10-20 | End: 2024-10-22

## 2024-10-20 RX ORDER — SODIUM PHOSPHATE, DIBASIC AND SODIUM PHOSPHATE, MONOBASIC 7; 19 G/230ML; G/230ML
1 ENEMA RECTAL ONCE AS NEEDED
Status: DISCONTINUED | OUTPATIENT
Start: 2024-10-20 | End: 2024-10-22

## 2024-10-20 RX ORDER — MELATONIN
3 NIGHTLY PRN
Status: DISCONTINUED | OUTPATIENT
Start: 2024-10-20 | End: 2024-10-22

## 2024-10-20 RX ORDER — PROCHLORPERAZINE EDISYLATE 5 MG/ML
5 INJECTION INTRAMUSCULAR; INTRAVENOUS EVERY 8 HOURS PRN
Status: DISCONTINUED | OUTPATIENT
Start: 2024-10-20 | End: 2024-10-22

## 2024-10-20 NOTE — PLAN OF CARE
Pt A&O. On room air. Tele and  monitoring maintained.remains NPO w/ sips for now. Last BM 10/20/24 . Pt states he had one large loose black BM this morning prior to arrival. Voiding w/o difficulty. Denies pain. Up ad mahin. Tolerating IVFs as ordered. Awaiting GI consult. GI MD notified of new consult per ED. Plan is to monitor Hgb, next draw due at 1400 today. Started on IV protonix. Two  person skin check completed with Cam PCT.     Pt lives at home with his spouse, Loreto.

## 2024-10-20 NOTE — ED PROVIDER NOTES
Patient Seen in: [facility]      History     Chief Complaint   Patient presents with    Fatigue    Nausea/Vomiting/Diarrhea     Stated Complaint: n/v    Subjective:   HPI      64-year-old male presents to the emergency department with a near syncopal episode and diarrhea.  Patient states for the last 3 days he has been having some dark stools.  He states early this morning he felt like he had \"a rockhard abdomen\" and then went to the washroom.  He states he had a copious amount of diarrhea and felt very lightheaded.  He states he made it back to bed and his wife checked his blood pressure and it was 93 over palp.  He states that then they called the ambulance.  He states he had quite a bit of nausea but they gave him nausea medications and now that is resolved.  He is not on any anticoagulants.  No history of GI bleeds.  He states he is on lisinopril but no other medications on a regular basis he states he has Xanax but he very rarely if ever takes it.  He states currently he feels hot but has not had any documented fevers.  He states his appetite was good last night and he ate around 9 PM without any difficulty.  No other acute complaints    Objective:     Past Medical History:    Anxiety    Essential hypertension    High blood pressure              Past Surgical History:   Procedure Laterality Date    Repair ing hernia,5+y/o,reducibl                  Social History     Socioeconomic History    Marital status:    Tobacco Use    Smoking status: Former     Current packs/day: 0.00     Average packs/day: 2.0 packs/day for 8.0 years (16.0 ttl pk-yrs)     Types: Cigarettes     Start date: 1978     Quit date: 1986     Years since quittin.8    Smokeless tobacco: Never   Vaping Use    Vaping status: Never Used   Substance and Sexual Activity    Alcohol use: Yes     Alcohol/week: 10.0 - 12.0 standard drinks of alcohol     Types: 10 - 12 Standard drinks or equivalent per week     Drug use: No   Other Topics Concern    Caffeine Concern No    Exercise No    Seat Belt Yes                  Physical Exam     ED Triage Vitals [10/20/24 0611]   /88   Pulse (!) 135   Resp 19   Temp 97.9 °F (36.6 °C)   Temp src Oral   SpO2 100 %   O2 Device None (Room air)       Current Vitals:   Vital Signs  BP: 96/77  Pulse: 78  Resp: 21  Temp: 97.9 °F (36.6 °C)  Temp src: Oral  MAP (mmHg): 85    Oxygen Therapy  SpO2: 98 %  O2 Device: None (Room air)        Physical Exam  Vitals and nursing note reviewed. Chaperone present: Wife and nurse Mary Ellen.   Constitutional:       General: He is not in acute distress.     Appearance: Normal appearance. He is well-developed.   HENT:      Head: Normocephalic and atraumatic.      Mouth/Throat:      Mouth: Mucous membranes are dry.   Eyes:      Extraocular Movements: Extraocular movements intact.      Pupils: Pupils are equal, round, and reactive to light.   Cardiovascular:      Rate and Rhythm: Regular rhythm. Tachycardia present.      Pulses: Normal pulses.      Heart sounds: Normal heart sounds.   Pulmonary:      Effort: Pulmonary effort is normal.      Breath sounds: Normal breath sounds. No stridor.   Abdominal:      General: Bowel sounds are normal.      Palpations: Abdomen is soft.      Tenderness: There is no guarding or rebound.   Genitourinary:     Rectum: Normal. Guaiac result positive.      Comments: Small nonbleeding external hemorrhoid, minimal dark stool guaiac positive  Musculoskeletal:         General: Normal range of motion.      Cervical back: Normal range of motion and neck supple.   Lymphadenopathy:      Cervical: No cervical adenopathy.   Skin:     General: Skin is warm and dry.   Neurological:      General: No focal deficit present.      Mental Status: He is alert and oriented to person, place, and time.            ED Course     Labs Reviewed   COMP METABOLIC PANEL (14) - Abnormal; Notable for the following components:       Result Value    Glucose  180 (*)     Sodium 135 (*)     CO2 20.0 (*)     BUN 36 (*)     AST 36 (*)     All other components within normal limits   URINALYSIS WITH CULTURE REFLEX - Abnormal; Notable for the following components:    Spec Gravity >1.030 (*)     Ketones Urine 20 (*)     All other components within normal limits   PTT, ACTIVATED - Abnormal; Notable for the following components:    PTT 21.7 (*)     All other components within normal limits   CBC WITH DIFFERENTIAL WITH PLATELET - Abnormal; Notable for the following components:    RBC 4.27 (*)     MCH 34.2 (*)     Neutrophil Absolute Prelim 8.05 (*)     Neutrophil Absolute 8.05 (*)     All other components within normal limits   LIPASE - Normal   ETHYL ALCOHOL - Normal   PROTHROMBIN TIME (PT) - Normal   TYPE AND SCREEN   RAINBOW DRAW LAVENDER   RAINBOW DRAW LIGHT GREEN   RAINBOW DRAW BLUE   RAINBOW DRAW GOLD   C. DIFFICILE(TOXIGENIC)PCR   GI STOOL PANEL BY PCR     EKG    Rate, intervals and axes as noted on EKG Report.  Rate: 133  Rhythm: Sinus Rhythm  Reading: No acute ST segment elevation increased voltage overall                CT ABDOMEN+PELVIS(CONTRAST ONLY)(CPT=74177)    Result Date: 10/20/2024  PROCEDURE:  CT ABDOMEN+PELVIS (CONTRAST ONLY) (CPT=74177)  COMPARISON:  [facility], CT, CT ABDOMEN PELVIS IV CONTRAST, NO ORAL (ER), 6/09/2022, 1:13 AM.  INDICATIONS:  n/v  TECHNIQUE:  CT scanning was performed from the dome of the diaphragm to the pubic symphysis with non-ionic intravenous contrast material. Post contrast coronal MPR imaging was performed.  Dose reduction techniques were used. Dose information is transmitted to the ACR (American College of Radiology) NRDR (National Radiology Data Registry) which includes the Dose Index Registry.  PATIENT STATED HISTORY:(As transcribed by Technologist)  Patient c/o abdominal pain with black stool for the past 4 days. Nausea and vomiting started around 4:00am this mornng.   CONTRAST USED:  100cc of Isovue 370  FINDINGS:  LIVER:  No  enlargement, atrophy, abnormal density, or significant focal lesion.  BILIARY:  No visible dilatation or calcification.  PANCREAS:  No lesion, fluid collection, ductal dilatation, or atrophy.  SPLEEN:  No enlargement or focal lesion.  KIDNEYS:  No mass, obstruction, or calcification.  ADRENALS:  No mass or enlargement.  AORTA/VASCULAR:  No aneurysm or dissection.  RETROPERITONEUM:  No mass or adenopathy.  BOWEL/MESENTERY:  No visible mass, obstruction, or bowel wall thickening.  Appendix is normal. ABDOMINAL WALL:  Small bilateral fat containing inguinal hernias are present URINARY BLADDER:  Mild bladder wall thickening is noted. PELVIC NODES:  No adenopathy.  PELVIC ORGANS:  No visible mass.  Pelvic organs appropriate for patient age.  BONES:  No bony lesion or fracture.  LUNG BASES:  No visible pulmonary or pleural disease.  OTHER:  Negative.             CONCLUSION:  1. Mild bladder wall thickening is noted which may represent cystitis.  Correlate clinically.   LOCATION:  [id number]   Dictated by (CST): Roderick Doll MD on 10/20/2024 at 7:48 AM     Finalized by (CST): Roderick Doll MD on 10/20/2024 at 7:52 AM       XR CHEST AP PORTABLE  (CPT=71045)    Result Date: 10/20/2024  PROCEDURE:  XR CHEST AP PORTABLE  (CPT=71045)  TECHNIQUE:  AP chest radiograph was obtained.  COMPARISON:  [facility], XR, XR CHEST AP PORTABLE  (CPT=71045), 3/29/2023, 3:40 PM.  INDICATIONS:  n/v  PATIENT STATED HISTORY: (As transcribed by Technologist)  Patient shares he has chest pain, nausea, and vomiting.    FINDINGS:  The cardiomediastinal silhouette is within normal limits.  There is no consolidation, effusion, or pneumothorax.  No aggressive osseous lesions are identified.            CONCLUSION: No acute cardiopulmonary abnormality.   LOCATION:  [id number]      Dictated by (CST): Roderick Doll MD on 10/20/2024 at 7:12 AM     Finalized by (CST): Roderick Doll MD on 10/20/2024 at 7:12 AM            MDM      Patient had an IV established and  blood work obtained.  He is placed on a monitor and was in sinus tachycardia.  Patient's blood pressure was on the lower side of normal.  However when we did orthostatics he is significantly orthostatic.  Patient was given fluid resuscitation and feels much better.  His heart rate came down nicely with this.  He had a chest x-ray that personally reviewed not appreciating pneumothorax pleural effusion or consolidation.  He is not hypoxic and I do not feel there is any respiratory etiology to his symptoms.  His hemoglobin is 14 6 however on review of his previous records his hemoglobin tends to be over 17.  He has some mild changes of his BUN and creatinine I suspect that he has a combination of dehydration as well as GI bleed.  He is guaiac positive.  Currently patient has no epigastric pain and is not on any type of anticoagulants nor does he take any over-the-counter NSAIDs or aspirin.  He did state that he had a colonoscopy 2 to 3 years ago and that they did remove some polyps.  He states he is currently due for a colonoscopy soon.  No previous history of GI bleed.    The patient was given a dose of Protonix.  He was given further fluids.  I discussed the case with the [facility] Generalist as well as with GI.  At this time he is hemodynamically stable.  We will add a type and screen and trend his hemoglobin.  He is had no further episodes of stool while in the emergency department.  We did do a CT scan of his abdomen pelvis which was overall unremarkable.  They noted that he might have some mild thickening of his bladder however patient is not symptomatic with any type of UTI symptoms we will send a urinalysis.  Further care and treatment will be per the admitting team.    A total of 38 minutes of critical care time (exclusive of billable procedures) was administered to manage the patient's unstable vital signs due to his initial tachycardia and hypotension consistent with orthostasis related to volume loss most  likely combination of dehydration and GI losses.  This involved direct patient intervention, complex decision making, and/or extensive discussions with the patient, family, and clinical staff.      Admission disposition: 10/20/2024  8:31 AM           Medical Decision Making      Disposition and Plan     Clinical Impression:  1. Gastrointestinal hemorrhage, unspecified gastrointestinal hemorrhage type    2. Orthostatic hypotension    3. Dehydration    4. Metabolic acidosis         Disposition:  Admit  10/20/2024  8:31 am    Follow-up:  No follow-up provider specified.        Medications Prescribed:  Current Discharge Medication List              Supplementary Documentation:         Hospital Problems       Present on Admission  Date Reviewed: 10/20/2024            ICD-10-CM Noted POA    * (Principal) Gastrointestinal hemorrhage, unspecified gastrointestinal hemorrhage type K92.2 10/20/2024 Unknown

## 2024-10-20 NOTE — PROGRESS NOTES
1038: Pt's home medications have been verified. All he takes is xanax (basically before flying) and lisinopril daily at noon. Current /75. Pt has 0.9% NS @ 125 running from the ER. No floor IVF orders. Message sent to Dr Garcia    8789: Spoke to Dr Garcia. Will hold lisinopril and xanax. Will continue tele for at least 24 hours. Will continue IVFs at 83 ml/hr until seen by GI and while pt is NPO.   Statement Selected

## 2024-10-20 NOTE — PROGRESS NOTES
1542: Per the ED notes, GI was notifed this morning of new consult at 0833. Page sent to Dr Velasquez to verify that his service has been notifiied and that they well be seeing pt, and to update him of labs. Baseline hgb is around 16-17. On admit is was 14.6. Latest was 12.0. He has not had any bloody stools since admission to the floor. IVFs running at 83 ml/hr, so  the drop may be partly dilutional.     1545: Spoke to Dr Velasquez. States he saw pt already. He will be putting in his note. Pt ok for CLD. Will be NPO at MD. Plan for EGD tomorrow morning. Diet orders placed.   .

## 2024-10-20 NOTE — ED INITIAL ASSESSMENT (HPI)
Pt BIBEMS for weakness for the past couple of days. Pt endorses nausea and vomiting started at 0400. Zofran given by EMS PTA with some improvement. Tolerates PO well at home.

## 2024-10-20 NOTE — H&P
[facility]     Raffaeel Patton Patient Status:  Emergency    8/10/1960 MRN [id number]   Location [facility] EMERGENCY DEPARTMENT Attending Rosalinda Brar MD   Hosp Day # 0 PCP Jes Floyd MD     Chief Complaint: black diarrhea, syncope today AM    Subjective:    History of Present Illness:     Raffaele Patton is a 64 year old male with history of hypertension, anxiety presented with \"black diarrhea\" and syncope.     Patient reports decreased appetite and about a 15 pound weight loss over the past 2 months.  He developed once a day melenic stools over the past 3 to 4 days.  Last night he had multiple loose melenic stools.  Today morning he had presyncopal symptoms and palpitations so he called the paramedics.  Denies any NSAID use.  No prior history of GI bleeds.    He was tachycardic to 155 and hypotensive to 82/73 on arrival to the ED. HR and BP improved with IVF. BUN 36. Hgb 14.6 (17.3 in 2024). IV protonix administered. GI consulted. EKG sinus tachycardia and no ST-T wave changes concerning for ACS.     History/Other:    Past Medical History:  Past Medical History:    Anxiety    Essential hypertension    High blood pressure     Past Surgical History:   Past Surgical History:   Procedure Laterality Date    Repair ing hernia,5+y/o,reducibl        Family History:   Family History   Problem Relation Age of Onset    Other (Other) Father     Other (lung cancer) Father     Heart Disorder Mother     Other (Other) Mother     Other (osteoporosis) Mother      Social History:    reports that he quit smoking about 38 years ago. His smoking use included cigarettes. He started smoking about 46 years ago. He has a 16 pack-year smoking history. He has never used smokeless tobacco. He reports current alcohol use of about 10.0 - 12.0 standard drinks of alcohol per week. He reports that he does not use drugs.     Allergies: Allergies[1]    Medications:  Medications Ordered Prior to  Encounter[2]    Review of Systems:   A comprehensive review of systems was completed.    Pertinent positives and negatives noted in the HPI.    Objective:   Physical Exam:    BP 91/60 (BP Location: Right arm)   Pulse 85   Temp 97.9 °F (36.6 °C) (Oral)   Resp 18   Ht 5' 11\" (1.803 m)   Wt 210 lb (95.3 kg)   SpO2 99%   BMI 29.29 kg/m²   General: Appears pale  Respiratory: No rhonchi, no wheezes  Cardiovascular: S1, S2.  Tachycardic  Abdomen: Soft, Non-tender, non-distended, positive bowel sounds  Neuro: No new focal deficits  Extremities: No edema    Results:    Labs:      Labs Last 24 Hours:    Recent Labs   Lab 10/20/24  0613   RBC 4.27*   HGB 14.6   HCT 41.8   MCV 97.9   MCH 34.2*   MCHC 34.9   RDW 12.1   NEPRELIM 8.05*   WBC 10.8   .0       Recent Labs   Lab 10/20/24  0613   *   BUN 36*   CREATSERUM 1.01   EGFRCR 83   CA 9.1   ALB 3.9   *   K 5.0      CO2 20.0*   ALKPHO 66   AST 36*   ALT 33   BILT 0.8   TP 6.5       Lab Results   Component Value Date    INR 1.03 10/20/2024    INR 0.95 10/31/2015       No results for input(s): \"TROP\", \"TROPHS\", \"CK\" in the last 168 hours.    No results for input(s): \"TROP\", \"PBNP\" in the last 168 hours.    No results for input(s): \"PCT\" in the last 168 hours.    Imaging: Imaging data reviewed in Epic.    Assessment & Plan:      #Suspected upper GIB  BUN elevated 36. Coags wnl. Hgb 14.6 on arrival  -Serial H&H  -Goal hgb > 7.0   -GI consulted in ED; last colonoscopy with polypectomy (benign)  -IV protonix   -Low threshold to transfer to the ICU    #Syncope   EKG non-ischemic sinus tachycardia. Cxr without acute findings.     #Possible cystitis on CT AP - UA negative    #Hyponatremia    #Hypertension - hold home antihypertensives    Plan of care discussed with patient    Jillian Garcia MD    Supplementary Documentation:     The 21st Century Cures Act makes medical notes like these available to patients in the interest of transparency. Please be  advised this is a medical document. Medical documents are intended to carry relevant information, facts as evident, and the clinical opinion of the practitioner. The medical note is intended as peer to peer communication and may appear blunt or direct. It is written in medical language and may contain abbreviations or verbiage that are unfamiliar.                                       [1] No Known Allergies  [2]   No current facility-administered medications on file prior to encounter.     Current Outpatient Medications on File Prior to Encounter   Medication Sig Dispense Refill    ALPRAZolam 0.5 MG Oral Tab TAKE 1 TABLET BY MOUTH TWICE DAILY AS NEEDED 60 tablet 5    lisinopril 20 MG Oral Tab Take 1 tablet (20 mg total) by mouth daily. 90 tablet 1

## 2024-10-20 NOTE — ED QUICK NOTES
Orders for admission, patient is aware of plan and ready to go upstairs. Any questions, please call ED RN Mary Ellen at extension [phone].     Patient Covid vaccination status: Unvaccinated     COVID Test Ordered in ED: None    COVID Suspicion at Admission: N/A    Running Infusions:  None    Mental Status/LOC at time of transport: A&Ox4    Other pertinent information:   CIWA score: N/A   NIH score:  N/A

## 2024-10-21 ENCOUNTER — ANESTHESIA EVENT (OUTPATIENT)
Dept: ENDOSCOPY | Facility: HOSPITAL | Age: 64
End: 2024-10-21
Payer: COMMERCIAL

## 2024-10-21 ENCOUNTER — ANESTHESIA (OUTPATIENT)
Dept: ENDOSCOPY | Facility: HOSPITAL | Age: 64
End: 2024-10-21
Payer: COMMERCIAL

## 2024-10-21 LAB
ALBUMIN SERPL-MCNC: 3.6 G/DL (ref 3.2–4.8)
ALBUMIN/GLOB SERPL: 1.9 {RATIO} (ref 1–2)
ALP LIVER SERPL-CCNC: 45 U/L
ALT SERPL-CCNC: 24 U/L
ANION GAP SERPL CALC-SCNC: 0 MMOL/L (ref 0–18)
AST SERPL-CCNC: 22 U/L (ref ?–34)
BILIRUB SERPL-MCNC: 0.5 MG/DL (ref 0.2–1.1)
BUN BLD-MCNC: 38 MG/DL (ref 9–23)
CALCIUM BLD-MCNC: 8.4 MG/DL (ref 8.7–10.4)
CHLORIDE SERPL-SCNC: 113 MMOL/L (ref 98–112)
CO2 SERPL-SCNC: 23 MMOL/L (ref 21–32)
CREAT BLD-MCNC: 0.79 MG/DL
EGFRCR SERPLBLD CKD-EPI 2021: 99 ML/MIN/1.73M2 (ref 60–?)
ERYTHROCYTE [DISTWIDTH] IN BLOOD BY AUTOMATED COUNT: 12.2 %
GLOBULIN PLAS-MCNC: 1.9 G/DL (ref 2–3.5)
GLUCOSE BLD-MCNC: 93 MG/DL (ref 70–99)
HCT VFR BLD AUTO: 32.5 %
HCT VFR BLD AUTO: 32.5 %
HGB BLD-MCNC: 11.1 G/DL
HGB BLD-MCNC: 11.2 G/DL
INR BLD: 1.1 (ref 0.8–1.2)
MAGNESIUM SERPL-MCNC: 2 MG/DL (ref 1.6–2.6)
MCH RBC QN AUTO: 33.5 PG (ref 26–34)
MCHC RBC AUTO-ENTMCNC: 34.5 G/DL (ref 31–37)
MCV RBC AUTO: 97.3 FL
OSMOLALITY SERPL CALC.SUM OF ELEC: 291 MOSM/KG (ref 275–295)
PHOSPHATE SERPL-MCNC: 2.8 MG/DL (ref 2.4–5.1)
PLATELET # BLD AUTO: 185 10(3)UL (ref 150–450)
POTASSIUM SERPL-SCNC: 3.3 MMOL/L (ref 3.5–5.1)
PROT SERPL-MCNC: 5.5 G/DL (ref 5.7–8.2)
PROTHROMBIN TIME: 14.4 SECONDS (ref 11.6–14.8)
RBC # BLD AUTO: 3.34 X10(6)UL
SODIUM SERPL-SCNC: 136 MMOL/L (ref 136–145)
WBC # BLD AUTO: 5.7 X10(3) UL (ref 4–11)

## 2024-10-21 PROCEDURE — 0W3P8ZZ CONTROL BLEEDING IN GASTROINTESTINAL TRACT, VIA NATURAL OR ARTIFICIAL OPENING ENDOSCOPIC: ICD-10-PCS | Performed by: INTERNAL MEDICINE

## 2024-10-21 PROCEDURE — 99233 SBSQ HOSP IP/OBS HIGH 50: CPT | Performed by: INTERNAL MEDICINE

## 2024-10-21 DEVICE — REPLAY HEMOSTASIS CLIP, 11MM SPAN
Type: IMPLANTABLE DEVICE | Status: FUNCTIONAL
Brand: REPLAY

## 2024-10-21 RX ORDER — SODIUM CHLORIDE, SODIUM LACTATE, POTASSIUM CHLORIDE, CALCIUM CHLORIDE 600; 310; 30; 20 MG/100ML; MG/100ML; MG/100ML; MG/100ML
INJECTION, SOLUTION INTRAVENOUS CONTINUOUS PRN
Status: DISCONTINUED | OUTPATIENT
Start: 2024-10-21 | End: 2024-10-21 | Stop reason: SURG

## 2024-10-21 RX ORDER — LIDOCAINE HYDROCHLORIDE 10 MG/ML
INJECTION, SOLUTION EPIDURAL; INFILTRATION; INTRACAUDAL; PERINEURAL AS NEEDED
Status: DISCONTINUED | OUTPATIENT
Start: 2024-10-21 | End: 2024-10-21 | Stop reason: SURG

## 2024-10-21 RX ORDER — TAMSULOSIN HYDROCHLORIDE 0.4 MG/1
0.4 CAPSULE ORAL
Status: DISCONTINUED | OUTPATIENT
Start: 2024-10-21 | End: 2024-10-22

## 2024-10-21 RX ADMIN — LIDOCAINE HYDROCHLORIDE 25 MG: 10 INJECTION, SOLUTION EPIDURAL; INFILTRATION; INTRACAUDAL; PERINEURAL at 16:20:00

## 2024-10-21 RX ADMIN — SODIUM CHLORIDE, SODIUM LACTATE, POTASSIUM CHLORIDE, CALCIUM CHLORIDE: 600; 310; 30; 20 INJECTION, SOLUTION INTRAVENOUS at 16:17:00

## 2024-10-21 NOTE — PLAN OF CARE
Pt A&O. On room air. Tele and  monitoring maintained. Remains NPO w/ sips for meds for now. Zofran given for nausea. Last BM 10/20/24. Voiding w/o difficulty. Denies pain. Up ad mahin. Tolerating IVFs as ordered. K+ replaced today per protocol. Plan is to monitor Hgb, next draw due at 1400 today. Tolerating IV protonix. Plan for EGD today at 1530 w/ Dr Jo. Consent signed and on chart.     Per Dr Velasquez: \"If EGD is unrevealing, plan for colonoscopy and CT scan (either outpatient or inpatient, depending on stability and clinical course)\"    Pt lives at home with his spouse, Loreto.

## 2024-10-21 NOTE — PROGRESS NOTES
1754: EGD completed. No active bleeding. CLD. Order for stool for H.pylori and stool panel received.  Enteric plus isolation initiated per protocol. Most likely pt will dc tomorrow w/ f/u EGD in 8 weeks. Pt c/o urinary frequency, burning. Had UA done yesterday. Negative. Encouraged pt to order some cranberry juice with dinner. Page sent to Dr Garcia.    1800: Spoke to Dr Garcia. Will check bladder scan. DC IVFs. Will add flomax. Pt should f/u as outpt with urology.     1815: Bladder scan: 107 ml.

## 2024-10-21 NOTE — CONSULTS
[facility]  Report of GI Consultation    Raffaele Patton Patient Status:  Inpatient    8/10/1960 MRN [id number]   Location [facility] 3SW-A Attending Jillian Garcia MD   Hosp Day # 0 PCP Jes Floyd MD     Date of Admission:  10/20/2024  Date of Consult:  10/20/2024  PCP: Jes Floyd MD    Reason for Consultation:   Melena, concern for GI bleeding    History of Present Illness:   Patient is a 64 year old male who was admitted to the hospital for Gastrointestinal hemorrhage, unspecified gastrointestinal hemorrhage type:    Pt reports that 4-5 days ago, started having dark, formed stools, but over the last 24 hours progressed to liquid black stool.  He has never had this before.  He doesn't take any NSAIDs or anticoagulation; has not used an recent Pepto Bismol.  No EtOH or tobacco.  Does report lack of appetite for the last several months  He has lost 15 lbs since the summer, but wasn't sure if this was because he was more active  He has no family history of gastric malignancy.  He was due for repeat colonoscopy in  with Dr RADHA Melissa, but he did not follow-up with them after issues with scheduling (notes in EPIC reviewed).    Past Medical History  Past Medical History:    Anxiety    Essential hypertension    High blood pressure       Past Surgical History  Past Surgical History:   Procedure Laterality Date    Repair ing hernia,5+y/o,reducibl         Family History  Family History   Problem Relation Age of Onset    Other (Other) Father     Other (lung cancer) Father     Heart Disorder Mother     Other (Other) Mother     Other (osteoporosis) Mother        Social History  Pediatric History   Patient Parents    Not on file     Other Topics Concern    Caffeine Concern No    Exercise No    Seat Belt Yes    Special Diet Not Asked    Stress Concern Not Asked    Weight Concern Not Asked   Social History Narrative    Not on file           Current Medications:  Current Facility-Administered Medications    Medication Dose Route Frequency    acetaminophen (Tylenol Extra Strength) tab 500 mg  500 mg Oral Q4H PRN    melatonin tab 3 mg  3 mg Oral Nightly PRN    polyethylene glycol (PEG 3350) (Miralax) 17 g oral packet 17 g  17 g Oral Daily PRN    sennosides (Senokot) tab 17.2 mg  17.2 mg Oral Nightly PRN    bisacodyl (Dulcolax) 10 MG rectal suppository 10 mg  10 mg Rectal Daily PRN    fleet enema (Fleet) rectal enema 133 mL  1 enema Rectal Once PRN    ondansetron (Zofran) 4 MG/2ML injection 4 mg  4 mg Intravenous Q6H PRN    prochlorperazine (Compazine) 10 MG/2ML injection 5 mg  5 mg Intravenous Q8H PRN    pantoprazole (Protonix) 40 mg in sodium chloride 0.9% PF 10 mL IV push  40 mg Intravenous Q12H    sodium chloride 0.9% infusion   Intravenous Continuous    sodium chloride 0.9% infusion   Intravenous Continuous       Allergies  Allergies[1]    Review of Systems:   A comprehensive 10 point review of systems was completed.  Pertinent positives and negatives noted in the the HPI.     Physical Exam:   Blood pressure 95/65, pulse 100, temperature 98.2 °F (36.8 °C), temperature source Oral, resp. rate 18, height 5' 11\" (1.803 m), weight 210 lb (95.3 kg), SpO2 98%.    GENERAL: NAD, oriented x 3, pleasant  HENT: Normal oral mucosa with good dentition, no ulcers.  EYES: No scleral icterus, no conjunctival pallor.  PULM: Lungs clear to auscultation anteriorly  CV: Regular, no murmurs, 2+ radial pulses  ABD: S/NT/ND, +BS  EXT: No edema, normal muscle mass  SKIN: No rash, no jaundice    Results:     Laboratory Data:  Lab Results   Component Value Date    WBC 10.8 10/20/2024    HGB 12.0 (L) 10/20/2024    .0 10/20/2024    CREATSERUM 1.01 10/20/2024    BUN 36 (H) 10/20/2024     (L) 10/20/2024    K 5.0 10/20/2024    CO2 20.0 (L) 10/20/2024    CA 9.1 10/20/2024    ALB 3.9 10/20/2024    ALKPHO 66 10/20/2024    TP 6.5 10/20/2024    AST 36 (H) 10/20/2024    ALT 33 10/20/2024    BILT 0.8 10/20/2024    PTT 21.7 (L) 10/20/2024     INR 1.03 10/20/2024    PTP 13.6 10/20/2024    TSH 0.719 12/12/2023    BLANCA 36 05/05/2021    LIP 34 10/20/2024    CRP <0.29 05/30/2024    TROP <0.045 05/09/2021    ETOH <3 10/20/2024           No results for input(s): \"URINE\", \"CULTI\", \"BLDSMR\" in the last 168 hours.    Recent Labs   Lab 10/20/24  0613 10/20/24  1445   ALT 33  --    AST 36*  --    ALKPHO 66  --    BILT 0.8  --    HGB 14.6 12.0*   WBC 10.8  --        Imaging:  CT ABDOMEN+PELVIS(CONTRAST ONLY)(CPT=74177)    Result Date: 10/20/2024  CONCLUSION:  1. Mild bladder wall thickening is noted which may represent cystitis.  Correlate clinically.   LOCATION:  [id number]   Dictated by (CST): Roderick Doll MD on 10/20/2024 at 7:48 AM     Finalized by (CST): Roderick Doll MD on 10/20/2024 at 7:52 AM       XR CHEST AP PORTABLE  (CPT=71045)    Result Date: 10/20/2024  CONCLUSION: No acute cardiopulmonary abnormality.   LOCATION:  [id number]      Dictated by (CST): Roderick Doll MD on 10/20/2024 at 7:12 AM     Finalized by (CST): Roderick Doll MD on 10/20/2024 at 7:12 AM           Impression:   Change in stool  Melena  Epigastric discomfort  Loss of appetite with 15 lb weight loss (unclear if intentional or not, pt no sure)  Drop in Hgb from baseline    Recommendations:  PPI twice daily  Clear liquid diet now  NPO at midnight  EGD with anesthesia tomorrow for evaluation of cause and possible control of bleeding  Dispo TBD by results of EGD  If EGD is unrevealing, plan for colonoscopy and CT scan (either outpatient or inpatient, depending on stability and clinical course)    Thank you for allowing me to participate in the care of your patient.    Brandin Velasquez MD  10/20/2024         [1] No Known Allergies

## 2024-10-21 NOTE — OPERATIVE REPORT
Operative Report-Esophagogastroduodenoscopy      PREOPERATIVE DIAGNOSIS/INDICATION: Melena   POSTOPERTATIVE DIAGNOSIS: Gastric ulcers, esophageal ulcers    PROCEDURE PERFORMED: EGD    INFORMED CONSENT: Once a brief history and physical examination was performed, the risks, benefits and alternatives to the procedure were discussed with the patient and/or family and informed consent was obtained.  The risks of sedation, perforation, missed lesions and need for surgery were all discussed.  Patient expressed understanding of the risks and agreed to proceed.      PROCEDURE DESCRIPTION:  The patient was then brought to the endoscopy suite where his/her pulse, pulse oximetry and blood pressure were monitored. He/she was placed in the left lateral decubitus position and deep sedation was administered. Once adequate sedation was achieved, a bite block was placed and a lubricated tip of an Olympus video upper endoscope was inserted through the oropharynx and gently manipulated through the esophagus into the stomach and the distal duodenum. Upon withdrawal of the endoscope, careful visualization of the mucosa was performed.     FINDINGS:    ESOPHAGUS: Large GE junction ulcer with pigmented spot with no active bleeding.  This was clipped x 2 with no bleeding at the end of the case.  STOMACH: Multiple nonbleeding clean-based small gastric ulcer seen throughout the body and antrum of the stomach.  Normal stomach on retroflexion.  DUODENUM: Duodenitis of the bulb but otherwise normal to examined extent.    THERAPEUTICS: Clip x 2  RECOMMENDATIONS:   Post EGD precautions, watch for bleeding, infection, perforation, adverse drug reactions   H. pylori stool antigen  Pantoprazole 40 mg BID  Clear liquid diet  Repeat EGD in 8 weeks.    Josue Jo MD  10/21/2024  4:35 PM

## 2024-10-21 NOTE — PROGRESS NOTES
[facility]   part of [facility]     Hospitalist Progress Note     Raffaele Patton Patient Status:  Inpatient    8/10/1960 MRN [id number]   Location [facility] 3SW-A Attending Jillian Garcia MD   Hosp Day # 1 PCP Jes Floyd MD     Chief Complaint: Melena    Subjective:     EGD today. No further melenic stools since admission. Hrs improved to 80s today AM. Denies lightheadedness, dizziness.     Objective:    Review of Systems:   A comprehensive review of systems was completed; pertinent positive and negatives stated in subjective.    Vital signs:  Temp:  [97.9 °F (36.6 °C)-98.7 °F (37.1 °C)] 98.7 °F (37.1 °C)  Pulse:  [] 92  Resp:  [18-21] 20  BP: ()/(60-90) 144/68  SpO2:  [96 %-100 %] 99 %    Physical Exam:    General: No acute distress  Respiratory: No wheezes, no rhonchi  Cardiovascular: S1, S2, regular rate and rhythm  Abdomen: Soft, Non-tender, non-distended, positive bowel sounds  Neuro: No new focal deficits.   Extremities: No edema    Diagnostic Data:    Labs:  Recent Labs   Lab 10/20/24  0613 10/20/24  1445 10/20/24  2103 10/21/24  0508   WBC 10.8  --   --  5.7   HGB 14.6 12.0* 12.1* 11.2*   MCV 97.9  --   --  97.3   .0  --   --  185.0   INR 1.03  --   --  1.10       Recent Labs   Lab 10/20/24  0613 10/21/24  0547   * 93   BUN 36* 38*   CREATSERUM 1.01 0.79   CA 9.1 8.4*   ALB 3.9 3.6   * 136   K 5.0 3.3*    113*   CO2 20.0* 23.0   ALKPHO 66 45   AST 36* 22   ALT 33 24   BILT 0.8 0.5   TP 6.5 5.5*       Estimated Creatinine Clearance: 100.6 mL/min (based on SCr of 0.79 mg/dL).    No results for input(s): \"TROP\", \"TROPHS\", \"CK\" in the last 168 hours.    Recent Labs   Lab 10/20/24  0613 10/21/24  0508   PTP 13.6 14.4   INR 1.03 1.10                  Microbiology    No results found for this visit on 10/20/24.      Imaging: Reviewed in Epic.    Medications:    potassium chloride  40 mEq Intravenous Once    pantoprazole  40 mg Intravenous Q12H        Assessment & Plan:      #Suspected upper GIB  #Weight loss, change in appetite  BUN elevated 36. Coags wnl. Hgb 14.6 on arrival  -Serial H&H  -Goal hgb > 7.0   -GI following; EGD today   -IV protonix      #Syncope   EKG non-ischemic sinus tachycardia. Cxr without acute findings.      #Possible cystitis on CT AP - UA negative     #Hyponatremia     #Hypertension - hold home antihypertensives      Jillian Garcia MD    Supplementary Documentation:     Quality:  DVT Mechanical Prophylaxis:     Early ambuation  DVT Pharmacologic Prophylaxis   Medication   None                Code Status: Not on file  Ramírez: No urinary catheter in place  Ramírez Duration (in days):   Central line:    ABI:     Discharge is dependent on: course  At this point Mr. Patton is expected to be discharge to: home    The 21st Century Cures Act makes medical notes like these available to patients in the interest of transparency. Please be advised this is a medical document. Medical documents are intended to carry relevant information, facts as evident, and the clinical opinion of the practitioner. The medical note is intended as peer to peer communication and may appear blunt or direct. It is written in medical language and may contain abbreviations or verbiage that are unfamiliar.

## 2024-10-21 NOTE — PLAN OF CARE
A&Ox4.  .  Telemetry monitoring.  Ankle pumps encouraged.  NPO at midnight for procedure in the morning.  LBM 10/20, voiding freely.  Ambulating ad mahin.  Nausea noted, but denies pain, dizziness, lightheadedness.  No bloody stools since admission.  Plan is EGD tomorrow, consent obtained.  Patient updated and in agreement with plan of care.  Safety precautions in place.  Instructed patient to call for assistance.  Call light within reach.

## 2024-10-21 NOTE — ANESTHESIA PREPROCEDURE EVALUATION
PRE-OP EVALUATION    Patient Name: Raffaele Patton    Admit Diagnosis: Orthostatic hypotension [I95.1]  Dehydration [E86.0]  Metabolic acidosis [E87.20]  Gastrointestinal hemorrhage, unspecified gastrointestinal hemorrhage type [K92.2]    Pre-op Diagnosis: GI bleed [K92.2]    ESOPHAGOGASTRODUODENOSCOPY (EGD)    Anesthesia Procedure: ESOPHAGOGASTRODUODENOSCOPY (EGD)    Surgeons and Role:     * Josue Jo MD - Primary    Pre-op vitals reviewed.  Temp: 98.3 °F (36.8 °C)  Pulse: 86  Resp: 13  BP: 144/97  SpO2: 99 %  Body mass index is 29.29 kg/m².    Current medications reviewed.  Hospital Medications:   [COMPLETED] potassium chloride 40 mEq in 250mL sodium chloride 0.9% IVPB premix  40 mEq Intravenous Once    [COMPLETED] sodium chloride 0.9 % IV bolus 1,000 mL  1,000 mL Intravenous Once    [COMPLETED] iopamidol 76% (ISOVUE-370) injection for power injector  100 mL Intravenous ONCE PRN    [COMPLETED] pantoprazole (Protonix) 40 mg in sodium chloride 0.9% PF 10 mL IV push  40 mg Intravenous Once    [] sodium chloride 0.9% infusion   Intravenous Continuous    acetaminophen (Tylenol Extra Strength) tab 500 mg  500 mg Oral Q4H PRN    melatonin tab 3 mg  3 mg Oral Nightly PRN    polyethylene glycol (PEG 3350) (Miralax) 17 g oral packet 17 g  17 g Oral Daily PRN    sennosides (Senokot) tab 17.2 mg  17.2 mg Oral Nightly PRN    bisacodyl (Dulcolax) 10 MG rectal suppository 10 mg  10 mg Rectal Daily PRN    fleet enema (Fleet) rectal enema 133 mL  1 enema Rectal Once PRN    ondansetron (Zofran) 4 MG/2ML injection 4 mg  4 mg Intravenous Q6H PRN    prochlorperazine (Compazine) 10 MG/2ML injection 5 mg  5 mg Intravenous Q8H PRN    pantoprazole (Protonix) 40 mg in sodium chloride 0.9% PF 10 mL IV push  40 mg Intravenous Q12H    [COMPLETED] sodium chloride 0.9 % IV bolus 500 mL  500 mL Intravenous Once    [COMPLETED] metoclopramide (Reglan) 5 mg/mL injection 10 mg  10 mg Intravenous Once    [COMPLETED] diphenhydrAMINE  (Benadryl) 50 mg/mL  injection 50 mg  50 mg Intravenous Once    [] sodium chloride 0.9% infusion   Intravenous Continuous    sodium chloride 0.9% infusion   Intravenous Continuous       Outpatient Medications:   Prescriptions Prior to Admission[1]    Allergies: Patient has no known allergies.      Anesthesia Evaluation    Patient summary reviewed.    Anesthetic Complications  (-) history of anesthetic complications         GI/Hepatic/Renal    Negative GI/hepatic/renal ROS.                             Cardiovascular    Negative cardiovascular ROS.    Exercise tolerance: good     MET: >4      (+) hypertension                                     Endo/Other    Negative endo/other ROS.                              Pulmonary    Negative pulmonary ROS.                       Neuro/Psych    Negative neuro/psych ROS.    (+) anxiety                              Past Surgical History:   Procedure Laterality Date    Repair ing hernia,5+y/o,reducibl       Social History     Socioeconomic History    Marital status:    Tobacco Use    Smoking status: Former     Current packs/day: 0.00     Average packs/day: 2.0 packs/day for 8.0 years (16.0 ttl pk-yrs)     Types: Cigarettes     Start date: 1978     Quit date: 1986     Years since quittin.8    Smokeless tobacco: Never   Vaping Use    Vaping status: Never Used   Substance and Sexual Activity    Alcohol use: Yes     Alcohol/week: 10.0 - 12.0 standard drinks of alcohol     Types: 10 - 12 Standard drinks or equivalent per week    Drug use: No   Other Topics Concern    Caffeine Concern No    Exercise No    Seat Belt Yes     History   Drug Use No     Available pre-op labs reviewed.  Lab Results   Component Value Date    WBC 5.7 10/21/2024    RBC 3.34 (L) 10/21/2024    HGB 11.1 (L) 10/21/2024    HCT 32.5 (L) 10/21/2024    MCV 97.3 10/21/2024    MCH 33.5 10/21/2024    MCHC 34.5 10/21/2024    RDW 12.2 10/21/2024    .0 10/21/2024     Lab Results   Component  Value Date     10/21/2024    K 3.3 (L) 10/21/2024     (H) 10/21/2024    CO2 23.0 10/21/2024    BUN 38 (H) 10/21/2024    CREATSERUM 0.79 10/21/2024    GLU 93 10/21/2024    CA 8.4 (L) 10/21/2024     Lab Results   Component Value Date    INR 1.10 10/21/2024         Airway      Mallampati: II  Mouth opening: >3 FB  TM distance: > 6 cm  Neck ROM: full Cardiovascular    Cardiovascular exam normal.  Rhythm: regular  Rate: normal     Dental    Dentition appears grossly intact         Pulmonary    Pulmonary exam normal.  Breath sounds clear to auscultation bilaterally.               Other findings              ASA: 2   Plan: MAC  NPO status verified and patient meets guidelines.  Patient has not taken beta blockers in last 24 hours.  Post-procedure pain management plan discussed with surgeon and patient.      Plan/risks discussed with: patient                Present on Admission:  **None**             [1]   Medications Prior to Admission   Medication Sig Dispense Refill Last Dose/Taking    ALPRAZolam 0.5 MG Oral Tab TAKE 1 TABLET BY MOUTH TWICE DAILY AS NEEDED 60 tablet 5 Past Month    lisinopril 20 MG Oral Tab Take 1 tablet (20 mg total) by mouth daily. 90 tablet 1 10/19/2024 Noon

## 2024-10-21 NOTE — ANESTHESIA POSTPROCEDURE EVALUATION
[facility]    Raffaele Patton Patient Status:  Inpatient   Age/Gender 64 year old male MRN [id number]   Location [facility] Attending Jillian Garcia MD   Hosp Day # 1 PCP Jes Floyd MD       Anesthesia Post-op Note    ESOPHAGOGASTRODUODENOSCOPY (EGD) with clip placement x2    Procedure Summary       Date: 10/21/24 Room / Location:  ENDOSCOPY 04 /  ENDOSCOPY    Anesthesia Start: 1617 Anesthesia Stop: 1648    Procedure: ESOPHAGOGASTRODUODENOSCOPY (EGD) with clip placement x2 Diagnosis:       GI bleed      (Gastric ulcers)    Surgeons: Josue Jo MD Anesthesiologist: Spencer Melissa MD    Anesthesia Type: MAC ASA Status: 2            Anesthesia Type: MAC    Vitals Value Taken Time   /84 10/21/24 1653   Temp 98.0 10/21/24 1654   Pulse 78 10/21/24 1653   Resp 14 10/21/24 1653   SpO2 100 % 10/21/24 1653   Vitals shown include unfiled device data.    Patient Location: Endoscopy    Anesthesia Type: MAC    Airway Patency: patent    Postop Pain Control: adequate    Mental Status: mildly sedated but able to meaningfully participate in the post-anesthesia evaluation    Nausea/Vomiting: none    Cardiopulmonary/Hydration status: stable euvolemic    Complications: no apparent anesthesia related complications    Postop vital signs: stable    Dental Exam: Unchanged from Preop    Patient to be discharged from PACU when criteria met.

## 2024-10-21 NOTE — PAYOR COMM NOTE
--------------  ADMISSION REVIEW     Payor: RACHELE LIU POS/MCNP  Subscriber #:  [id number]  Authorization Number: [id number]    Admit date: 10/20/24  Admit time:  9:59 AM       History   HPI  64-year-old male presents to the emergency department with a near syncopal episode and diarrhea.  Patient states for the last 3 days he has been having some dark stools.  He states early this morning he felt like he had \"a rockhard abdomen\" and then went to the washroom.  He states he had a copious amount of diarrhea and felt very lightheaded.  He states he made it back to bed and his wife checked his blood pressure and it was 93 over palp.  He states that then they called the ambulance.  He states he had quite a bit of nausea but they gave him nausea medications and now that is resolved.  He is not on any anticoagulants.  No history of GI bleeds.  He states he is on lisinopril but no other medications on a regular basis he states he has Xanax but he very rarely if ever takes it.  He states currently he feels hot but has not had any documented fevers.  He states his appetite was good last night and he ate around 9 PM without any difficulty.  No other acute complaints    ED Triage Vitals [10/20/24 0611]   /88   Pulse (!) 135   Resp 19   Temp 97.9 °F (36.6 °C)   Temp src Oral   SpO2 100 %   O2 Device None (Room air)     HENT:      Head: Normocephalic and atraumatic.      Mouth/Throat:      Mouth: Mucous membranes are dry.   Eyes:      Extraocular Movements: Extraocular movements intact.      Pupils: Pupils are equal, round, and reactive to light.   Cardiovascular:      Rate and Rhythm: Regular rhythm. Tachycardia present.      Pulses: Normal pulses.      Heart sounds: Normal heart sounds.   Pulmonary:      Effort: Pulmonary effort is normal.      Breath sounds: Normal breath sounds. No stridor.   Abdominal:      General: Bowel sounds are normal.      Palpations: Abdomen is soft.      Tenderness: There is no guarding or  rebound.   Genitourinary:     Rectum: Normal. Guaiac result positive.      Comments: Small nonbleeding external hemorrhoid, minimal dark stool guaiac positive  Musculoskeletal:         General: Normal range of motion.      Cervical back: Normal range of motion and neck supple.   Lymphadenopathy:      Cervical: No cervical adenopathy.   Skin:     General: Skin is warm and dry.   Neurological:      General: No focal deficit present.      Mental Status: He is alert and oriented to person, place, and time.     Labs Reviewed   COMP METABOLIC PANEL (14) - Abnormal; Notable for the following components:       Result Value    Glucose 180 (*)     Sodium 135 (*)     CO2 20.0 (*)     BUN 36 (*)     AST 36 (*)     All other components within normal limits   URINALYSIS WITH CULTURE REFLEX - Abnormal; Notable for the following components:    Spec Gravity >1.030 (*)     Ketones Urine 20 (*)     All other components within normal limits   PTT, ACTIVATED - Abnormal; Notable for the following components:    PTT 21.7 (*)     All other components within normal limits   CBC WITH DIFFERENTIAL WITH PLATELET - Abnormal; Notable for the following components:    RBC 4.27 (*)     MCH 34.2 (*)     Neutrophil Absolute Prelim 8.05 (*)     Neutrophil Absolute 8.05 (*)    EKG    Rate, intervals and axes as noted on EKG Report.  Rate: 133  Rhythm: Sinus Rhythm  Reading: No acute ST segment elevation increased voltage overall    CT ABDOMEN+PELVIS(CONTRAST ONLY)(CPT=74177)  Result Date: 10/20/2024   CONCLUSION:  1. Mild bladder wall thickening is noted which may represent cystitis.  Correlate clinically.   LOCATION:  [id number]   Dictated by (CST): Roderick Doll MD on 10/20/2024 at 7:48 AM     Finalized by (CST): Roderick Doll MD on 10/20/2024 at 7:52 AM       XR CHEST AP PORTABLE  (CPT=71045)  Result Date: 10/20/2024  CONCLUSION: No acute cardiopulmonary abnormality.   LOCATION:  [id number]      Dictated by (CST): Roderick Doll MD on 10/20/2024 at 7:12 AM      Finalized by (CST): Roderick Doll MD on 10/20/2024 at 7:12 AM       Admission disposition: 10/20/2024  8:31 AM    Disposition and Plan   Clinical Impression:  1. Gastrointestinal hemorrhage, unspecified gastrointestinal hemorrhage type    2. Orthostatic hypotension    3. Dehydration    4. Metabolic acidosis       Disposition:  Admit  10/20/2024  8:31 am    History and Physical    History of Present Illness:    Raffaele Patton is a 64 year old male with history of hypertension, anxiety presented with \"black diarrhea\" and syncope.      Patient reports decreased appetite and about a 15 pound weight loss over the past 2 months.  He developed once a day melenic stools over the past 3 to 4 days.  Last night he had multiple loose melenic stools.  Today morning he had presyncopal symptoms and palpitations so he called the paramedics.  Denies any NSAID use.  No prior history of GI bleeds.     He was tachycardic to 155 and hypotensive to 82/73 on arrival to the ED. HR and BP improved with IVF. BUN 36. Hgb 14.6 (17.3 in 05/2024). IV protonix administered. GI consulted. EKG sinus tachycardia and no ST-T wave changes concerning for ACS.     BP 91/60 (BP Location: Right arm)   Pulse 85   Temp 97.9 °F (36.6 °C) (Oral)   Resp 18   Ht 5' 11\" (1.803 m)   Wt 210 lb (95.3 kg)   SpO2 99%   BMI 29.29 kg/m²   General: Appears pale  Respiratory: No rhonchi, no wheezes  Cardiovascular: S1, S2.  Tachycardic  Abdomen: Soft, Non-tender, non-distended, positive bowel sounds  Neuro: No new focal deficits  Extremities: No edema    Lab 10/20/24  0613   RBC 4.27*   HGB 14.6   HCT 41.8   MCV 97.9   MCH 34.2*   MCHC 34.9   RDW 12.1   NEPRELIM 8.05*   WBC 10.8   .0      Lab 10/20/24  0613   *   BUN 36*   CREATSERUM 1.01   EGFRCR 83   CA 9.1   ALB 3.9   *   K 5.0      CO2 20.0*   ALKPHO 66   AST 36*   ALT 33   BILT 0.8   TP 6.5      Lab Results   Component Value Date     INR 1.03 10/20/2024      Assessment & Plan:     #Suspected upper GIB  BUN elevated 36. Coags wnl. Hgb 14.6 on arrival  -Serial H&H  -Goal hgb > 7.0   -GI consulted in ED; last colonoscopy with polypectomy (benign)  -IV protonix   -Low threshold to transfer to the ICU     #Syncope   EKG non-ischemic sinus tachycardia. Cxr without acute findings.      #Possible cystitis on CT AP - UA negative     #Hyponatremia     #Hypertension - hold home antihypertensives       GASTROENTEROLOGY  Reason for Consultation:   Melena, concern for GI bleeding     History of Present Illness:   Patient is a 64 year old male who was admitted to the hospital for Gastrointestinal hemorrhage, unspecified gastrointestinal hemorrhage type:     Pt reports that 4-5 days ago, started having dark, formed stools, but over the last 24 hours progressed to liquid black stool.  He has never had this before.  He doesn't take any NSAIDs or anticoagulation; has not used an recent Pepto Bismol.  No EtOH or tobacco.  Does report lack of appetite for the last several months  He has lost 15 lbs since the summer, but wasn't sure if this was because he was more active  He has no family history of gastric malignancy.  Impression:   Change in stool  Melena  Epigastric discomfort  Loss of appetite with 15 lb weight loss (unclear if intentional or not, pt no sure)  Drop in Hgb from baseline     Recommendations:  PPI twice daily  Clear liquid diet now  NPO at midnight  EGD with anesthesia tomorrow for evaluation of cause and possible control of bleeding  Dispo TBD by results of EGD  If EGD is unrevealing, plan for colonoscopy and CT scan (either outpatient or inpatient, depending on stability and clinical course)           10/21/24  HRs improved to 80s today AM.      Temp:  [97.9 °F (36.6 °C)-98.7 °F (37.1 °C)] 98.7 °F (37.1 °C)  Pulse:  [] 92  Resp:  [18-21] 20  BP: ()/(60-90) 144/68  SpO2:  [96 %-100 %] 99 %     Physical Exam:    Respiratory: No wheezes, no rhonchi  Cardiovascular: S1, S2, regular  rate and rhythm  Abdomen: Soft, Non-tender, non-distended, positive bowel sounds  Neuro: No new focal deficits.   Extremities: No edema  Lab 10/20/24  0613 10/20/24  1445 10/20/24  2103 10/21/24  0508   WBC 10.8  --   --  5.7   HGB 14.6 12.0* 12.1* 11.2*   MCV 97.9  --   --  97.3   .0  --   --  185.0   INR 1.03  --   --  1.10      Lab 10/20/24  0613 10/21/24  0547   * 93   BUN 36* 38*   CREATSERUM 1.01 0.79   CA 9.1 8.4*   ALB 3.9 3.6   * 136   K 5.0 3.3*    113*   CO2 20.0* 23.0   ALKPHO 66 45   AST 36* 22   ALT 33 24   BILT 0.8 0.5   TP 6.5 5.5*      Lab 10/20/24  0613 10/21/24  0508   PTP 13.6 14.4   INR 1.03 1.10       Medications:    potassium chloride  40 mEq Intravenous Once    pantoprazole  40 mg Intravenous Q12H       Assessment & Plan:    #Suspected upper GIB  #Weight loss, change in appetite  BUN elevated 36. Coags wnl. Hgb 14.6 on arrival  -Serial H&H  -Goal hgb > 7.0   -GI following; EGD today   -IV protonix      #Syncope   EKG non-ischemic sinus tachycardia. Cxr without acute findings.      #Possible cystitis on CT AP - UA negative     #Hyponatremia     #Hypertension - hold home antihypertensives     MEDICATIONS ADMINISTERED IN LAST 1 DAY:  ondansetron (Zofran) 4 MG/2ML injection 4 mg       Date Action Dose Route User    10/21/2024 1339 Given 4 mg Intravenous Yovani Flaherty RN    10/20/2024 2138 Given 4 mg Intravenous Idania Sweeney RN          pantoprazole (Protonix) 40 mg in sodium chloride 0.9% PF 10 mL IV push       Date Action Dose Route User    10/21/2024 0902 Given 40 mg Intravenous Yovani Flaherty RN    10/20/2024 2138 Given 40 mg Intravenous Idania Sweeney RN          potassium chloride 40 mEq in 250mL sodium chloride 0.9% IVPB premix       Date Action Dose Route User    10/21/2024 0901 New Bag 40 mEq Intravenous Yovani Flaherty RN          sodium chloride 0.9% infusion       Date Action Dose Route User    10/21/2024 0574 New Bag (none) Intravenous  Idania Sweeney, RN    10/20/2024 1602 New Bag (none) Intravenous Yovani Flaherty RN            Vitals (last day)       Date/Time Temp Pulse Resp BP SpO2 Weight O2 Device O2 Flow Rate (L/min) [facility]    10/21/24 1202 98.3 °F (36.8 °C) 81 20 123/78 98 % -- None (Room air) --     10/21/24 0804 98.7 °F (37.1 °C) 92 20 144/68 99 % -- None (Room air) --     10/21/24 0500 97.9 °F (36.6 °C) 66 18 109/75 96 % -- None (Room air) 0 L/min AF    10/20/24 2000 98.3 °F (36.8 °C) 85 18 111/69 97 % -- None (Room air) 0 L/min     10/20/24 1537 98.2 °F (36.8 °C) 100 18 95/65 98 % -- None (Room air) -- CM    10/20/24 0636 -- 109 16 102/78 100 % -- None (Room air) -- SC    10/20/24 0630 -- 155 19 82/73 100 % -- -- -- SC    10/20/24 0625 -- 152 -- 82/73 -- -- -- -- SC    10/20/24 0623 -- 130 -- 98/75 -- -- -- -- SC    10/20/24 0621 -- 111 -- 104/78 -- -- -- -- SC    10/20/24 0620 -- 111 -- 104/78 -- -- -- -- SC    10/20/24 0611 97.9 °F (36.6 °C) 135 19 107/88 100 % 210 lb (95.3 kg) None (Room air) -- SC

## 2024-10-22 VITALS
WEIGHT: 210 LBS | HEIGHT: 71 IN | HEART RATE: 80 BPM | TEMPERATURE: 99 F | DIASTOLIC BLOOD PRESSURE: 82 MMHG | BODY MASS INDEX: 29.4 KG/M2 | SYSTOLIC BLOOD PRESSURE: 116 MMHG | OXYGEN SATURATION: 96 % | RESPIRATION RATE: 16 BRPM

## 2024-10-22 LAB
ERYTHROCYTE [DISTWIDTH] IN BLOOD BY AUTOMATED COUNT: 11.9 %
ERYTHROCYTE [DISTWIDTH] IN BLOOD BY AUTOMATED COUNT: 11.9 %
HCT VFR BLD AUTO: 27.5 %
HCT VFR BLD AUTO: 28.1 %
HGB BLD-MCNC: 10 G/DL
HGB BLD-MCNC: 9.7 G/DL
MCH RBC QN AUTO: 34.2 PG (ref 26–34)
MCH RBC QN AUTO: 34.2 PG (ref 26–34)
MCHC RBC AUTO-ENTMCNC: 35.3 G/DL (ref 31–37)
MCHC RBC AUTO-ENTMCNC: 35.6 G/DL (ref 31–37)
MCV RBC AUTO: 96.2 FL
MCV RBC AUTO: 96.8 FL
PLATELET # BLD AUTO: 157 10(3)UL (ref 150–450)
PLATELET # BLD AUTO: 163 10(3)UL (ref 150–450)
POTASSIUM SERPL-SCNC: 3.7 MMOL/L (ref 3.5–5.1)
RBC # BLD AUTO: 2.84 X10(6)UL
RBC # BLD AUTO: 2.92 X10(6)UL
WBC # BLD AUTO: 4.1 X10(3) UL (ref 4–11)
WBC # BLD AUTO: 4.1 X10(3) UL (ref 4–11)

## 2024-10-22 PROCEDURE — 99239 HOSP IP/OBS DSCHRG MGMT >30: CPT | Performed by: INTERNAL MEDICINE

## 2024-10-22 RX ORDER — LISINOPRIL 20 MG/1
20 TABLET ORAL DAILY
Qty: 90 TABLET | Refills: 1 | Status: SHIPPED | OUTPATIENT
Start: 2024-10-28

## 2024-10-22 RX ORDER — TAMSULOSIN HYDROCHLORIDE 0.4 MG/1
0.4 CAPSULE ORAL DAILY
Qty: 30 CAPSULE | Refills: 0 | Status: SHIPPED | OUTPATIENT
Start: 2024-10-22 | End: 2024-11-21

## 2024-10-22 RX ORDER — PANTOPRAZOLE SODIUM 40 MG/1
40 TABLET, DELAYED RELEASE ORAL
Qty: 60 TABLET | Refills: 3 | Status: SHIPPED | OUTPATIENT
Start: 2024-10-22

## 2024-10-22 NOTE — DISCHARGE INSTRUCTIONS
Please hold your lisinopril for now given recent bleed. Please check your BP daily and if > 120/70 and going up you can restart in the next week as long there is no concern for active bleeding.

## 2024-10-22 NOTE — PROGRESS NOTES
Meds transferred to [facility] in [place] to be filled. Verified prior to dc.

## 2024-10-22 NOTE — CDS QUERY
Dear Dr. Garcia,     Clinical information includes a diagnosis of HYPONATREMIA. Based on your judgement and review of the clinical findings, can you please specify further the diagnosis treated?    [ X  ] Hyponatremia ruled out     [   ] Hyponatremia confirmed (please provide supportive indicators):    [   ] Other (please specify):        CLINICAL INFORMATION FROM THE MEDICAL RECORD    Risk Factors:  Decreased oral intake    Clinical Indicators:   Na 135 on admission, corrected Na 136 with blood glucose 180    Treatment:   Repeat BMP    For questions regarding this query, please contact Clinical : Sol Rockwell RN at #[phone].    THIS FORM IS A PERMANENT PART OF THE MEDICAL RECORD

## 2024-10-22 NOTE — PLAN OF CARE
Patient A & O x4. VSS, on RA. Denies pain/nausea at this time. Tolerating clear liquids. Up ad mahin. Safety measures in place. Instructed to use call light.

## 2024-10-22 NOTE — PROGRESS NOTES
[facility] Gastroenterology Progress Note    CC: Melena  S: No bleeding sx overnight; tolerating CLD   O: /82 (BP Location: Right arm)   Pulse 80   Temp 98.5 °F (36.9 °C) (Oral)   Resp 16   Ht 5' 11\" (1.803 m)   Wt 210 lb (95.3 kg)   SpO2 96%   BMI 29.29 kg/m²     Gen: NAD  Abd: (+)BS, soft, non-tender, non-distended; no rebound or guarding    Laboratory/Imaging Data:     No results for input(s): \"PGLU\" in the last 168 hours.  Recent Labs   Lab 10/20/24  0613 10/21/24  0508   INR 1.03 1.10         Recent Labs   Lab 10/20/24  0613 10/20/24  1445 10/20/24  2103 10/21/24  0508 10/21/24  1518 10/22/24  0508   WBC 10.8  --   --  5.7  --  4.1   HGB 14.6 12.0* 12.1* 11.2* 11.1* 9.7*   .0  --   --  185.0  --  163.0       Recent Labs   Lab 10/20/24  0613 10/21/24  0547 10/22/24  0508   * 136  --    K 5.0 3.3* 3.7    113*  --    CO2 20.0* 23.0  --    BUN 36* 38*  --    CREATSERUM 1.01 0.79  --        Recent Labs   Lab 10/20/24  0613 10/21/24  0547   ALT 33 24   AST 36* 22       Assessment/Plan:   65 yo M presenting with melena. EGD 10/21 with gastric ulcers, esophageal ulcer s/p clip x 2. No GIB sx at this time. Mild drop in hgb this am, but stable on recheck, possibly re-equilibration.   - ok to ADAT  - Pantoprazole 40 mg BID   - OV in 3-4 weeks  - Repeat EGD in 8 weeks    GI will sign off at this time. Please call back with any questions or concerns.     Josue Jo MD, 10/22/24, 9:18 AM  [facility] Gastroenterology

## 2024-10-22 NOTE — CDS QUERY
Dear Dr. Garcia,     Can you please provide a diagnosis, if applicable, to the hemoglobin (HGB) value of 10.0?     [  X ] Anemia due to acute blood loss    [   ] Other (Please specify):      CLINICAL INFORMATION FROM THE MEDICAL RECORD    Risk Factors:  Possible upper GI bleeding        Clinical Indicators:   Pt reports black diarrhea at home  HGB 14.6 on admission (10/20/2024)  Serial HGB as follows: HGB 12.0 (10/20/2024), HGB 12.1 (10/20/2024), 11.2 (10/21/2024), 11.1 (10/21/2024), 9.7 (10/22/2024), 10.0 (10/22/2024)  10/22/2024 GI Assessment: Mild drop in HGB this am, but stable on recheck, possibly re-equilibration     Treatment:   Serial HGB and HCT    For questions regarding this query, please contact Clinical : Sol Rockwell RN at #[phone].    THIS FORM IS A PERMANENT PART OF THE MEDICAL RECORD

## 2024-10-23 ENCOUNTER — PATIENT OUTREACH (OUTPATIENT)
Dept: CASE MANAGEMENT | Age: 64
End: 2024-10-23

## 2024-10-23 ENCOUNTER — LAB ENCOUNTER (OUTPATIENT)
Dept: LAB | Age: 64
End: 2024-10-23
Attending: FAMILY MEDICINE
Payer: COMMERCIAL

## 2024-10-23 DIAGNOSIS — D50.9 IRON DEFICIENCY ANEMIA, UNSPECIFIED IRON DEFICIENCY ANEMIA TYPE: ICD-10-CM

## 2024-10-23 DIAGNOSIS — D50.9 IRON DEFICIENCY ANEMIA, UNSPECIFIED IRON DEFICIENCY ANEMIA TYPE: Primary | ICD-10-CM

## 2024-10-23 LAB
ALBUMIN SERPL-MCNC: 4.1 G/DL (ref 3.2–4.8)
ALBUMIN/GLOB SERPL: 1.9 {RATIO} (ref 1–2)
ALP LIVER SERPL-CCNC: 51 U/L
ALT SERPL-CCNC: 37 U/L
ANION GAP SERPL CALC-SCNC: 5 MMOL/L (ref 0–18)
AST SERPL-CCNC: 37 U/L (ref ?–34)
BASOPHILS # BLD AUTO: 0.01 X10(3) UL (ref 0–0.2)
BASOPHILS NFR BLD AUTO: 0.2 %
BILIRUB SERPL-MCNC: 0.4 MG/DL (ref 0.2–1.1)
BUN BLD-MCNC: 11 MG/DL (ref 9–23)
CALCIUM BLD-MCNC: 9 MG/DL (ref 8.7–10.4)
CHLORIDE SERPL-SCNC: 108 MMOL/L (ref 98–112)
CO2 SERPL-SCNC: 25 MMOL/L (ref 21–32)
CREAT BLD-MCNC: 0.89 MG/DL
DEPRECATED HBV CORE AB SER IA-ACNC: 293 NG/ML
EGFRCR SERPLBLD CKD-EPI 2021: 96 ML/MIN/1.73M2 (ref 60–?)
EOSINOPHIL # BLD AUTO: 0.14 X10(3) UL (ref 0–0.7)
EOSINOPHIL NFR BLD AUTO: 3.2 %
ERYTHROCYTE [DISTWIDTH] IN BLOOD BY AUTOMATED COUNT: 12.2 %
FASTING STATUS PATIENT QL REPORTED: YES
GLOBULIN PLAS-MCNC: 2.2 G/DL (ref 2–3.5)
GLUCOSE BLD-MCNC: 113 MG/DL (ref 70–99)
HCT VFR BLD AUTO: 30.7 %
HGB BLD-MCNC: 10.8 G/DL
IMM GRANULOCYTES # BLD AUTO: 0.01 X10(3) UL (ref 0–1)
IMM GRANULOCYTES NFR BLD: 0.2 %
IRON SATN MFR SERPL: 14 %
IRON SERPL-MCNC: 42 UG/DL
LYMPHOCYTES # BLD AUTO: 0.92 X10(3) UL (ref 1–4)
LYMPHOCYTES NFR BLD AUTO: 21.3 %
MCH RBC QN AUTO: 34.3 PG (ref 26–34)
MCHC RBC AUTO-ENTMCNC: 35.2 G/DL (ref 31–37)
MCV RBC AUTO: 97.5 FL
MONOCYTES # BLD AUTO: 0.4 X10(3) UL (ref 0.1–1)
MONOCYTES NFR BLD AUTO: 9.3 %
NEUTROPHILS # BLD AUTO: 2.83 X10 (3) UL (ref 1.5–7.7)
NEUTROPHILS # BLD AUTO: 2.83 X10(3) UL (ref 1.5–7.7)
NEUTROPHILS NFR BLD AUTO: 65.8 %
OSMOLALITY SERPL CALC.SUM OF ELEC: 286 MOSM/KG (ref 275–295)
PLATELET # BLD AUTO: 198 10(3)UL (ref 150–450)
POTASSIUM SERPL-SCNC: 3.7 MMOL/L (ref 3.5–5.1)
PROT SERPL-MCNC: 6.3 G/DL (ref 5.7–8.2)
RBC # BLD AUTO: 3.15 X10(6)UL
SODIUM SERPL-SCNC: 138 MMOL/L (ref 136–145)
TOTAL IRON BINDING CAPACITY: 307 UG/DL (ref 250–425)
TRANSFERRIN SERPL-MCNC: 220 MG/DL (ref 215–365)
WBC # BLD AUTO: 4.3 X10(3) UL (ref 4–11)

## 2024-10-23 PROCEDURE — 83540 ASSAY OF IRON: CPT | Performed by: FAMILY MEDICINE

## 2024-10-23 PROCEDURE — 80053 COMPREHEN METABOLIC PANEL: CPT | Performed by: FAMILY MEDICINE

## 2024-10-23 PROCEDURE — 82728 ASSAY OF FERRITIN: CPT | Performed by: FAMILY MEDICINE

## 2024-10-23 PROCEDURE — 83550 IRON BINDING TEST: CPT | Performed by: FAMILY MEDICINE

## 2024-10-23 PROCEDURE — 85025 COMPLETE CBC W/AUTO DIFF WBC: CPT | Performed by: FAMILY MEDICINE

## 2024-10-23 NOTE — PAYOR COMM NOTE
--------------  DISCHARGE REVIEW    Payor: RACHELE BOWMAN/ULICES  Subscriber #:  [id number]  Authorization Number: [id number]    Admit date: 10/20/24  Admit time:   9:59 AM  Discharge Date: 10/22/2024  3:42 PM     Admitting Physician: Jillian Garcia MD  Attending Physician:  No att. providers found  Primary Care Physician: Jes Floyd MD

## 2024-10-24 NOTE — DISCHARGE SUMMARY
[facility]     Raffaele Patton Patient Status:  Inpatient    8/10/1960 MRN [id number]   Location [facility] 3SW-A Attending No att. providers found   Hosp Day # 2 PCP Jes Floyd MD     Date of Admission: 10/20/2024  Date of Discharge:  10/22/2024     Discharge Disposition: Home or Self Care    Discharge Diagnosis:  Upper GI bleed  Acute blood loss anemia  Weight loss  Syncope  LUTS  Hypertension     History of Present Illness:   64 year old male with history of hypertension, anxiety presented with \"black diarrhea\" and syncope.      Patient reports decreased appetite and about a 15 pound weight loss over the past 2 months.  He developed once a day melenic stools over the past 3 to 4 days.  Last night he had multiple loose melenic stools.  Today morning he had presyncopal symptoms and palpitations so he called the paramedics.  Denies any NSAID use.  No prior history of GI bleeds.     He was tachycardic to 155 and hypotensive to 82/73 on arrival to the ED. HR and BP improved with IVF. BUN 36. Hgb 14.6 (17.3 in 2024). IV protonix administered. GI consulted. EKG sinus tachycardia and no ST-T wave changes concerning for ACS.    Brief Synopsis:   #Suspected upper GIB  #Weight loss, change in appetite  BUN elevated 36. Coags wnl. Hgb 14.6 on arrival. S/p EGD 10/21 with gastric ulcer, esophageal ulcer s/p clip x 2. Diet was advanced. Patient to continue protonix 40 mg BID. GI follow up for repeat EGD in 8 weeks.     #LUTS - started flomax. Urology referral at discharge. UA negative for infection    Lace+ Score: 52  59-90 High Risk  29-58 Medium Risk  0-28   Low Risk       TCM Follow-Up Recommendation:  LACE 29-58: Moderate Risk of readmission after discharge from the hospital.      Procedures during hospitalization:   EGD    Incidental or significant findings and recommendations (brief descriptions):  NA    Lab/Test results pending at Discharge:    NA    Consultants:  Gastroenterology     Discharge Medication List:     Discharge Medications        START taking these medications        Instructions Prescription details   pantoprazole 40 MG Tbec  Commonly known as: Protonix      Take 1 tablet (40 mg total) by mouth 2 (two) times daily before meals.   Quantity: 60 tablet  Refills: 3     tamsulosin 0.4 MG Caps  Commonly known as: Flomax      Take 1 capsule (0.4 mg total) by mouth daily.   Stop taking on: November 21, 2024  Quantity: 30 capsule  Refills: 0            CHANGE how you take these medications        Instructions Prescription details   lisinopril 20 MG Tabs  Commonly known as: Prinivil; Zestril  Start taking on: October 28, 2024  What changed: These instructions start on October 28, 2024. If you are unsure what to do until then, ask your doctor or other care provider.      Take 1 tablet (20 mg total) by mouth daily.   Quantity: 90 tablet  Refills: 1            CONTINUE taking these medications        Instructions Prescription details   ALPRAZolam 0.5 MG Tabs  Commonly known as: Xanax      TAKE 1 TABLET BY MOUTH TWICE DAILY AS NEEDED   Quantity: 60 tablet  Refills: 5               Where to Get Your Medications        These medications were sent to [id number] - [facility] [address] AT SEC OF [address], [phone], [phone] [address] [place] [id number]      Phone: [phone]   lisinopril 20 MG Tabs  pantoprazole 40 MG Tbec  tamsulosin 0.4 MG Caps         [place]    Follow-up appointment:   Jes Floyd MD  [address]  [facility] [address] [phone]    Schedule an appointment as soon as possible for a visit in 1 week(s)      Josue Jo MD  [address]  [facility] [address] [phone]    Call  repeat EGD 8 weeks    Arsen Ramon MD  [address]  [facility] [address] [phone]    Call in 1 week(s)  chronic lower urinary tract  symptoms    Appointments for Next 30 Days 10/24/2024 - 2024        Date Arrival Time Visit Type Length Department Provider     10/28/2024  9:40 AM  [facility] FOLLOW UP [1510] 20 min [facility] [address] [place] Jes Floyd MD    Patient Instructions:         Location Instructions:     Masks are optional for all patients and visitors, unless otherwise indicated.                      Vital signs:       Physical Exam:    General: No acute distress   Lungs: clear to auscultation  Cardiovascular: S1, S2  Abdomen: Soft    -----------------------------------------------------------------------------------------------  PATIENT DISCHARGE INSTRUCTIONS: See electronic chart    Jillian Garcia MD    Total time spent on discharge plannin minutes     The  [employer] C[employer] Act makes medical notes like these available to patients in the interest of transparency. Please be advised this is a medical document. Medical documents are intended to carry relevant information, facts as evident, and the clinical opinion of the practitioner. The medical note is intended as peer to peer communication and may appear blunt or direct. It is written in medical language and may contain abbreviations or verbiage that are unfamiliar.

## 2024-10-28 ENCOUNTER — OFFICE VISIT (OUTPATIENT)
Dept: FAMILY MEDICINE CLINIC | Facility: CLINIC | Age: 64
End: 2024-10-28
Payer: COMMERCIAL

## 2024-10-28 ENCOUNTER — LAB ENCOUNTER (OUTPATIENT)
Dept: LAB | Age: 64
End: 2024-10-28
Attending: FAMILY MEDICINE
Payer: COMMERCIAL

## 2024-10-28 VITALS
SYSTOLIC BLOOD PRESSURE: 112 MMHG | OXYGEN SATURATION: 99 % | DIASTOLIC BLOOD PRESSURE: 80 MMHG | HEIGHT: 71 IN | WEIGHT: 212 LBS | BODY MASS INDEX: 29.68 KG/M2 | HEART RATE: 70 BPM

## 2024-10-28 DIAGNOSIS — K25.0 ACUTE GASTRIC ULCER WITH HEMORRHAGE: Primary | ICD-10-CM

## 2024-10-28 LAB
ADENOVIRUS F 40/41 PCR: NEGATIVE
ASTROVIRUS PCR: NEGATIVE
C CAYETANENSIS DNA SPEC QL NAA+PROBE: NEGATIVE
CAMPY SP DNA.DIARRHEA STL QL NAA+PROBE: NEGATIVE
CRYPTOSP DNA SPEC QL NAA+PROBE: NEGATIVE
EAEC PAA PLAS AGGR+AATA ST NAA+NON-PRB: NEGATIVE
EC STX1+STX2 + H7 FLIC SPEC NAA+PROBE: NEGATIVE
ENTAMOEBA HISTOLYTICA PCR: NEGATIVE
EPEC EAE GENE STL QL NAA+NON-PROBE: NEGATIVE
ETEC LTA+ST1A+ST1B TOX ST NAA+NON-PROBE: NEGATIVE
GIARDIA LAMBLIA PCR: NEGATIVE
NOROVIRUS GI/GII PCR: NEGATIVE
P SHIGELLOIDES DNA STL QL NAA+PROBE: NEGATIVE
ROTAVIRUS A PCR: NEGATIVE
SALMONELLA DNA SPEC QL NAA+PROBE: NEGATIVE
SAPOVIRUS PCR: NEGATIVE
SHIGELLA SP+EIEC IPAH ST NAA+NON-PROBE: NEGATIVE
V CHOLERAE DNA SPEC QL NAA+PROBE: NEGATIVE
VIBRIO DNA SPEC NAA+PROBE: NEGATIVE
YERSINIA DNA SPEC NAA+PROBE: NEGATIVE

## 2024-10-28 NOTE — PROGRESS NOTES
Patient went in for hospital follow-up appointment with primary care provider on 10/28/24.  Encounter closing.

## 2024-10-28 NOTE — PROGRESS NOTES
Subjective:   Raffaele Patton is a 64 year old male who presents for hospital follow up.   He was discharged from [facility] to Home or Self Care  Admission Date: 10/20/24   Discharge Date: 10/22/24  Hospital Discharge Diagnosis: Upper GI bleed.    Interactive contact within 2 business days post discharge first initiated on Date: 10/23/2024    During the visit, the following was completed:  Obtained and reviewed discharge summary, continuity of care documents, Hospitalist notes, Surgery Notes, and GI notes  Reviewed Labs (CBC, CMP), Labs (Cardiac markers), Labs (Pathology), Labs (Microbiology), CT radiology results, X-Ray radiology results, and EKG    HPI:     Pt feels better, no epigastric pain, still tired.  Got dark stools again, HgB getting better, got another EGD and colonoscopy in 3 weeks.  No poor apatite,  swallowing issues, heartburn or abdominal pain.      History/Other:   Current Medications:  Medication Reconciliation:  I am aware of an inpatient discharge within the last 30 days.  The discharge medication list has been reconciled with the patient's current medication list and reviewed by me.  See medication list for additions of new medication, and changes to current doses of medications and discontinued medications.  Outpatient Medications Marked as Taking for the 10/28/24 encounter (Office Visit) with Jes Floyd MD   Medication Sig    ciprofloxacin (CIPRO) 500 MG Oral Tab Take 1 tablet (500 mg total) by mouth 2 (two) times daily. For 5 days    lisinopril 20 MG Oral Tab Take 1 tablet (20 mg total) by mouth daily.    pantoprazole 40 MG Oral Tab EC Take 1 tablet (40 mg total) by mouth 2 (two) times daily before meals.    tamsulosin 0.4 MG Oral Cap Take 1 capsule (0.4 mg total) by mouth daily.       Review of Systems:  GENERAL: weight stable, energy stable, no sweating  SKIN: denies any unusual skin lesions  EYES: denies blurred vision or double vision  HEENT: denies nasal congestion, sinus pain  or ST  LUNGS: denies shortness of breath with exertion  CARDIOVASCULAR: denies chest pain on exertion or palpitations  GI: denies abdominal pain, denies heartburn, denies diarrhea  MUSCULOSKELETAL: denies pain, normal range of motion of extremities  NEURO: denies headaches, denies dizziness, denies weakness  PSYCHE: denies depression or anxiety  HEMATOLOGIC: denies hx of anemia, or bruising, denies bleeding  ENDOCRINE: denies thyroid history  ALL/ASTHMA: denies hx of allergy or asthma    Objective:   No LMP for male patient.  Estimated body mass index is 29.57 kg/m² as calculated from the following:    Height as of this encounter: 5' 11\" (1.803 m).    Weight as of this encounter: 212 lb (96.2 kg).   /80   Pulse 70   Ht 5' 11\" (1.803 m)   Wt 212 lb (96.2 kg)   SpO2 99%   BMI 29.57 kg/m²    GENERAL: well developed, well nourished, in no apparent distress  SKIN: no rashes, no suspicious lesions  HEENT: atraumatic, normocephalic, ears and throat are clear  EYES: PERRLA, EOMI, conjunctiva are clear  NECK: supple, no adenopathy, no bruits  CHEST: no chest tenderness  LUNGS: clear to auscultation  CARDIO: RRR without murmur  GI: good BS's, no masses, HSM or tenderness  MUSCULOSKELETAL: back is not tender, FROM of the extremities  EXTREMITIES: no cyanosis, clubbing or edema  NEURO: Oriented times three, cranial nerves are intact, motor and sensory are grossly intact    Assessment & Plan:   1. Acute gastric ulcer with hemorrhage (Primary)  -     Comp Metabolic Panel (14); Future; Expected date: 10/28/2024  -     CBC With Differential With Platelet; Future; Expected date: 10/28/2024  -     H. Pylori Culture (gastric); Future; Expected date: 10/28/2024        Return in 4 weeks (on 11/25/2024).

## 2024-10-29 ENCOUNTER — PATIENT MESSAGE (OUTPATIENT)
Dept: FAMILY MEDICINE CLINIC | Facility: CLINIC | Age: 64
End: 2024-10-29

## 2024-10-29 ENCOUNTER — LAB ENCOUNTER (OUTPATIENT)
Dept: LAB | Age: 64
End: 2024-10-29
Attending: FAMILY MEDICINE
Payer: COMMERCIAL

## 2024-10-29 ENCOUNTER — HOSPITAL ENCOUNTER (EMERGENCY)
Age: 64
Discharge: HOME | End: 2024-10-29
Attending: EMERGENCY MEDICINE
Payer: COMMERCIAL

## 2024-10-29 VITALS
WEIGHT: 212 LBS | BODY MASS INDEX: 29.68 KG/M2 | OXYGEN SATURATION: 100 % | HEART RATE: 69 BPM | HEIGHT: 71 IN | SYSTOLIC BLOOD PRESSURE: 132 MMHG | RESPIRATION RATE: 18 BRPM | TEMPERATURE: 98 F | DIASTOLIC BLOOD PRESSURE: 87 MMHG

## 2024-10-29 DIAGNOSIS — K25.0 ACUTE GASTRIC ULCER WITH HEMORRHAGE: ICD-10-CM

## 2024-10-29 DIAGNOSIS — R53.1 WEAKNESS GENERALIZED: Primary | ICD-10-CM

## 2024-10-29 LAB
ALBUMIN SERPL-MCNC: 3.4 G/DL (ref 3.4–5)
ALBUMIN SERPL-MCNC: 4.2 G/DL (ref 3.2–4.8)
ALBUMIN/GLOB SERPL: 1.1 {RATIO} (ref 1–2)
ALBUMIN/GLOB SERPL: 1.8 {RATIO} (ref 1–2)
ALP LIVER SERPL-CCNC: 63 U/L
ALP LIVER SERPL-CCNC: 63 U/L
ALT SERPL-CCNC: 36 U/L
ALT SERPL-CCNC: 43 U/L
ANION GAP SERPL CALC-SCNC: 6 MMOL/L (ref 0–18)
ANION GAP SERPL CALC-SCNC: 7 MMOL/L (ref 0–18)
ANTIBODY SCREEN: NEGATIVE
AST SERPL-CCNC: 26 U/L (ref 15–37)
AST SERPL-CCNC: 33 U/L (ref ?–34)
BASOPHILS # BLD AUTO: 0.01 X10(3) UL (ref 0–0.2)
BASOPHILS # BLD AUTO: 0.02 X10(3) UL (ref 0–0.2)
BASOPHILS NFR BLD AUTO: 0.3 %
BASOPHILS NFR BLD AUTO: 0.4 %
BILIRUB SERPL-MCNC: 0.3 MG/DL (ref 0.1–2)
BILIRUB SERPL-MCNC: 0.3 MG/DL (ref 0.2–1.1)
BILIRUB UR QL STRIP.AUTO: NEGATIVE
BUN BLD-MCNC: 10 MG/DL (ref 9–23)
BUN BLD-MCNC: 11 MG/DL (ref 9–23)
CALCIUM BLD-MCNC: 8.7 MG/DL (ref 8.5–10.1)
CALCIUM BLD-MCNC: 9.4 MG/DL (ref 8.7–10.4)
CHLORIDE SERPL-SCNC: 109 MMOL/L (ref 98–112)
CHLORIDE SERPL-SCNC: 110 MMOL/L (ref 98–112)
CLARITY UR REFRACT.AUTO: CLEAR
CO2 SERPL-SCNC: 23 MMOL/L (ref 21–32)
CO2 SERPL-SCNC: 24 MMOL/L (ref 21–32)
COLOR UR AUTO: YELLOW
CREAT BLD-MCNC: 1 MG/DL
CREAT BLD-MCNC: 1.02 MG/DL
EGFRCR SERPLBLD CKD-EPI 2021: 82 ML/MIN/1.73M2 (ref 60–?)
EGFRCR SERPLBLD CKD-EPI 2021: 84 ML/MIN/1.73M2 (ref 60–?)
EOSINOPHIL # BLD AUTO: 0.1 X10(3) UL (ref 0–0.7)
EOSINOPHIL # BLD AUTO: 0.11 X10(3) UL (ref 0–0.7)
EOSINOPHIL NFR BLD AUTO: 2 %
EOSINOPHIL NFR BLD AUTO: 3 %
ERYTHROCYTE [DISTWIDTH] IN BLOOD BY AUTOMATED COUNT: 13 %
ERYTHROCYTE [DISTWIDTH] IN BLOOD BY AUTOMATED COUNT: 13.1 %
FASTING STATUS PATIENT QL REPORTED: NO
GLOBULIN PLAS-MCNC: 2.4 G/DL (ref 2–3.5)
GLOBULIN PLAS-MCNC: 3.2 G/DL (ref 2.8–4.4)
GLUCOSE BLD-MCNC: 103 MG/DL (ref 70–99)
GLUCOSE BLD-MCNC: 99 MG/DL (ref 70–99)
GLUCOSE UR STRIP.AUTO-MCNC: NEGATIVE MG/DL
HCT VFR BLD AUTO: 32.2 %
HCT VFR BLD AUTO: 33.4 %
HGB BLD-MCNC: 11.1 G/DL
HGB BLD-MCNC: 11.3 G/DL
HYALINE CASTS #/AREA URNS AUTO: PRESENT /LPF
IMM GRANULOCYTES # BLD AUTO: 0.01 X10(3) UL (ref 0–1)
IMM GRANULOCYTES # BLD AUTO: 0.02 X10(3) UL (ref 0–1)
IMM GRANULOCYTES NFR BLD: 0.3 %
IMM GRANULOCYTES NFR BLD: 0.4 %
KETONES UR STRIP.AUTO-MCNC: 40 MG/DL
LEUKOCYTE ESTERASE UR QL STRIP.AUTO: NEGATIVE
LYMPHOCYTES # BLD AUTO: 0.75 X10(3) UL (ref 1–4)
LYMPHOCYTES # BLD AUTO: 0.78 X10(3) UL (ref 1–4)
LYMPHOCYTES NFR BLD AUTO: 15.7 %
LYMPHOCYTES NFR BLD AUTO: 20.7 %
MCH RBC QN AUTO: 34 PG (ref 26–34)
MCH RBC QN AUTO: 34 PG (ref 26–34)
MCHC RBC AUTO-ENTMCNC: 33.8 G/DL (ref 31–37)
MCHC RBC AUTO-ENTMCNC: 34.5 G/DL (ref 31–37)
MCV RBC AUTO: 100.6 FL
MCV RBC AUTO: 98.8 FL
MONOCYTES # BLD AUTO: 0.41 X10(3) UL (ref 0.1–1)
MONOCYTES # BLD AUTO: 0.42 X10(3) UL (ref 0.1–1)
MONOCYTES NFR BLD AUTO: 11.6 %
MONOCYTES NFR BLD AUTO: 8.3 %
NEUTROPHILS # BLD AUTO: 2.33 X10 (3) UL (ref 1.5–7.7)
NEUTROPHILS # BLD AUTO: 2.33 X10(3) UL (ref 1.5–7.7)
NEUTROPHILS # BLD AUTO: 3.63 X10 (3) UL (ref 1.5–7.7)
NEUTROPHILS # BLD AUTO: 3.63 X10(3) UL (ref 1.5–7.7)
NEUTROPHILS NFR BLD AUTO: 64.1 %
NEUTROPHILS NFR BLD AUTO: 73.2 %
NITRITE UR QL STRIP.AUTO: NEGATIVE
OSMOLALITY SERPL CALC.SUM OF ELEC: 287 MOSM/KG (ref 275–295)
OSMOLALITY SERPL CALC.SUM OF ELEC: 290 MOSM/KG (ref 275–295)
PH UR STRIP.AUTO: 7.5 [PH] (ref 5–8)
PLATELET # BLD AUTO: 309 10(3)UL (ref 150–450)
PLATELET # BLD AUTO: 314 10(3)UL (ref 150–450)
POTASSIUM SERPL-SCNC: 3.7 MMOL/L (ref 3.5–5.1)
POTASSIUM SERPL-SCNC: 3.8 MMOL/L (ref 3.5–5.1)
PROT SERPL-MCNC: 6.6 G/DL (ref 5.7–8.2)
PROT SERPL-MCNC: 6.6 G/DL (ref 6.4–8.2)
RBC # BLD AUTO: 3.26 X10(6)UL
RBC # BLD AUTO: 3.32 X10(6)UL
RH BLOOD TYPE: POSITIVE
SODIUM SERPL-SCNC: 139 MMOL/L (ref 136–145)
SODIUM SERPL-SCNC: 140 MMOL/L (ref 136–145)
SP GR UR STRIP.AUTO: 1.02 (ref 1–1.03)
UROBILINOGEN UR STRIP.AUTO-MCNC: 0.2 MG/DL
WBC # BLD AUTO: 3.6 X10(3) UL (ref 4–11)
WBC # BLD AUTO: 5 X10(3) UL (ref 4–11)

## 2024-10-29 PROCEDURE — 99283 EMERGENCY DEPT VISIT LOW MDM: CPT

## 2024-10-29 PROCEDURE — 36415 COLL VENOUS BLD VENIPUNCTURE: CPT

## 2024-10-29 PROCEDURE — 86850 RBC ANTIBODY SCREEN: CPT | Performed by: EMERGENCY MEDICINE

## 2024-10-29 PROCEDURE — 80053 COMPREHEN METABOLIC PANEL: CPT | Performed by: FAMILY MEDICINE

## 2024-10-29 PROCEDURE — 81015 MICROSCOPIC EXAM OF URINE: CPT | Performed by: EMERGENCY MEDICINE

## 2024-10-29 PROCEDURE — 85025 COMPLETE CBC W/AUTO DIFF WBC: CPT | Performed by: EMERGENCY MEDICINE

## 2024-10-29 PROCEDURE — 99284 EMERGENCY DEPT VISIT MOD MDM: CPT

## 2024-10-29 PROCEDURE — 85025 COMPLETE CBC W/AUTO DIFF WBC: CPT | Performed by: FAMILY MEDICINE

## 2024-10-29 PROCEDURE — 86901 BLOOD TYPING SEROLOGIC RH(D): CPT | Performed by: EMERGENCY MEDICINE

## 2024-10-29 PROCEDURE — 81001 URINALYSIS AUTO W/SCOPE: CPT | Performed by: EMERGENCY MEDICINE

## 2024-10-29 PROCEDURE — 80053 COMPREHEN METABOLIC PANEL: CPT | Performed by: EMERGENCY MEDICINE

## 2024-10-29 PROCEDURE — 93005 ELECTROCARDIOGRAM TRACING: CPT

## 2024-10-29 PROCEDURE — 86900 BLOOD TYPING SEROLOGIC ABO: CPT | Performed by: EMERGENCY MEDICINE

## 2024-10-29 PROCEDURE — 93010 ELECTROCARDIOGRAM REPORT: CPT

## 2024-10-30 LAB
ATRIAL RATE: 74 BPM
P AXIS: 34 DEGREES
P-R INTERVAL: 198 MS
Q-T INTERVAL: 372 MS
QRS DURATION: 88 MS
QTC CALCULATION (BEZET): 412 MS
R AXIS: -26 DEGREES
T AXIS: 24 DEGREES
VENTRICULAR RATE: 74 BPM

## 2024-10-30 NOTE — ED INITIAL ASSESSMENT (HPI)
Patient to ER with black tarry stools since Tuesday. Recently discharged from [facility] on Tuesday, upper scope done, ulcers that were found were \"clipped\" per patient. Patient with c/o dizziness and nausea today.

## 2024-10-30 NOTE — ED PROVIDER NOTES
Patient Seen in: [facility] In [place]      History     Chief Complaint   Patient presents with    GI Bleeding     Stated Complaint: Pt recently dishcarged from hospital for suspected GI bleed, patient has re-dev*    Subjective:   HPI      64-year-old male who comes to the emergency room stating he is feeling a bit weak and dizzy.  The patient was in the hospital on Saturday and admitted for a GI bleed.  They did an EGD where they clipped an area of his ulcer although they state that he was having no active bleeding at the time.  The patient went home and yesterday he had 2 black stools.  He has had no bowel movements today.  He is denying any headaches.  No chest pain.  Not short of breath.  He is denying significant abdominal pain at this time.  He is denying any other complaints.  He is concerned that he is bleeding again.    Objective:     Past Medical History:    Anxiety    Essential hypertension    H. pylori infection    High blood pressure              Past Surgical History:   Procedure Laterality Date    Repair ing hernia,5+y/o,reducibl                  Social History     Socioeconomic History    Marital status:    Tobacco Use    Smoking status: Former     Current packs/day: 0.00     Average packs/day: 2.0 packs/day for 8.0 years (16.0 ttl pk-yrs)     Types: Cigarettes     Start date: 1978     Quit date: 1986     Years since quittin.8    Smokeless tobacco: Never   Vaping Use    Vaping status: Never Used   Substance and Sexual Activity    Alcohol use: Yes     Alcohol/week: 10.0 - 12.0 standard drinks of alcohol     Types: 10 - 12 Standard drinks or equivalent per week     Comment: twice a week    Drug use: No   Other Topics Concern    Caffeine Concern No    Exercise No    Seat Belt Yes     Social Drivers of Health     Food Insecurity: No Food Insecurity (10/20/2024)    Food Insecurity     Food Insecurity: Never true   Transportation Needs: No Transportation Needs  (10/20/2024)    Transportation Needs     Lack of Transportation: No   Housing Stability: Low Risk  (10/20/2024)    Housing Stability     Housing Instability: No                  Physical Exam     ED Triage Vitals [10/29/24 2002]   BP (!) 147/91   Pulse 72   Resp 18   Temp 98.1 °F (36.7 °C)   Temp src Oral   SpO2 100 %   O2 Device None (Room air)       Current Vitals:   Vital Signs  BP: (!) 147/91  Pulse: 72  Resp: 18  Temp: 98.1 °F (36.7 °C)  Temp src: Oral    Oxygen Therapy  SpO2: 100 %  O2 Device: None (Room air)        Physical Exam  HEENT; NCAT, EOMI, throat clear, neck supple, no LAD, no JVD  Heart S1S2 RRR  lungs: CTAB  abd: Soft NT, ND,  NABS without rebound or guarding  Ext no C/C/E    ED Course     Labs Reviewed   CBC WITH DIFFERENTIAL WITH PLATELET - Abnormal; Notable for the following components:       Result Value    RBC 3.26 (*)     HGB 11.1 (*)     HCT 32.2 (*)     Lymphocyte Absolute 0.78 (*)     All other components within normal limits   URINALYSIS WITH CULTURE REFLEX - Abnormal; Notable for the following components:    Ketones Urine 40 (*)     Blood Urine Trace-Intact (*)     Protein Urine Trace (*)     All other components within normal limits   COMP METABOLIC PANEL (14) - Normal   UA MICROSCOPIC ONLY, URINE   TYPE AND SCREEN    Narrative:     The following orders were created for panel order Type and screen.  Procedure                               Abnormality         Status                     ---------                               -----------         ------                     ABORH (Blood Type)[[id number]]                               Final result               Antibody Screen[[id number]]                                  Final result                 Please view results for these tests on the individual orders.   ABORH (BLOOD TYPE)   ANTIBODY SCREEN     EKG    Rate, intervals and axes as noted on EKG Report.  Rate: 74  Rhythm: Sinus Rhythm  Reading: , QRS of 88, patient is normal sinus  rhythm without acute ischemic change.           ED Course as of 10/29/24 2149  ------------------------------------------------------------  Time: 10/29 2148  Comment: While here the patient's urinalysis is consistent with some dehydration.  The patient's electrolytes were normal.  The patient's hemoglobin was 11.1 which is improved from prior which was 10.8.  He has had no bowel movements in the last 24 hours inconsistent with a GI bleed at this time.       Medications - No data to display         MDM      Differential diagnosis included GI bleed, anemia but not limited such.  The patient has not had no bowel movement in last 24 hours and his hemoglobin has improved from prior which is inconsistent with an active GI bleed.  At this time the patient will be discharged home to follow-up with his gastroenterologist and physician.    Patient was screened and evaluated during this visit.   As a treating physician attending to the patient, I determined, within reasonable clinical confidence and prior to discharge, that an emergency medical condition was not or was no longer present.  There was no indication for further evaluation, treatment or admission on an emergency basis.       The usual and customary discharge instuctions were discussed given the patient's ER course.  We discussed signs and symptoms that should prompt the patient's immediate return to the emergency department.   Reasonable over the counter and prescription treatment options and Physician follow up plan was discussed.       The patient is discharged in good condition.   This note was prepared using Dragon Medical voice recognition dictation software.  As a result errors may occur.  When identified to these areas have been corrected.  While every attempt is made to correct errors during dictation discrepancies may still exist.  Please contact if there are any errors.          Medical Decision Making      Disposition and Plan     Clinical  Impression:  1. Weakness generalized         Disposition:  Discharge  10/29/2024  9:49 pm    Follow-up:  Josue Jo MD  [address] [address] [address]  [phone]    Schedule an appointment as soon as possible for a visit in 1 day(s)            Medications Prescribed:  Current Discharge Medication List              Supplementary Documentation:

## 2024-10-31 ENCOUNTER — HOSPITAL ENCOUNTER (OUTPATIENT)
Dept: LAB | Facility: HOSPITAL | Age: 64
Discharge: HOME | End: 2024-10-31
Attending: INTERNAL MEDICINE
Payer: COMMERCIAL

## 2024-10-31 DIAGNOSIS — K25.3 ACUTE GASTRIC ULCER WITHOUT HEMORRHAGE OR PERFORATION: ICD-10-CM

## 2024-10-31 DIAGNOSIS — K22.11 ULCER OF ESOPHAGUS WITH BLEEDING: ICD-10-CM

## 2024-10-31 PROBLEM — R42 LIGHTHEADEDNESS: Status: ACTIVE | Noted: 2024-05-30

## 2024-10-31 LAB
BASOPHILS # BLD AUTO: 0.03 X10(3) UL (ref 0–0.2)
BASOPHILS NFR BLD AUTO: 0.7 %
EOSINOPHIL # BLD AUTO: 0.2 X10(3) UL (ref 0–0.7)
EOSINOPHIL NFR BLD AUTO: 4.4 %
ERYTHROCYTE [DISTWIDTH] IN BLOOD BY AUTOMATED COUNT: 13 %
HCT VFR BLD AUTO: 34 %
HGB BLD-MCNC: 11.4 G/DL
IMM GRANULOCYTES # BLD AUTO: 0.01 X10(3) UL (ref 0–1)
IMM GRANULOCYTES NFR BLD: 0.2 %
LYMPHOCYTES # BLD AUTO: 1.17 X10(3) UL (ref 1–4)
LYMPHOCYTES NFR BLD AUTO: 25.9 %
MCH RBC QN AUTO: 33.6 PG (ref 26–34)
MCHC RBC AUTO-ENTMCNC: 33.5 G/DL (ref 31–37)
MCV RBC AUTO: 100.3 FL
MONOCYTES # BLD AUTO: 0.47 X10(3) UL (ref 0.1–1)
MONOCYTES NFR BLD AUTO: 10.4 %
NEUTROPHILS # BLD AUTO: 2.63 X10 (3) UL (ref 1.5–7.7)
NEUTROPHILS # BLD AUTO: 2.63 X10(3) UL (ref 1.5–7.7)
NEUTROPHILS NFR BLD AUTO: 58.4 %
PLATELET # BLD AUTO: 304 10(3)UL (ref 150–450)
RBC # BLD AUTO: 3.39 X10(6)UL
WBC # BLD AUTO: 4.5 X10(3) UL (ref 4–11)

## 2024-10-31 PROCEDURE — 85025 COMPLETE CBC W/AUTO DIFF WBC: CPT

## 2024-10-31 PROCEDURE — 36415 COLL VENOUS BLD VENIPUNCTURE: CPT

## 2024-11-01 ENCOUNTER — APPOINTMENT (OUTPATIENT)
Dept: CT IMAGING | Age: 64
End: 2024-11-01
Attending: EMERGENCY MEDICINE
Payer: COMMERCIAL

## 2024-11-01 ENCOUNTER — HOSPITAL ENCOUNTER (EMERGENCY)
Age: 64
Discharge: HOME | End: 2024-11-01
Attending: EMERGENCY MEDICINE
Payer: COMMERCIAL

## 2024-11-01 VITALS
HEART RATE: 78 BPM | TEMPERATURE: 98 F | RESPIRATION RATE: 16 BRPM | SYSTOLIC BLOOD PRESSURE: 142 MMHG | DIASTOLIC BLOOD PRESSURE: 92 MMHG | OXYGEN SATURATION: 99 %

## 2024-11-01 DIAGNOSIS — K59.00 CONSTIPATION, UNSPECIFIED CONSTIPATION TYPE: Primary | ICD-10-CM

## 2024-11-01 LAB
ALBUMIN SERPL-MCNC: 3.3 G/DL (ref 3.4–5)
ALBUMIN/GLOB SERPL: 1.1 {RATIO} (ref 1–2)
ALP LIVER SERPL-CCNC: 66 U/L
ALT SERPL-CCNC: 42 U/L
ANION GAP SERPL CALC-SCNC: 5 MMOL/L (ref 0–18)
AST SERPL-CCNC: 26 U/L (ref 15–37)
BASOPHILS # BLD AUTO: 0.02 X10(3) UL (ref 0–0.2)
BASOPHILS NFR BLD AUTO: 0.3 %
BILIRUB SERPL-MCNC: 0.4 MG/DL (ref 0.1–2)
BILIRUB UR QL STRIP.AUTO: NEGATIVE
BUN BLD-MCNC: 13 MG/DL (ref 9–23)
CALCIUM BLD-MCNC: 8.5 MG/DL (ref 8.5–10.1)
CHLORIDE SERPL-SCNC: 112 MMOL/L (ref 98–112)
CLARITY UR REFRACT.AUTO: CLEAR
CO2 SERPL-SCNC: 23 MMOL/L (ref 21–32)
COLOR UR AUTO: YELLOW
CREAT BLD-MCNC: 0.92 MG/DL
EGFRCR SERPLBLD CKD-EPI 2021: 93 ML/MIN/1.73M2 (ref 60–?)
EOSINOPHIL # BLD AUTO: 0.08 X10(3) UL (ref 0–0.7)
EOSINOPHIL NFR BLD AUTO: 1.3 %
ERYTHROCYTE [DISTWIDTH] IN BLOOD BY AUTOMATED COUNT: 12.9 %
GLOBULIN PLAS-MCNC: 3 G/DL (ref 2.8–4.4)
GLUCOSE BLD-MCNC: 101 MG/DL (ref 70–99)
GLUCOSE UR STRIP.AUTO-MCNC: NEGATIVE MG/DL
HCT VFR BLD AUTO: 33.2 %
HGB BLD-MCNC: 11.6 G/DL
IMM GRANULOCYTES # BLD AUTO: 0.02 X10(3) UL (ref 0–1)
IMM GRANULOCYTES NFR BLD: 0.3 %
KETONES UR STRIP.AUTO-MCNC: 40 MG/DL
LEUKOCYTE ESTERASE UR QL STRIP.AUTO: NEGATIVE
LYMPHOCYTES # BLD AUTO: 0.51 X10(3) UL (ref 1–4)
LYMPHOCYTES NFR BLD AUTO: 8.4 %
MCH RBC QN AUTO: 34.2 PG (ref 26–34)
MCHC RBC AUTO-ENTMCNC: 34.9 G/DL (ref 31–37)
MCV RBC AUTO: 97.9 FL
MONOCYTES # BLD AUTO: 0.32 X10(3) UL (ref 0.1–1)
MONOCYTES NFR BLD AUTO: 5.3 %
NEUTROPHILS # BLD AUTO: 5.14 X10 (3) UL (ref 1.5–7.7)
NEUTROPHILS # BLD AUTO: 5.14 X10(3) UL (ref 1.5–7.7)
NEUTROPHILS NFR BLD AUTO: 84.4 %
NITRITE UR QL STRIP.AUTO: NEGATIVE
OSMOLALITY SERPL CALC.SUM OF ELEC: 290 MOSM/KG (ref 275–295)
PH UR STRIP.AUTO: 6 [PH] (ref 5–8)
PLATELET # BLD AUTO: 315 10(3)UL (ref 150–450)
POTASSIUM SERPL-SCNC: 3.7 MMOL/L (ref 3.5–5.1)
PROT SERPL-MCNC: 6.3 G/DL (ref 6.4–8.2)
PROT UR STRIP.AUTO-MCNC: NEGATIVE MG/DL
RBC # BLD AUTO: 3.39 X10(6)UL
RBC UR QL AUTO: NEGATIVE
SODIUM SERPL-SCNC: 140 MMOL/L (ref 136–145)
SP GR UR STRIP.AUTO: 1.01 (ref 1–1.03)
UROBILINOGEN UR STRIP.AUTO-MCNC: 0.2 MG/DL
WBC # BLD AUTO: 6.1 X10(3) UL (ref 4–11)

## 2024-11-01 PROCEDURE — 99285 EMERGENCY DEPT VISIT HI MDM: CPT

## 2024-11-01 PROCEDURE — 80053 COMPREHEN METABOLIC PANEL: CPT | Performed by: EMERGENCY MEDICINE

## 2024-11-01 PROCEDURE — 81003 URINALYSIS AUTO W/O SCOPE: CPT | Performed by: EMERGENCY MEDICINE

## 2024-11-01 PROCEDURE — 85025 COMPLETE CBC W/AUTO DIFF WBC: CPT | Performed by: EMERGENCY MEDICINE

## 2024-11-01 PROCEDURE — 74177 CT ABD & PELVIS W/CONTRAST: CPT | Performed by: EMERGENCY MEDICINE

## 2024-11-01 PROCEDURE — 96360 HYDRATION IV INFUSION INIT: CPT

## 2024-11-01 PROCEDURE — 99284 EMERGENCY DEPT VISIT MOD MDM: CPT

## 2024-11-01 RX ORDER — DOCUSATE SODIUM 100 MG/1
100 CAPSULE, LIQUID FILLED ORAL 2 TIMES DAILY
Qty: 14 CAPSULE | Refills: 0 | Status: SHIPPED | OUTPATIENT
Start: 2024-11-01 | End: 2024-11-08

## 2024-11-01 RX ORDER — POLYETHYLENE GLYCOL 3350 17 G/17G
17 POWDER, FOR SOLUTION ORAL DAILY PRN
Qty: 12 EACH | Refills: 0 | Status: SHIPPED | OUTPATIENT
Start: 2024-11-01 | End: 2024-12-01

## 2024-11-01 RX ORDER — BISACODYL 10 MG
10 SUPPOSITORY, RECTAL RECTAL DAILY
Qty: 5 SUPPOSITORY | Refills: 0 | Status: SHIPPED | OUTPATIENT
Start: 2024-11-01 | End: 2024-11-06

## 2024-11-01 NOTE — ED INITIAL ASSESSMENT (HPI)
Pt with bilateral lower abdominal pain since 10/19 with dark tarry stools. Pt has not had a BM since 10/24. Pt was seen in PED 2 days ago for this issue. Known bleeding gastric ulcers.

## 2024-11-01 NOTE — ED PROVIDER NOTES
Patient Seen in: [facility] In [place]      History     Chief Complaint   Patient presents with    Abdomen/Flank Pain     Pt with bilateral lower abdominal pain since 10/19 with dark tarry stools. Pt has not had a BM since 10/24. Pt was seen in PED 2 days ago for this issue.      Stated Complaint: ABD pain    Subjective:   HPI      64-year-old male presents for evaluation of bilateral lower abdominal pain with nausea for the past 1 week.  Patient has not had a bowel movement during this time.  He has not had any success with Dulcolax suppositories.  Recent history is that the patient was hospitalized last week for an upper GI bleed due to gastric ulcers which were clipped.  Hemoglobin postprocedure has been stable.  No history of bowel obstruction but this is what he is worried about.  No history of coagulopathy or liver disease    Objective:     Past Medical History:    Abdominal hernia    Abdominal pain    Anxiety    Black stools    Blood in the stool    Constipation    Dizziness    Easy bruising    Essential hypertension    Fatigue    Frequent UTI    H. pylori infection    Hemorrhoids    High blood pressure    Irregular bowel habits    Loss of appetite    Nausea    Uncomfortable fullness after meals              Past Surgical History:   Procedure Laterality Date    Colonoscopy      Hernia surgery  2021    Repair ing hernia,5+y/o,reducibl                  Social History     Socioeconomic History    Marital status:    Tobacco Use    Smoking status: Former     Current packs/day: 0.00     Average packs/day: 2.0 packs/day for 35.0 years (70.0 ttl pk-yrs)     Types: Cigarettes     Start date: 1978     Quit date: 1986     Years since quittin.8     Passive exposure: Past    Smokeless tobacco: Never   Vaping Use    Vaping status: Never Used   Substance and Sexual Activity    Alcohol use: Not Currently     Alcohol/week: 10.0 standard drinks of alcohol     Types: 10 Cans of  beer per week     Comment: twice a week    Drug use: No   Other Topics Concern    Caffeine Concern No    Exercise No    Seat Belt Yes     Social Drivers of Health     Food Insecurity: No Food Insecurity (10/20/2024)    Food Insecurity     Food Insecurity: Never true   Transportation Needs: No Transportation Needs (10/20/2024)    Transportation Needs     Lack of Transportation: No   Housing Stability: Low Risk  (10/20/2024)    Housing Stability     Housing Instability: No                  Physical Exam     ED Triage Vitals   BP 11/01/24 1540 (!) 150/109   Pulse 11/01/24 1539 82   Resp 11/01/24 1539 20   Temp 11/01/24 1539 98.2 °F (36.8 °C)   Temp src 11/01/24 1539 Tympanic   SpO2 11/01/24 1539 100 %   O2 Device 11/01/24 1539 None (Room air)       Current Vitals:   Vital Signs  BP: (!) 148/106  Pulse: 80  Resp: 18  Temp: 98.2 °F (36.8 °C)  Temp src: Tympanic    Oxygen Therapy  SpO2: 100 %  O2 Device: None (Room air)        Physical Exam     General: Alert, oriented, no apparent distress  HEENT:  Pupils equal reactive.  Extraocular motions intact. MMM.  Neck: Supple  Lungs: Clear to auscultation bilaterally.  Heart: Regular rate and rhythm.  Abdomen: Soft, nontender.   Skin: No rash.  No edema.  Neurologic: No focal neurologic deficits.  Normal speech pattern  Musculoskeletal: No tenderness or deformity noted.  Full range of motion throughout.      ED Course     Labs Reviewed   COMP METABOLIC PANEL (14) - Abnormal; Notable for the following components:       Result Value    Glucose 101 (*)     Total Protein 6.3 (*)     Albumin 3.3 (*)     All other components within normal limits   CBC WITH DIFFERENTIAL WITH PLATELET - Abnormal; Notable for the following components:    RBC 3.39 (*)     HGB 11.6 (*)     HCT 33.2 (*)     MCH 34.2 (*)     Lymphocyte Absolute 0.51 (*)     All other components within normal limits   URINALYSIS WITH CULTURE REFLEX - Abnormal; Notable for the following components:    Ketones Urine 40 (*)      All other components within normal limits                   MDM      Differential diagnosis includes constipation, bowel obstruction, volvulus    Chemistry and CBC are unremarkable.  Hemoglobin is stable at 11.6    CT ABDOMEN+PELVIS(CONTRAST ONLY)(CPT=74177)    Result Date: 11/1/2024  PROCEDURE:  CT ABDOMEN+PELVIS (CONTRAST ONLY) (CPT=74177)  COMPARISON:  [facility], CT, CT ABDOMEN+PELVIS(CONTRAST ONLY)(CPT=74177), 10/20/2024, 7:06 AM.  INDICATIONS:  ABD pain  TECHNIQUE:  CT scanning was performed from the dome of the diaphragm to the pubic symphysis with non-ionic intravenous contrast material. Post contrast coronal MPR imaging was performed.  Dose reduction techniques were used. Dose information is transmitted to the ACR (American College of Radiology) NRDR (National Radiology Data Registry) which includes the Dose Index Registry.  PATIENT STATED HISTORY:(As transcribed by Technologist)  Bilateral lower abdominal pain since 10/19/24. Last bowel movement 10/24/24.   CONTRAST USED:  100cc of Isovue 370  FINDINGS:  LUNG BASE:  Atelectasis. LIVER:  9 mm cyst at the dome of the left lobe of the liver.  BILIARY:  No biliary ductal dilatation. PANCREAS:  Homogeneous enhancement. SPLEEN:  Normal caliber. KIDNEYS:  No hydronephrosis or focal renal mass. ADRENALS:  Normal. AORTA/VASCULAR:  No aneurysm. RETROPERITONEUM:  No enlarged adenopathy. BOWEL/MESENTERY:  Normal caliber appendix.  Large amount of stool in the ascending transverse and proximal descending colon.  Large amount of stool in the rectosigmoid colon.  ABDOMINAL WALL:  No ventral wall hernia. PELVIC ORGANS:  Wall thickening of the urinary bladder with adjacent soft tissue stranding highly suspicious for cystitis.  Prostatic calcification.  BONES:  Mild degenerative changes in the lower lumbar facets.             CONCLUSION:  Wall thickening with adjacent inflammatory stranding surrounding the urinary bladder highly suspicious for cystitis.  Large amount of stool  in the colon and rectum.  Normal caliber appendix.  LOCATION:  [id number]   Dictated by (CST): Deepti Ornelas MD on 11/01/2024 at 6:35 PM     Finalized by (CST): Deepti Ornelas MD on 11/01/2024 at 6:38 PM        Medications   sodium chloride 0.9 % IV bolus 1,000 mL (1,000 mL Intravenous New Bag 11/1/24 1621)   iopamidol 76% (ISOVUE-370) injection for power injector (100 mL Intravenous Given 11/1/24 4764)     UA negative    Patient used fleets enema with moderate success.  Plan for MiraLAX and Colace at home.  Follow-up with GI if symptoms persist.  Return here if they are worsening    Medical Decision Making  Amount and/or Complexity of Data Reviewed  External Data Reviewed: notes.     Details: ER note 10/29        Disposition and Plan     Clinical Impression:  1. Constipation, unspecified constipation type         Disposition:  Discharge  11/1/2024  8:06 pm    Follow-up:  Jes Floyd MD  [address]  [facility] [address]  [phone]    Call  As needed          Medications Prescribed:  Current Discharge Medication List        START taking these medications    Details   polyethylene glycol, PEG 3350, 17 g Oral Powd Pack Take 17 g by mouth daily as needed.  Qty: 12 each, Refills: 0      docusate sodium 100 MG Oral Cap Take 1 capsule (100 mg total) by mouth 2 (two) times daily for 7 days.  Qty: 14 capsule, Refills: 0      bisacodyl 10 MG Rectal Suppos Place 1 suppository (10 mg total) rectally daily for 5 doses.  Qty: 5 suppository, Refills: 0                 Supplementary Documentation:

## 2024-11-02 NOTE — ED QUICK NOTES
Patient educated on use of fleet enema. All questions answered. Patient verbalized understanding. Patient administers fleet enema per order from Dr Mack

## 2024-12-05 ENCOUNTER — OFFICE VISIT (OUTPATIENT)
Dept: FAMILY MEDICINE CLINIC | Facility: CLINIC | Age: 64
End: 2024-12-05
Payer: COMMERCIAL

## 2024-12-05 ENCOUNTER — TELEPHONE (OUTPATIENT)
Dept: FAMILY MEDICINE CLINIC | Facility: CLINIC | Age: 64
End: 2024-12-05

## 2024-12-05 VITALS
RESPIRATION RATE: 21 BRPM | DIASTOLIC BLOOD PRESSURE: 72 MMHG | BODY MASS INDEX: 29.4 KG/M2 | SYSTOLIC BLOOD PRESSURE: 112 MMHG | WEIGHT: 210 LBS | HEIGHT: 71 IN | HEART RATE: 87 BPM | OXYGEN SATURATION: 98 %

## 2024-12-05 DIAGNOSIS — Z01.818 PRE-OP EXAM: Primary | ICD-10-CM

## 2024-12-05 LAB
ATRIAL RATE: 70 BPM
P AXIS: 48 DEGREES
P-R INTERVAL: 198 MS
Q-T INTERVAL: 376 MS
QRS DURATION: 82 MS
QTC CALCULATION (BEZET): 406 MS
R AXIS: -26 DEGREES
T AXIS: 43 DEGREES
VENTRICULAR RATE: 70 BPM

## 2024-12-05 PROCEDURE — 3074F SYST BP LT 130 MM HG: CPT

## 2024-12-05 PROCEDURE — 93000 ELECTROCARDIOGRAM COMPLETE: CPT

## 2024-12-05 PROCEDURE — 99213 OFFICE O/P EST LOW 20 MIN: CPT

## 2024-12-05 PROCEDURE — 3008F BODY MASS INDEX DOCD: CPT

## 2024-12-05 PROCEDURE — 3078F DIAST BP <80 MM HG: CPT

## 2024-12-05 NOTE — TELEPHONE ENCOUNTER
Transurethral resection of bladder tumor on Mon 12/09 at [facility] with Fly Becerra D.O. Preop sheet routed and orders received. Pre op apt on 12/05

## 2024-12-05 NOTE — PROGRESS NOTES
Chief Complaint   Patient presents with    Pre-Op Exam     Transurethral resection of bladder tumor on  at [facility] with Fly Becerra D.O      HPI related to surgery:   Raffaele Patton is a 64 year old male who presents for a pre-operative physical exam.   Raffaele Patton is scheduled for a transurethreal resection of the bladder tumor procedure at [facility] on 24 to be performed by Dr Fly Becerra DO.    Indication: cystocopy performed on 24 showed papillary erythema on dome of the bladder. Current bladder tumor    Patient reports previous anesthesia:  Yes.    Previous complications: No  Family hx of sudden cardiac death or cardiac issues: No  Is patient willing to accept any blood products during surgery if needed? Yes    Social History     Socioeconomic History    Marital status:    Tobacco Use    Smoking status: Former     Current packs/day: 0.00     Average packs/day: 2.0 packs/day for 35.0 years (70.0 ttl pk-yrs)     Types: Cigarettes     Start date: 1978     Quit date: 1986     Years since quittin.9     Passive exposure: Past    Smokeless tobacco: Never   Vaping Use    Vaping status: Never Used   Substance and Sexual Activity    Alcohol use: Yes     Alcohol/week: 10.0 standard drinks of alcohol     Types: 10 Cans of beer per week     Comment: twice a week    Drug use: No   Other Topics Concern    Caffeine Concern No    Exercise No    Seat Belt Yes    Special Diet No    Stress Concern No    Weight Concern Yes     Social Drivers of Health     Food Insecurity: No Food Insecurity (10/20/2024)    Food Insecurity     Food Insecurity: Never true   Transportation Needs: No Transportation Needs (10/20/2024)    Transportation Needs     Lack of Transportation: No   Housing Stability: Low Risk  (10/20/2024)    Housing Stability     Housing Instability: No      Past Medical History:    Abdominal hernia    Abdominal pain    Anxiety    Black stools    Blood in the stool     Constipation    Dizziness    Easy bruising    Essential hypertension    Fatigue    Frequent UTI    H. pylori infection    Hemorrhoids    High blood pressure    Irregular bowel habits    Loss of appetite    Nausea    Uncomfortable fullness after meals     Past Surgical History:   Procedure Laterality Date    Colonoscopy      Hernia surgery  08/09/2021    Repair ing hernia,5+y/o,reducibl       Allergies[1]  Current Outpatient Medications   Medication Sig Dispense Refill    lisinopril 20 MG Oral Tab Take 1 tablet (20 mg total) by mouth daily. 90 tablet 1    pantoprazole 40 MG Oral Tab EC Take 1 tablet (40 mg total) by mouth 2 (two) times daily before meals. 60 tablet 3    ALPRAZolam 0.5 MG Oral Tab TAKE 1 TABLET BY MOUTH TWICE DAILY AS NEEDED 60 tablet 5        REVIEW OF SYSTEMS:   GENERAL HEALTH:  Good energy level, good appetite.  SKIN: no unusual skin lesions or rashes  EYES: no visual  deficits  HEENT: no nasal congestion, sinus pain or sore throat  RESPIRATORY:no shortness of breath, wheezing or cough   CARDIOVASCULAR: denies chest pain, abnormal movement of the chest.  GI: no nausea, vomiting, constipation, diarrhea;   GENITAL/: Frequent urination and burning   MUSCULOSKELETAL: no joint problems upper or lower extremities  NEURO: no convulsions, abnormal sensation, no issues with sleeping.  PSYCHE: no hyper- or hypoactivity, good energy level.  HEMATOLOGY: no bruising or bleeding  ENDOCRINE: no excessive thirst or urination;   ALLERGY/IMM.: no food or seasonal allergies       PHYSICAL EXAM:   /72   Pulse 87   Resp 21   Ht 5' 11\" (1.803 m)   Wt 210 lb (95.3 kg)   SpO2 98%   BMI 29.29 kg/m²    Vital signs: Blood pressure 112/72, pulse 87, resp. rate 21, height 5' 11\" (1.803 m), weight 210 lb (95.3 kg), SpO2 98%.  General: No acute distress. Alert and oriented x 3.  HEENT: Moist mucous membranes. EOM-I. PERRL  Neck: No lymphadenopathy.  No JVD. No carotid bruits.  Respiratory: Clear to auscultation  bilaterally.  No wheezes. No rhonchi.  Cardiovascular: S1, S2.  Regular rate and rhythm.  No murmurs. Equal pulses   Abdomen: Soft, nontender, nondistended.  Positive bowel sounds. No rebound tenderness  Neurologic: No focal neurological deficits.  Musculoskeletal: Full range of motion of all extremities.  No swelling noted.  Integument: No lesions. No erythema.  Psychiatric: Appropriate mood and affect.    LABORATORY DATA:   CBC with differential, CMP, UA with culture, and PT/INR labs ordered by  already.  Patient informed me that he will complete those labs tomorrow 12/6/2024 when the lab opens.    ASSESSMENT AND PLAN:   Assessment:  1. Pre-op exam  Required preop labs requested by Dr. Fly Becerra have been already ordered by Dr. Floyd (Raffaele's PCP).  Raffaele informed me that he will complete these labs tomorrow 12/6/2024.  - ELECTROCARDIOGRAM, COMPLETE  EKG: no acute process, NSR, normal rate and rhythm.  No axis deviation normal intervals no acute ST depression or ST elevation. Minimal voltage criteria for LVH.  EKG is consistent with last previous EKG done on 10/29/2024  Raffaele does see [facility] cardiology Mcdaniel routinely with his last visit being 11/21/2024.  [facility] cardiology Mcdaniel recommended a future carotid artery ultrasound screening as a prevention screening due to his past medical history of hypertension and family hypercholesteremia and to follow-up in 6 months.  Since Raffaele had a normal-appearing EKG Today, I believe he is stable from a cardiac standpoint for his upcoming surgery on 12/9/24.    Raffaele Patton has no significant history of pulmonary conditions.   He is a good surgical candidate. This consult was sent back the referring physician, Dr. Hernan BAXTER..  Raffaele Patton is reminded to stop any NSAIDS medication and all vitamins 7 days prior to the surgery  Raffaele Patton reminded to be P/O prior to surgery and to consult with surgeon for how long prior to surgery.  Salomon  Abdi reminded to visit primary care provider again if falls ill prior to surgery occurring.    Meds, Orders, & Refills for this Visit:  Requested Prescriptions      No prescriptions requested or ordered in this encounter       Health Maintenance:  Health Maintenance Due   Topic Date Due    Pneumococcal Vaccine: Birth to 64yrs (1 of 2 - PCV) Never done    DTaP,Tdap,and Td Vaccines (1 - Tdap) Never done    Zoster Vaccines (1 of 2) Never done    Annual Physical  12/12/2024       Patient/Caregiver Education: Patient/Caregiver Education: There are no barriers to learning. Medical education done.   Outcome: Raffaele Patton verbalizes understanding, agrees with the plan, and had no other questions at the end of today's visit. Raffaele Patton is informed to call with any questions, complications, allergies, or worsening or changing symptoms.  Raffaele Patton is to call with any side effects or complications from the treatments as a result of today.     Problem List:  Patient Active Problem List   Diagnosis    Routine general medical examination at a health care facility    Benign essential HTN    Gastrointestinal hemorrhage, unspecified gastrointestinal hemorrhage type    Orthostatic hypotension    Dehydration    Metabolic acidosis    Lightheadedness             [1] No Known Allergies

## 2024-12-06 ENCOUNTER — TELEPHONE (OUTPATIENT)
Dept: FAMILY MEDICINE CLINIC | Facility: CLINIC | Age: 64
End: 2024-12-06

## 2024-12-06 ENCOUNTER — LAB ENCOUNTER (OUTPATIENT)
Dept: LAB | Age: 64
End: 2024-12-06
Attending: FAMILY MEDICINE
Payer: COMMERCIAL

## 2024-12-06 DIAGNOSIS — Z01.818 PREOP EXAMINATION: Primary | ICD-10-CM

## 2024-12-06 DIAGNOSIS — D49.4 BLADDER TUMOR: ICD-10-CM

## 2024-12-06 DIAGNOSIS — D49.4 BLADDER NEOPLASM: ICD-10-CM

## 2024-12-06 LAB
ALBUMIN SERPL-MCNC: 4.1 G/DL (ref 3.2–4.8)
ALBUMIN/GLOB SERPL: 1.3 {RATIO} (ref 1–2)
ALP LIVER SERPL-CCNC: 67 U/L
ALT SERPL-CCNC: 16 U/L
ANION GAP SERPL CALC-SCNC: 6 MMOL/L (ref 0–18)
AST SERPL-CCNC: 21 U/L (ref ?–34)
BASOPHILS # BLD AUTO: 0.02 X10(3) UL (ref 0–0.2)
BASOPHILS NFR BLD AUTO: 0.4 %
BILIRUB SERPL-MCNC: 0.5 MG/DL (ref 0.2–1.1)
BILIRUB UR QL STRIP.AUTO: NEGATIVE
BUN BLD-MCNC: 14 MG/DL (ref 9–23)
CALCIUM BLD-MCNC: 9.5 MG/DL (ref 8.7–10.4)
CHLORIDE SERPL-SCNC: 106 MMOL/L (ref 98–112)
CLARITY UR REFRACT.AUTO: CLEAR
CO2 SERPL-SCNC: 26 MMOL/L (ref 21–32)
COLOR UR AUTO: YELLOW
CREAT BLD-MCNC: 0.93 MG/DL
EGFRCR SERPLBLD CKD-EPI 2021: 92 ML/MIN/1.73M2 (ref 60–?)
EOSINOPHIL # BLD AUTO: 0.14 X10(3) UL (ref 0–0.7)
EOSINOPHIL NFR BLD AUTO: 2.7 %
ERYTHROCYTE [DISTWIDTH] IN BLOOD BY AUTOMATED COUNT: 12.4 %
FASTING STATUS PATIENT QL REPORTED: YES
GLOBULIN PLAS-MCNC: 3.1 G/DL (ref 2–3.5)
GLUCOSE BLD-MCNC: 100 MG/DL (ref 70–99)
GLUCOSE UR STRIP.AUTO-MCNC: NORMAL MG/DL
HCT VFR BLD AUTO: 45.7 %
HGB BLD-MCNC: 15.4 G/DL
IMM GRANULOCYTES # BLD AUTO: 0.01 X10(3) UL (ref 0–1)
IMM GRANULOCYTES NFR BLD: 0.2 %
INR BLD: 1.05 (ref 0.8–1.2)
KETONES UR STRIP.AUTO-MCNC: NEGATIVE MG/DL
LEUKOCYTE ESTERASE UR QL STRIP.AUTO: NEGATIVE
LYMPHOCYTES # BLD AUTO: 1.18 X10(3) UL (ref 1–4)
LYMPHOCYTES NFR BLD AUTO: 22.6 %
MCH RBC QN AUTO: 33 PG (ref 26–34)
MCHC RBC AUTO-ENTMCNC: 33.7 G/DL (ref 31–37)
MCV RBC AUTO: 97.9 FL
MONOCYTES # BLD AUTO: 0.51 X10(3) UL (ref 0.1–1)
MONOCYTES NFR BLD AUTO: 9.8 %
NEUTROPHILS # BLD AUTO: 3.37 X10 (3) UL (ref 1.5–7.7)
NEUTROPHILS # BLD AUTO: 3.37 X10(3) UL (ref 1.5–7.7)
NEUTROPHILS NFR BLD AUTO: 64.3 %
NITRITE UR QL STRIP.AUTO: NEGATIVE
OSMOLALITY SERPL CALC.SUM OF ELEC: 287 MOSM/KG (ref 275–295)
PH UR STRIP.AUTO: 7 [PH] (ref 5–8)
PLATELET # BLD AUTO: 252 10(3)UL (ref 150–450)
POTASSIUM SERPL-SCNC: 4.4 MMOL/L (ref 3.5–5.1)
PROT SERPL-MCNC: 7.2 G/DL (ref 5.7–8.2)
PROT UR STRIP.AUTO-MCNC: 100 MG/DL
PROTHROMBIN TIME: 13.8 SECONDS (ref 11.6–14.8)
RBC # BLD AUTO: 4.67 X10(6)UL
RBC #/AREA URNS AUTO: >10 /HPF
SODIUM SERPL-SCNC: 138 MMOL/L (ref 136–145)
SP GR UR STRIP.AUTO: 1.02 (ref 1–1.03)
UROBILINOGEN UR STRIP.AUTO-MCNC: NORMAL MG/DL
WBC # BLD AUTO: 5.2 X10(3) UL (ref 4–11)
WBC CLUMPS UR QL AUTO: PRESENT /HPF

## 2024-12-06 PROCEDURE — 80053 COMPREHEN METABOLIC PANEL: CPT | Performed by: FAMILY MEDICINE

## 2024-12-06 PROCEDURE — 85610 PROTHROMBIN TIME: CPT | Performed by: FAMILY MEDICINE

## 2024-12-06 PROCEDURE — 81001 URINALYSIS AUTO W/SCOPE: CPT | Performed by: FAMILY MEDICINE

## 2024-12-06 PROCEDURE — 85025 COMPLETE CBC W/AUTO DIFF WBC: CPT | Performed by: FAMILY MEDICINE

## 2024-12-06 NOTE — TELEPHONE ENCOUNTER
Clean wounds on sacrum, left buttock, and right heel with normal saline.     LEFT ISCHIUM and SACRUM - Apply Desitin (Zinc Oxide) to the connie-wound. Apply Dakin's 0.125% (1/4 strength) or Vashe moistened gauze to the wound bed.  Cover with dry gauze and an ABD pad. Secure with fabric or paper tape only. Change dressings daily.       Right Heel and Left Buttock- Apply Betadine to the wound bed. Cover Right Heel with dry gauze and Donta gauze roll. Cover Left Buttock with guaze and ABD .Change dressing daily.     Follow a nutritious diet (increase protein, Vitamin A & C, & Zinc) for optimal wound healing.     Monitor wound for symptoms of infection including redness or swelling around the wound, worsening pain, increased drainage or drainage that is yellow, yellow-green, or pus/foul-smelling, fever, chills, or loss of appetite. Don't put anything in the wound that is not prescribed.   Received call from [facility] asking for pre-op, EKG and labs to be faxed for pt's procedure Monday.     Pt's note and EKG has been faxed - Contacted pt to remind him to get labs done - latest today to try to have lab results before procedure date.     Spoke to wife and wife stated he will be here for labs before 4pm.     Fax: [phone]    Forwarding to Dr. ELIZABETH Brandt'bruna FOWLER, to keep an eye out for lab results Monday. (Please fax to [facility] if available)

## 2024-12-09 NOTE — TELEPHONE ENCOUNTER
Received call from [facility] nurse at [facility], asking for labs that were done on Friday. Can fax to [phone]. Per Dr. Floyd result note:    Jes Floyd MD  12/9/2024  6:55 AM CST Back to Top      Stable for surgery, is urine culture going to be done?       Labs faxed. Called lab to see if culture is going to be done, UA did not reflex to culture and it is too late to add culture. Patient would need to return to the lab to get culture if needed.     Routing to Dr. Floyd to update- culture not done.

## 2024-12-19 ENCOUNTER — MED REC SCAN ONLY (OUTPATIENT)
Dept: FAMILY MEDICINE CLINIC | Facility: CLINIC | Age: 64
End: 2024-12-19

## 2025-01-02 ENCOUNTER — APPOINTMENT (OUTPATIENT)
Age: 65
End: 2025-01-02
Attending: INTERNAL MEDICINE
Payer: COMMERCIAL

## 2025-01-02 ENCOUNTER — TELEPHONE (OUTPATIENT)
Age: 65
End: 2025-01-02

## 2025-01-02 DIAGNOSIS — H81.13 BENIGN PAROXYSMAL POSITIONAL VERTIGO DUE TO BILATERAL VESTIBULAR DISORDER: Primary | ICD-10-CM

## 2025-01-02 NOTE — TELEPHONE ENCOUNTER
Patient's wife want to discuss her 's appointment today 1/2/25 at 300 with Dr. Wise. she stated that he is suppose to see Dr. Wise and she would like a call back ASAP

## 2025-01-06 ENCOUNTER — OFFICE VISIT (OUTPATIENT)
Age: 65
End: 2025-01-06
Attending: INTERNAL MEDICINE
Payer: COMMERCIAL

## 2025-01-06 VITALS
OXYGEN SATURATION: 98 % | HEIGHT: 70.98 IN | HEART RATE: 83 BPM | WEIGHT: 209.19 LBS | TEMPERATURE: 97 F | BODY MASS INDEX: 29.29 KG/M2 | SYSTOLIC BLOOD PRESSURE: 147 MMHG | RESPIRATION RATE: 16 BRPM | DIASTOLIC BLOOD PRESSURE: 99 MMHG

## 2025-01-06 DIAGNOSIS — C67.9 MALIGNANT NEOPLASM OF URINARY BLADDER, UNSPECIFIED SITE (HCC): Primary | ICD-10-CM

## 2025-01-06 NOTE — PROGRESS NOTES
New consult for bladder cancer.   He was diagnosed at [facility].       Outpatient Oncology Care Plan  Problem list:  knowledge deficit    Problems related to:    disease/disease progression    Interventions:  provided general teaching    Expected outcomes:  understands plan of care    Progress towards outcome:  making progress    Education Record    Learner:  Patient  Barriers / Limitations:  None  Method:  Brief focused  Outcome:  Shows understanding  Comments:

## 2025-01-06 NOTE — CONSULTS
Cancer Center Report of Consultation    Patient Name: Raffaele Patton   YOB: 1960   Medical Record Number: [id number]   CSN: [id number]   Consulting Physician: Lonnie Wise M.D.   Referring Physician: No ref. provider found    Date of Consultation: 1/6/2025     Reason for Consultation (Chief Complaint):  Chief Complaint   Patient presents with    Consult        History of Present Illness:  Raffaele Patton is a 64 year old male referred for evaluation of bladder cancer. HE was in the er for abdominal pain, where a CT revealed thickening of the bladder well. He was referred to urology, where he underwent cystoscopy and TURBT. Pathology revealed high grade papillary urothelial cancer, but no muscle invasive disease was detected. Repeat Cystoscopy with TURBT and intravesicular bHCG is planned.    HE feels well. No Hematuria.      Past Medical History:  Past Medical History:    Abdominal hernia    Abdominal pain    Anxiety    Black stools    Blood in the stool    Constipation    Dizziness    Easy bruising    Essential hypertension    Fatigue    Frequent UTI    H. pylori infection    Hemorrhoids    High blood pressure    Irregular bowel habits    Loss of appetite    Nausea    Uncomfortable fullness after meals       Past Surgical History:  Past Surgical History:   Procedure Laterality Date    Colonoscopy      Hernia surgery  08/09/2021    Repair ing hernia,5+y/o,reducibl         Gynecologic History:      Family History:  Family History   Problem Relation Age of Onset    Other (Other) Father     Other (lung cancer) Father     Heart Disorder Mother     Other (Other) Mother     Other (osteoporosis) Mother        Social History:  Social History     Socioeconomic History    Marital status:      Spouse name: Not on file    Number of children: Not on file    Years of education: Not on file    Highest education level: Not on file   Occupational History    Not on file   Tobacco Use    Smoking status:  Former     Current packs/day: 0.00     Average packs/day: 2.0 packs/day for 35.0 years (70.0 ttl pk-yrs)     Types: Cigarettes     Start date: 1978     Quit date: 1986     Years since quittin.0     Passive exposure: Past    Smokeless tobacco: Never   Vaping Use    Vaping status: Never Used   Substance and Sexual Activity    Alcohol use: Yes     Alcohol/week: 10.0 standard drinks of alcohol     Types: 10 Cans of beer per week     Comment: twice a week    Drug use: No    Sexual activity: Not on file   Other Topics Concern    Caffeine Concern No    Exercise No    Seat Belt Yes    Special Diet No    Stress Concern No    Weight Concern Yes   Social History Narrative    Not on file     Social Drivers of Health     Financial Resource Strain: Not on file   Food Insecurity: No Food Insecurity (10/20/2024)    Food Insecurity     Food Insecurity: Never true   Transportation Needs: No Transportation Needs (10/20/2024)    Transportation Needs     Lack of Transportation: No     Car Seat: Not on file   Physical Activity: Not on file   Stress: Not on file   Social Connections: Not on file   Housing Stability: Low Risk  (10/20/2024)    Housing Stability     Housing Instability: No     Housing Instability Emergency: Not on file     Crib or Bassinette: Not on file       Current Medications:    Current Outpatient Medications:     lisinopril 20 MG Oral Tab, Take 1 tablet (20 mg total) by mouth daily., Disp: 90 tablet, Rfl: 1    pantoprazole 40 MG Oral Tab EC, Take 1 tablet (40 mg total) by mouth 2 (two) times daily before meals., Disp: 60 tablet, Rfl: 3    ALPRAZolam 0.5 MG Oral Tab, TAKE 1 TABLET BY MOUTH TWICE DAILY AS NEEDED, Disp: 60 tablet, Rfl: 5    Allergies:  Allergies[1]     Review of Systems:    Constitutional No fevers, chills, night sweats, excessive fatigue or weight loss.   Eyes No significant visual difficulties. No diplopia. No yellowing of the eyes.   Hematologic/Lymphatic No easy bruising or bleeding.  No  any tender or palpable lymph nodes.   Respiratory No dyspnea, Pleuritic chest pain, cough or hemoptysis.   Cardiovascular No anginal chest pain, palpitations or orthopnea.   Gastrointestinal No nausea, vomiting, diarrhea, GI bleeding, or constipation.   Genitorurinary (M) No hematuria, dysuria, or incontinence. No abnormal bleeding.   Integumentary No rashes or yellowing of the skin   Neurologic No headache, blurred vision, and no areas of focal weakness. Normal gait.   Psychiatric No insomnia, depression, kenyon or mood swings.         Vital Signs:  BP (!) 147/99 (BP Location: Left arm, Patient Position: Sitting, Cuff Size: adult)   Pulse 83   Temp 97.3 °F (36.3 °C) (Temporal)   Resp 16   Ht 1.803 m (5' 10.98\")   Wt 94.9 kg (209 lb 3.2 oz)   SpO2 98%   BMI 29.19 kg/m²     Performance Status:  ECOG 0    Physical Examination:    Constitutional Normal - Alert, cooperative, oriented. Mood and affect appropriate. Appears close to chronological age. Well nourished. Well developed.   Eyes Normal - Conjunctivae and sclerae are clear and without icterus. Pupils are reactive and equal.   ENMT Normal - Sinuses are nontender.  No oral exudates, ulcers, masses, thrush or mucositis. Oropharynx clear.  Tongue normal.   Neck Normal - Supple without masses or thyromegaly.   Hematologic/Lymphatic Normal - No petechiae or purpura.  No tender or palpable lymph nodes in the cervical, supraclavicular, axillary or inguinal area.   Respiratory Normal - Lungs are clear to auscultation without rhonchi or wheezing.   Cardiovascular Normal - Regular rate and rhythm of heart without murmurs, gallops or rubs.   Abdomen Normal - Non-tender, non-distended, no masses, ascites or hepatosplenomegaly. Good bowel sounds. No guarding or rebound tenderness. No pulsatile masses.   Extremities Normal - No visible deformities, no cyanosis, clubbing or edema. Pulses 4+ and equal bilaterally.   Integumentary Normal - No rashes, scars, or lesions  suggestive of malignancy.   Neurologic Normal - No sensory or motor deficits, normal cerebellar function, normal gait, cranial nerves intact.   Psychiatric Normal - Alert and oriented times three. Coherent speech. Verbalizes understanding of our discussions today.       Laboratory:      Latest Reference Range & Units 12/06/24 14:05   Sodium 136 - 145 mmol/L 138   Potassium 3.5 - 5.1 mmol/L 4.4   Chloride 98 - 112 mmol/L 106   Carbon Dioxide, Total 21.0 - 32.0 mmol/L 26.0   BUN 9 - 23 mg/dL 14   CREATININE 0.70 - 1.30 mg/dL 0.93   CALCIUM 8.7 - 10.4 mg/dL 9.5   EGFR >=60 mL/min/1.73m2 92   ANION GAP 0 - 18 mmol/L 6   CALCULATED OSMOLALITY 275 - 295 mOsm/kg 287   ALKALINE PHOSPHATASE 45 - 117 U/L 67   AST (SGOT) <34 U/L 21   ALT (SGPT) 10 - 49 U/L 16   Total Bilirubin 0.2 - 1.1 mg/dL 0.5   Globulin 2.0 - 3.5 g/dL 3.1         Latest Reference Range & Units 12/06/24 14:05   A/G Ratio 1.0 - 2.0  1.3   PROTEIN, TOTAL 5.7 - 8.2 g/dL 7.2   Albumin 3.2 - 4.8 g/dL 4.1      Latest Reference Range & Units 12/06/24 14:05   PT 11.6 - 14.8 seconds 13.8   INR 0.80 - 1.20  1.05      Latest Reference Range & Units 12/06/24 14:05   WBC 4.0 - 11.0 x10(3) uL 5.2   Hemoglobin 13.0 - 17.5 g/dL 15.4   Hematocrit 39.0 - 53.0 % 45.7   Platelet Count 150.0 - 450.0 10(3)uL 252.0   RBC 4.30 - 5.70 x10(6)uL 4.67   MCH 26.0 - 34.0 pg 33.0   MCHC 31.0 - 37.0 g/dL 33.7   MCV 80.0 - 100.0 fL 97.9   RDW % 12.4   Prelim Neutrophil Abs 1.50 - 7.70 x10 (3) uL 3.37   Neutrophils Absolute 1.50 - 7.70 x10(3) uL 3.37   Lymphocytes Absolute 1.00 - 4.00 x10(3) uL 1.18   Monocytes Absolute 0.10 - 1.00 x10(3) uL 0.51   Eosinophils Absolute 0.00 - 0.70 x10(3) uL 0.14   Basophils Absolute 0.00 - 0.20 x10(3) uL 0.02   Immature Granulocyte Absolute 0.00 - 1.00 x10(3) uL 0.01   Neutrophils % % 64.3   Lymphocytes % % 22.6   Monocytes % % 9.8   Eosinophils % % 2.7   Basophils % % 0.4   Immature Granulocyte % % 0.2         Radiology:    Pathology:  Outside pathology  reviewed:  High grade papillary urothelial cacner without muscle invasive disease    Impression and Plan:  Non-Muscle invasive urothelial cancer: We reviewed the pathophysiology of bladder cancer and the differentiation between muscle invasive disease and non-muscle invasive disease. I agree with the repeat TURBT and the plan for intravesicular therapy. We discussed that if muscle invasive disease is found on the repeat TURBT, he should return for a discussion of the treatment of muscle invasive disease. If no muscle invasive disease is present, he can continue to follow with urology for treatment.     Planned Follow Up:  As needed    I spent 60 minutes face to face with the patient.  More than 50% of that time was spent counseling the patient and/or on coordination of care.  The diagnosis, prognosis, and general treatment was explained to the patient and the family.    Risk Level: High    Electronically Signed by:    Lonnie Wise M.D.  [facility]          [1] No Known Allergies

## 2025-01-16 ENCOUNTER — APPOINTMENT (OUTPATIENT)
Age: 65
End: 2025-01-16
Attending: INTERNAL MEDICINE
Payer: COMMERCIAL

## 2025-01-20 ENCOUNTER — MED REC SCAN ONLY (OUTPATIENT)
Dept: FAMILY MEDICINE CLINIC | Facility: CLINIC | Age: 65
End: 2025-01-20

## 2025-02-28 ENCOUNTER — APPOINTMENT (OUTPATIENT)
Dept: MRI IMAGING | Age: 65
End: 2025-02-28
Attending: EMERGENCY MEDICINE
Payer: COMMERCIAL

## 2025-02-28 ENCOUNTER — APPOINTMENT (OUTPATIENT)
Dept: GENERAL RADIOLOGY | Age: 65
End: 2025-02-28
Payer: COMMERCIAL

## 2025-02-28 ENCOUNTER — HOSPITAL ENCOUNTER (EMERGENCY)
Age: 65
Discharge: HOME | End: 2025-02-28
Attending: EMERGENCY MEDICINE
Payer: COMMERCIAL

## 2025-02-28 VITALS
TEMPERATURE: 99 F | HEIGHT: 71 IN | HEART RATE: 70 BPM | DIASTOLIC BLOOD PRESSURE: 90 MMHG | SYSTOLIC BLOOD PRESSURE: 121 MMHG | WEIGHT: 205 LBS | OXYGEN SATURATION: 97 % | RESPIRATION RATE: 16 BRPM | BODY MASS INDEX: 28.7 KG/M2

## 2025-02-28 DIAGNOSIS — H81.10 BENIGN PAROXYSMAL POSITIONAL VERTIGO, UNSPECIFIED LATERALITY: Primary | ICD-10-CM

## 2025-02-28 LAB
ALBUMIN SERPL-MCNC: 4.4 G/DL (ref 3.2–4.8)
ALBUMIN/GLOB SERPL: 1.3 {RATIO} (ref 1–2)
ALP LIVER SERPL-CCNC: 75 U/L
ALT SERPL-CCNC: 17 U/L
ANION GAP SERPL CALC-SCNC: 8 MMOL/L (ref 0–18)
AST SERPL-CCNC: 25 U/L (ref ?–34)
BASOPHILS # BLD AUTO: 0.02 X10(3) UL (ref 0–0.2)
BASOPHILS NFR BLD AUTO: 0.3 %
BILIRUB SERPL-MCNC: 1 MG/DL (ref 0.2–1.1)
BUN BLD-MCNC: 12 MG/DL (ref 9–23)
CALCIUM BLD-MCNC: 9.5 MG/DL (ref 8.7–10.6)
CHLORIDE SERPL-SCNC: 104 MMOL/L (ref 98–112)
CO2 SERPL-SCNC: 24 MMOL/L (ref 21–32)
CREAT BLD-MCNC: 1.03 MG/DL
EGFRCR SERPLBLD CKD-EPI 2021: 81 ML/MIN/1.73M2 (ref 60–?)
EOSINOPHIL # BLD AUTO: 0.1 X10(3) UL (ref 0–0.7)
EOSINOPHIL NFR BLD AUTO: 1.3 %
ERYTHROCYTE [DISTWIDTH] IN BLOOD BY AUTOMATED COUNT: 13.9 %
GLOBULIN PLAS-MCNC: 3.3 G/DL (ref 2–3.5)
GLUCOSE BLD-MCNC: 103 MG/DL (ref 70–99)
HCT VFR BLD AUTO: 48.3 %
HGB BLD-MCNC: 17.2 G/DL
IMM GRANULOCYTES # BLD AUTO: 0.02 X10(3) UL (ref 0–1)
IMM GRANULOCYTES NFR BLD: 0.3 %
LYMPHOCYTES # BLD AUTO: 1.3 X10(3) UL (ref 1–4)
LYMPHOCYTES NFR BLD AUTO: 17.5 %
MCH RBC QN AUTO: 32.3 PG (ref 26–34)
MCHC RBC AUTO-ENTMCNC: 35.6 G/DL (ref 31–37)
MCV RBC AUTO: 90.8 FL
MONOCYTES # BLD AUTO: 0.59 X10(3) UL (ref 0.1–1)
MONOCYTES NFR BLD AUTO: 7.9 %
NEUTROPHILS # BLD AUTO: 5.4 X10 (3) UL (ref 1.5–7.7)
NEUTROPHILS # BLD AUTO: 5.4 X10(3) UL (ref 1.5–7.7)
NEUTROPHILS NFR BLD AUTO: 72.7 %
OSMOLALITY SERPL CALC.SUM OF ELEC: 282 MOSM/KG (ref 275–295)
PLATELET # BLD AUTO: 264 10(3)UL (ref 150–450)
POTASSIUM SERPL-SCNC: 3.7 MMOL/L (ref 3.5–5.1)
PROT SERPL-MCNC: 7.7 G/DL (ref 5.7–8.2)
RBC # BLD AUTO: 5.32 X10(6)UL
SODIUM SERPL-SCNC: 136 MMOL/L (ref 136–145)
TROPONIN I SERPL HS-MCNC: 12 NG/L
WBC # BLD AUTO: 7.4 X10(3) UL (ref 4–11)

## 2025-02-28 PROCEDURE — 71045 X-RAY EXAM CHEST 1 VIEW: CPT | Performed by: EMERGENCY MEDICINE

## 2025-02-28 PROCEDURE — 93005 ELECTROCARDIOGRAM TRACING: CPT

## 2025-02-28 PROCEDURE — 85025 COMPLETE CBC W/AUTO DIFF WBC: CPT | Performed by: EMERGENCY MEDICINE

## 2025-02-28 PROCEDURE — 70553 MRI BRAIN STEM W/O & W/DYE: CPT | Performed by: EMERGENCY MEDICINE

## 2025-02-28 PROCEDURE — 84484 ASSAY OF TROPONIN QUANT: CPT | Performed by: EMERGENCY MEDICINE

## 2025-02-28 PROCEDURE — 99285 EMERGENCY DEPT VISIT HI MDM: CPT

## 2025-02-28 PROCEDURE — 96374 THER/PROPH/DIAG INJ IV PUSH: CPT

## 2025-02-28 PROCEDURE — A9575 INJ GADOTERATE MEGLUMI 0.1ML: HCPCS | Performed by: EMERGENCY MEDICINE

## 2025-02-28 PROCEDURE — 80053 COMPREHEN METABOLIC PANEL: CPT | Performed by: EMERGENCY MEDICINE

## 2025-02-28 PROCEDURE — 93010 ELECTROCARDIOGRAM REPORT: CPT

## 2025-02-28 RX ORDER — LORAZEPAM 2 MG/ML
0.5 INJECTION INTRAMUSCULAR ONCE
Status: COMPLETED | OUTPATIENT
Start: 2025-02-28 | End: 2025-02-28

## 2025-02-28 RX ORDER — MECLIZINE HYDROCHLORIDE 25 MG/1
25 TABLET ORAL 3 TIMES DAILY PRN
Qty: 20 TABLET | Refills: 0 | Status: SHIPPED | OUTPATIENT
Start: 2025-02-28

## 2025-02-28 RX ORDER — GADOTERATE MEGLUMINE 376.9 MG/ML
20 INJECTION INTRAVENOUS
Status: COMPLETED | OUTPATIENT
Start: 2025-02-28 | End: 2025-02-28

## 2025-03-01 LAB
ATRIAL RATE: 81 BPM
P AXIS: 50 DEGREES
P-R INTERVAL: 182 MS
Q-T INTERVAL: 370 MS
QRS DURATION: 92 MS
QTC CALCULATION (BEZET): 429 MS
R AXIS: -35 DEGREES
T AXIS: 64 DEGREES
VENTRICULAR RATE: 81 BPM

## 2025-03-01 NOTE — DISCHARGE INSTRUCTIONS
Epley Maneuver For Benign Paroxysmal Positional Vertigo (BPPV)   The Epley maneuver is a repositioning technique used to treat BPPV, a common cause of vertigo due to dislodged calcium crystals (otoconia) in the inner ear. It should be performed under medical supervision the first time to ensure safety.    Before You Start  Find a safe space (e.g., a bed or cushioned floor) to prevent injury in case of dizziness.  Have a support person nearby if needed.  Determine the affected ear (typically identified during a medical examination).    Epley Maneuver Steps  For BPPV in the Right Ear: (Reverse these steps for the left ear.)  Starting Position:  Sit on the edge of a bed or firm surface with your legs extended.  Turn your head 45 degrees to the right (toward the affected ear).  Lie Down Quickly:  Keeping your head turned, lie back so your shoulders rest on the bed and your head hangs slightly off the edge (about 30 degrees below horizontal).  Stay in this position for 30-60 seconds or until dizziness stops.  Turn Head to the Opposite Side:  Without lifting your head, turn it 90 degrees to the left (so it's now 45 degrees to the left side).  Hold this position for 30-60 seconds or until dizziness stops.  Roll Onto Your Left Side:  Keeping your head turned, roll onto your left side, turning your head slightly downward (nose toward the floor).  Hold for 30-60 seconds or until dizziness stops.  Return to Sitting:  Slowly sit up and remain seated for a few minutes.    Aftercare Instructions  Avoid sudden head movements for the next 24 hours.  Sleep with your head slightly elevated (e.g., using extra pillows).  Repeat the maneuver if symptoms persist (1-2 times per day for a few days).

## 2025-03-01 NOTE — ED PROVIDER NOTES
Patient Seen in: [facility] In [place]      History     Chief Complaint   Patient presents with    Chest Pain Angina     Stated Complaint: Chest Pain X 2 days    Subjective:     HPI    64-year-old male with hypertension (lisinopril) and peptic ulcer disease who reports experiencing vertigo that began yesterday morning, describing a sensation of dizziness and imbalance, akin to being on a carnival ride. The symptoms were severe enough to require holding onto walls for support. The vertigo persisted for about three hours, improving when lying down but recurring upon standing. Blood pressure was initially elevated at 160 but later dropped to 120/70. The patient said he is had intermittent vertigo over the past 3 months and never prior to that.  He did have vomiting without hematemesis. The patient also reports intermittent frontal headache and neck ache.    Objective:   Past Medical History:    Abdominal hernia    Abdominal pain    Anxiety    Black stools    Blood in the stool    Constipation    Dizziness    Easy bruising    Essential hypertension    Fatigue    Frequent UTI    H. pylori infection    Hemorrhoids    High blood pressure    Irregular bowel habits    Loss of appetite    Nausea    Uncomfortable fullness after meals              Past Surgical History:   Procedure Laterality Date    Colonoscopy      Hernia surgery  2021    Repair ing hernia,5+y/o,reducibl                  Social History     Socioeconomic History    Marital status:    Tobacco Use    Smoking status: Former     Current packs/day: 0.00     Average packs/day: 2.0 packs/day for 35.0 years (70.0 ttl pk-yrs)     Types: Cigarettes     Start date: 1978     Quit date: 1986     Years since quittin.1     Passive exposure: Past    Smokeless tobacco: Never   Vaping Use    Vaping status: Never Used   Substance and Sexual Activity    Alcohol use: Yes     Alcohol/week: 10.0 standard drinks of alcohol     Types: 10  Cans of beer per week     Comment: twice a week    Drug use: No   Other Topics Concern    Caffeine Concern No    Exercise No    Seat Belt Yes    Special Diet No    Stress Concern No    Weight Concern Yes     Social Drivers of Health     Food Insecurity: No Food Insecurity (10/20/2024)    Food Insecurity     Food Insecurity: Never true   Transportation Needs: No Transportation Needs (10/20/2024)    Transportation Needs     Lack of Transportation: No   Housing Stability: Low Risk  (10/20/2024)    Housing Stability     Housing Instability: No              Review of Systems    Positive for stated complaint: Chest Pain X 2 days  Other systems are as noted in HPI.  Constitutional and vital signs reviewed.      All other systems reviewed and negative except as noted above.    Physical Exam     ED Triage Vitals   BP 02/28/25 1939 (!) 177/106   Pulse 02/28/25 1939 111   Resp 02/28/25 1939 20   Temp 02/28/25 1939 98.5 °F (36.9 °C)   Temp src 02/28/25 1939 Oral   SpO2 02/28/25 1939 99 %   O2 Device 02/28/25 2044 None (Room air)       Current:/90   Pulse 70   Temp 98.5 °F (36.9 °C) (Oral)   Resp 16   Ht 180.3 cm (5' 11\")   Wt 93 kg   SpO2 97%   BMI 28.59 kg/m²       General:  Vitals as listed.  No acute distress   HEENT: Sclerae anicteric.  Conjunctivae show no pallor.  Oropharynx clear, mucous membranes moist   Lungs: good air exchange  Extremities: no edema, normal peripheral pulses   Neuro: Alert oriented and nonfocal.  No nystagmus      ED Course     Labs Reviewed   COMP METABOLIC PANEL (14) - Abnormal; Notable for the following components:       Result Value    Glucose 103 (*)     All other components within normal limits   TROPONIN I HIGH SENSITIVITY - Normal   CBC WITH DIFFERENTIAL WITH PLATELET   RAINBOW DRAW BLUE     MRI showed no acute infarct or hemorrhage.  I independently read the radiographs and no pulmonary edema on chest x-ray  EKG    Rate, intervals and axes as noted on EKG Report.  Rate:  81  Rhythm: Sinus Rhythm  Reading: No acute ST-T changes.  No significant change from EKG done 12/5/2024           ED COURSE and MDM     Sources of the medical history included the patient and his wife    Differential diagnosis before testing included peripheral vertigo versus cerebellar bleed, a medical condition that poses a threat to life.    I reviewed prior external notes including stress echocardiogram that was done 10/1/2021 and normal    Patient given information about BPPV and the Epley maneuver.  He will follow-up with ENT.  He can try meclizine for symptomatic relief.  He is not supposed to drive until symptoms gone at least for 2 days.     I have discussed with the patient the results of testing, differential diagnosis, and treatment plan. They expressed clear understanding of these instructions and agrees to the plan provided.    Disposition and Plan     Clinical Impression:  1. Benign paroxysmal positional vertigo, unspecified laterality         Disposition:  Discharge  2/28/2025  9:54 pm    Follow-up:  Korey Merlos MD  [address] [facility] [id number] [phone]    Schedule an appointment as soon as possible for a visit in 1 week(s)  As needed    Jes Floyd MD  [address]  [facility] [address]  [phone]    Schedule an appointment as soon as possible for a visit in 3 day(s)  As needed        Medications Prescribed:  Discharge Medication List as of 2/28/2025  9:57 PM        START taking these medications    Details   meclizine 25 MG Oral Tab Take 1 tablet (25 mg total) by mouth 3 (three) times daily as needed., Normal, Disp-20 tablet, R-0

## 2025-03-04 LAB
ATRIAL RATE: 74 BPM
P AXIS: -25 DEGREES
P-R INTERVAL: 162 MS
Q-T INTERVAL: 366 MS
QRS DURATION: 82 MS
QTC CALCULATION (BEZET): 406 MS
R AXIS: 41 DEGREES
T AXIS: 12 DEGREES
VENTRICULAR RATE: 74 BPM

## 2025-03-07 DIAGNOSIS — F41.1 GAD (GENERALIZED ANXIETY DISORDER): ICD-10-CM

## 2025-03-07 RX ORDER — ALPRAZOLAM 0.5 MG
TABLET ORAL
Qty: 60 TABLET | Refills: 0 | Status: SHIPPED | OUTPATIENT
Start: 2025-03-07

## 2025-04-22 PROBLEM — D12.4 BENIGN NEOPLASM OF DESCENDING COLON: Status: ACTIVE | Noted: 2025-04-22

## 2025-04-22 PROBLEM — D12.2 BENIGN NEOPLASM OF ASCENDING COLON: Status: ACTIVE | Noted: 2025-04-22

## 2025-04-22 PROBLEM — R10.12 ABDOMINAL PAIN, LEFT UPPER QUADRANT: Status: ACTIVE | Noted: 2025-04-22

## 2025-04-22 PROBLEM — K22.11 ULCER OF ESOPHAGUS WITH BLEEDING: Status: ACTIVE | Noted: 2025-04-22

## 2025-04-22 PROBLEM — K25.0 ACUTE GASTRIC ULCER WITH HEMORRHAGE AND OBSTRUCTION: Status: ACTIVE | Noted: 2025-04-22

## 2025-04-22 PROBLEM — Z86.0101 PERSONAL HISTORY OF ADENOMATOUS AND SERRATED COLON POLYPS: Status: ACTIVE | Noted: 2025-04-22

## 2025-04-22 PROBLEM — R11.0 NAUSEA: Status: ACTIVE | Noted: 2025-04-22

## 2025-04-22 PROBLEM — R10.12 LEFT UPPER QUADRANT PAIN: Status: ACTIVE | Noted: 2025-04-22

## 2025-04-28 PROBLEM — D13.2: Status: ACTIVE | Noted: 2025-04-28

## 2025-05-21 ENCOUNTER — ANESTHESIA (OUTPATIENT)
Dept: ENDOSCOPY | Facility: HOSPITAL | Age: 65
End: 2025-05-21
Payer: COMMERCIAL

## 2025-05-21 ENCOUNTER — HOSPITAL ENCOUNTER (OUTPATIENT)
Facility: HOSPITAL | Age: 65
Setting detail: HOSPITAL OUTPATIENT SURGERY
Discharge: HOME | End: 2025-05-21
Attending: INTERNAL MEDICINE | Admitting: INTERNAL MEDICINE
Payer: COMMERCIAL

## 2025-05-21 ENCOUNTER — ANESTHESIA EVENT (OUTPATIENT)
Dept: ENDOSCOPY | Facility: HOSPITAL | Age: 65
End: 2025-05-21
Payer: COMMERCIAL

## 2025-05-21 VITALS
WEIGHT: 214 LBS | TEMPERATURE: 97 F | RESPIRATION RATE: 16 BRPM | SYSTOLIC BLOOD PRESSURE: 119 MMHG | HEIGHT: 71 IN | OXYGEN SATURATION: 98 % | HEART RATE: 66 BPM | DIASTOLIC BLOOD PRESSURE: 85 MMHG | BODY MASS INDEX: 29.96 KG/M2

## 2025-05-21 RX ORDER — NALOXONE HYDROCHLORIDE 0.4 MG/ML
0.08 INJECTION, SOLUTION INTRAMUSCULAR; INTRAVENOUS; SUBCUTANEOUS ONCE AS NEEDED
Status: DISCONTINUED | OUTPATIENT
Start: 2025-05-21 | End: 2025-05-21

## 2025-05-21 RX ORDER — ONDANSETRON 2 MG/ML
4 INJECTION INTRAMUSCULAR; INTRAVENOUS ONCE AS NEEDED
Status: DISCONTINUED | OUTPATIENT
Start: 2025-05-21 | End: 2025-05-21

## 2025-05-21 RX ORDER — SODIUM CHLORIDE, SODIUM LACTATE, POTASSIUM CHLORIDE, CALCIUM CHLORIDE 600; 310; 30; 20 MG/100ML; MG/100ML; MG/100ML; MG/100ML
INJECTION, SOLUTION INTRAVENOUS CONTINUOUS
Status: DISCONTINUED | OUTPATIENT
Start: 2025-05-21 | End: 2025-05-21

## 2025-05-21 RX ADMIN — SODIUM CHLORIDE, SODIUM LACTATE, POTASSIUM CHLORIDE, CALCIUM CHLORIDE: 600; 310; 30; 20 INJECTION, SOLUTION INTRAVENOUS at 07:43:00

## 2025-05-21 NOTE — ANESTHESIA POSTPROCEDURE EVALUATION
[facility]    Raffaele GARCIA [facility] Patient Status:  Hospital Outpatient Surgery   Age/Gender 64 year old male MRN [id number]   Location [facility] ENDOSCOPY [facility]IN [facility] Attending Eb Campos MD   Hosp Day # 0 PCP Jes Floyd MD       Anesthesia Post-op Note    ESOPHAGOGASTRODUODENOSCOPY (EGD),  UPPER ENDOSCOPIC ULTRASOUND (EUS)    Procedure Summary       Date: 05/21/25 Room / Location:  ENDOSCOPY 01 /  ENDOSCOPY    Anesthesia Start: 0743 Anesthesia Stop: 0801    Procedures:       ESOPHAGOGASTRODUODENOSCOPY (EGD), UPPER ENDOSCOPIC ULTRASOUND (EUS)      UPPER ENDOSCOPIC ULTRASOUND (EUS) Diagnosis:       Benign epithelial neoplasm of duodenum      Acute gastric ulcer without hemorrhage or perforation      Ulcer of esophagus with bleeding      LUQ pain      Nausea      (Duodenal Lipoma)    Surgeons: Eb Campos MD Anesthesiologist: Ehsan Burger MD    Anesthesia Type: MAC ASA Status: 2            Anesthesia Type: MAC    Vitals Value Taken Time   /79 05/21/25 08:01   Temp  05/21/25 08:01   Pulse 69 05/21/25 08:01   Resp 14 05/21/25 08:01   SpO2 96 % 05/21/25 08:01   Vitals shown include unfiled device data.        Patient Location: Endoscopy    Anesthesia Type: MAC    Airway Patency: patent    Postop Pain Control: adequate    Mental Status: mildly sedated but able to meaningfully participate in the post-anesthesia evaluation    Nausea/Vomiting: none    Cardiopulmonary/Hydration status: stable euvolemic    Complications: no apparent anesthesia related complications    Postop vital signs: stable    Comments: Report given to RN    Dental Exam: Unchanged from Preop    Patient to be discharged home when criteria met.

## 2025-05-21 NOTE — OPERATIVE REPORT
Raffaele Patton Patient Status:  Hospital Outpatient Surgery    8/10/1960 MRN [id number]   [facility] ENDOSCOPY [facility]IN [facility] Attending Eb Campos MD   Date 2025 PCP Jes Floyd MD     PREOPERATIVE DIAGNOSIS/INDICATION: Duodenal subepithelial lesion  POSTOPERTATIVE DIAGNOSIS: Duodenal lipoma  PROCEDURE PERFORMED: EUS/EGD  TIME OUT WAS PERFORMED    SEDATION: MAC sedation provided by General Anesthesia    INFORMED CONSENT: Risks, benefits and alternatives to the procedure were explained to the patient including but not limited to bleeding, infection, perforation, adverse drug reactions, pancreatitis and the need for hospitalization and surgery if this occurs, the patient understands and agrees to procedure.  PROCEDURE DESCRIPTION: The therapeutic side viewing echoendoscope was introduced into the patient’s mouth, hypo pharynx, esophagus, stomach and the first and second portion of the duodenum, straightening of the endoscope was performed to obtain a direct view of the major ampulla. Careful but limited examination of the above described areas was performed on withdrawal of the endoscope. The patient tolerated the procedure well and there were no immediate complications noted during the procedure, the patient was transported to the recovery area in stable condition.  FINDINGS:  ESOPHAGUS: Normal  GEJ 3 cm hiatal hernia, Hill grade 3  STOMACH: Normal  DUODENUM: 2nd portion 1 cm subepithelial lesion with normal overlying mucosa, positive pillow sig  ECHO: Imaging was performed through the esophagus, stomach and duodenum. The celiac axis and the left adrenal gland appear wnl, the visualized portions of the left hepatic lobe appear wnl, the visualized pancreatic parenchyma of the body and tail appear wnl, the confluence of the portal vein, superior mesenteric vein and splenic vein appear wnl, the duodenal lesion was identified as measuring 1 cm hyperechoic homogeneous arising from 3rd  echo layer or submucosa  RECOMMENDATIONS:   Post EUS precautions, watch for bleeding, infection, perforation, adverse drug reactions and pancreatitis.    Eb Campos MD  5/21/2025  7:55 AM

## 2025-05-21 NOTE — ANESTHESIA PREPROCEDURE EVALUATION
PRE-OP EVALUATION    Patient Name: Raffaele Patton    Admit Diagnosis: Benign epithelial neoplasm of duodenum [D13.2]  Acute gastric ulcer without hemorrhage or perforation [K25.3]  Ulcer of esophagus with bleeding [K22.11]  LUQ pain [R10.12]  Nausea [R11.0]    Pre-op Diagnosis: Benign epithelial neoplasm of duodenum [D13.2]  Acute gastric ulcer without hemorrhage or perforation [K25.3]  Ulcer of esophagus with bleeding [K22.11]  LUQ pain [R10.12]  Nausea [R11.0]    ESOPHAGOGASTRODUODENOSCOPY (EGD),  UPPER ENDOSCOPIC ULTRASOUND (EUS)    Anesthesia Procedure: ESOPHAGOGASTRODUODENOSCOPY (EGD), UPPER ENDOSCOPIC ULTRASOUND (EUS)  UPPER ENDOSCOPIC ULTRASOUND (EUS)    Surgeons and Role:     * Eb Campos MD - Primary    Pre-op vitals reviewed.  Temp: 97.2 °F (36.2 °C)  Pulse: 71  Resp: 18  BP: 135/95  SpO2: 98 %  Body mass index is 29.85 kg/m².    Current medications reviewed.  Hospital Medications:  Current Medications[1]    Outpatient Medications:   Prescriptions Prior to Admission[2]    Allergies: Patient has no known allergies.      Anesthesia Evaluation    Patient summary reviewed.    Anesthetic Complications  (-) history of anesthetic complications         GI/Hepatic/Renal      (+) GERD                           Cardiovascular        Exercise tolerance: good     MET: >4    (+) obesity  (+) hypertension       (-) past MI              (-) CHF  (-) angina              Endo/Other    Negative endo/other ROS.                              Pulmonary    Negative pulmonary ROS.  (-) asthma  (-) COPD            (-) sleep apnea       Neuro/Psych    Negative neuro/psych ROS.      (-) CVA   (-) TIA                         Past Surgical History[3]  Social Hx on file[4]  History   Drug Use No     Available pre-op labs reviewed.  Lab Results   Component Value Date    WBC 7.4 02/28/2025    RBC 5.32 02/28/2025    HGB 17.2 02/28/2025    HCT 48.3 02/28/2025    MCV 90.8 02/28/2025    MCH 32.3 02/28/2025    MCHC 35.6 02/28/2025     RDW 13.9 2025    .0 2025     Lab Results   Component Value Date     2025    K 3.7 2025     2025    CO2 24.0 2025    BUN 12 2025    CREATSERUM 1.03 2025     (H) 2025    CA 9.5 2025            Airway      Mallampati: II  Mouth opening: >3 FB    Neck ROM: full Cardiovascular    Cardiovascular exam normal.         Dental    Dentition appears grossly intact         Pulmonary    Pulmonary exam normal.                 Other findings              ASA: 2   Plan: MAC  NPO status verified and Patient does not meet NPO guidelines.          Plan/risks discussed with: patient                Present on Admission:  **None**             [1]    lactated ringers infusion   Intravenous Continuous   [2]   Medications Prior to Admission   Medication Sig Dispense Refill Last Dose/Taking    pantoprazole 40 MG Oral Tab EC Take 1 tablet (40 mg total) by mouth 2 (two) times daily before meals. 180 tablet 0 2025    ALPRAZOLAM 0.5 MG Oral Tab TAKE 1 TABLET BY MOUTH TWICE DAILY AS NEEDED 60 tablet 0 Past Month    lisinopril 20 MG Oral Tab Take 1 tablet (20 mg total) by mouth daily. 90 tablet 1 2025   [3]   Past Surgical History:  Procedure Laterality Date    Colonoscopy      Hernia surgery  2021    Repair ing hernia,5+y/o,reducibl     [4]   Social History  Socioeconomic History    Marital status:    Tobacco Use    Smoking status: Former     Current packs/day: 0.00     Average packs/day: 2.0 packs/day for 35.0 years (70.0 ttl pk-yrs)     Types: Cigarettes     Start date: 1978     Quit date: 1986     Years since quittin.4     Passive exposure: Past    Smokeless tobacco: Never   Vaping Use    Vaping status: Never Used   Substance and Sexual Activity    Alcohol use: Not Currently     Alcohol/week: 5.0 - 10.0 standard drinks of alcohol     Types: 5 - 10 Standard drinks or equivalent per week     Comment: OCCASION 12    Drug use:  No   Other Topics Concern    Caffeine Concern No    Exercise No    Seat Belt Yes    Special Diet No    Stress Concern No    Weight Concern Yes

## 2025-05-21 NOTE — DISCHARGE INSTRUCTIONS
Home Care Instructions for Gastroscopy and Endoscopic Ultrasound with Sedation    Diet:  - Resume your regular diet as tolerated unless otherwise instructed.  - Start with light meals to minimize bloating.  - Do not drink alcohol today.    Medication:  - If you have questions about resuming your normal medications, please contact your Primary Care Physician.    Activities:  - Take it easy today. Do not return to work today.  - Do not drive today.  - Do not operate any machinery today (including kitchen equipment).    Gastroscopy:  - You may have a sore throat for 2-3 days following the exam. This is normal. Gargling with warm salt water (1/2 tsp salt to 1 glass warm water) or using throat lozenges will help.  - If you experience any sharp pain in your neck, abdomen or chest, vomiting of blood, oral temperature over 100 degrees Fahrenheit, light-headedness or dizziness, or any other problems, contact your doctor.    **If unable to reach your doctor, please go to the [facility] Emergency Room**    - Your referring physician will receive a full report of your examination.  - If you do not hear from your doctor's office within two weeks of your biopsy, please call them for your results.    You may be able to see your laboratory results in [employer] between 4 and 7 business days.  In some cases, your physician may not have viewed the results before they are released to [employer].  If you have questions regarding your results contact the physician who ordered the test/exam by phone or via [employer] by choosing \"Ask a Medical Question.\"

## 2025-05-21 NOTE — H&P
[facility]  Pre-op H and P    Raffaele Patton Patient Status:  Hospital Outpatient Surgery    8/10/1960 MRN [id number]   Location [facility] Attending Eb Campos MD   Date 2025 PCP Jes Floyd MD     CC: cm submucosal nodule of the 2nd portion of the duodenum    History of Present Illness:  Raffaele Patton is a a(n) 64 year old male. cm submucosal nodule of the 2nd portion of the duodenum    History:  Past Medical History[1]  Past Surgical History[2]  Family History[3]   reports that he quit smoking about 39 years ago. His smoking use included cigarettes. He started smoking about 47 years ago. He has a 70 pack-year smoking history. He has been exposed to tobacco smoke. He has never used smokeless tobacco. He reports that he does not currently use alcohol after a past usage of about 5.0 - 10.0 standard drinks of alcohol per week. He reports that he does not use drugs.    Allergies:  Allergies[4]    Medications:  Current Hospital Medications[5]    Physical Exam:    Blood pressure (!) 135/95, pulse 71, temperature 97.2 °F (36.2 °C), temperature source Temporal, resp. rate 18, height 5' 11\" (1.803 m), weight 214 lb (97.1 kg), SpO2 98%.    General: Appears alert, oriented x3 and in no acute distress.  CV: Normal rate   Lungs: Normal effort   Skin: Warm and dry.  Laboratory Data:       Imaging:      Assessment/Plan/Procedure:  Problem List[6]    cm submucosal nodule of the 2nd portion of the duodenum    Plan:  EUS    Eb Campos MD  2025  7:44 AM       [1]   Past Medical History:   Abdominal hernia    Abdominal pain    Anxiety    Black stools    Blood in the stool    Cancer (HCC)    bladder    Constipation    Dizziness    Easy bruising    Esophageal reflux    Essential hypertension    Fatigue    Frequent UTI    H. pylori infection    Hemorrhoids    High blood pressure    History of stomach ulcers    Irregular bowel habits    Loss of appetite    Nausea     Uncomfortable fullness after meals   [2]   Past Surgical History:  Procedure Laterality Date    Colonoscopy      Hernia surgery  08/09/2021    Repair ing hernia,5+y/o,reducibl     [3]   Family History  Problem Relation Age of Onset    Other (Other) Father     Other (lung cancer) Father     Heart Disorder Mother     Other (Other) Mother     Other (osteoporosis) Mother     Heart Disease Mother         geraldine   [4] No Known Allergies  [5]   Current Facility-Administered Medications:     lactated ringers infusion, , Intravenous, Continuous  [6]   Patient Active Problem List  Diagnosis    Routine general medical examination at a health care facility    Benign essential HTN    Gastrointestinal hemorrhage, unspecified gastrointestinal hemorrhage type    Orthostatic hypotension    Dehydration    Metabolic acidosis    Lightheadedness    Ulcer of esophagus with bleeding    Acute gastric ulcer with hemorrhage and obstruction    Abdominal pain, left upper quadrant    Nausea    Benign neoplasm of ascending colon    Benign neoplasm of descending colon    Personal history of adenomatous and serrated colon polyps    Benign epithelial neoplasm of duodenum

## 2025-07-02 ENCOUNTER — LAB ENCOUNTER (OUTPATIENT)
Dept: LAB | Age: 65
End: 2025-07-02
Attending: FAMILY MEDICINE
Payer: COMMERCIAL

## 2025-07-02 DIAGNOSIS — Z13.228 SCREENING FOR ENDOCRINE, NUTRITIONAL, METABOLIC AND IMMUNITY DISORDER: ICD-10-CM

## 2025-07-02 DIAGNOSIS — K22.11 ULCER OF ESOPHAGUS WITH BLEEDING: ICD-10-CM

## 2025-07-02 DIAGNOSIS — Z13.29 SCREENING FOR ENDOCRINE, NUTRITIONAL, METABOLIC AND IMMUNITY DISORDER: ICD-10-CM

## 2025-07-02 DIAGNOSIS — Z13.21 SCREENING FOR ENDOCRINE, NUTRITIONAL, METABOLIC AND IMMUNITY DISORDER: ICD-10-CM

## 2025-07-02 DIAGNOSIS — Z13.0 SCREENING FOR ENDOCRINE, NUTRITIONAL, METABOLIC AND IMMUNITY DISORDER: ICD-10-CM

## 2025-07-02 LAB
BASOPHILS # BLD AUTO: 0.02 X10(3) UL (ref 0–0.2)
BASOPHILS NFR BLD AUTO: 0.4 %
EOSINOPHIL # BLD AUTO: 0.16 X10(3) UL (ref 0–0.7)
EOSINOPHIL NFR BLD AUTO: 3 %
ERYTHROCYTE [DISTWIDTH] IN BLOOD BY AUTOMATED COUNT: 12.6 %
EST. AVERAGE GLUCOSE BLD GHB EST-MCNC: 103 MG/DL (ref 68–126)
HBA1C MFR BLD: 5.2 % (ref ?–5.7)
HCT VFR BLD AUTO: 49 % (ref 39–53)
HGB BLD-MCNC: 16.7 G/DL (ref 13–17.5)
IMM GRANULOCYTES # BLD AUTO: 0.01 X10(3) UL (ref 0–1)
IMM GRANULOCYTES NFR BLD: 0.2 %
LYMPHOCYTES # BLD AUTO: 1.05 X10(3) UL (ref 1–4)
LYMPHOCYTES NFR BLD AUTO: 19.8 %
MCH RBC QN AUTO: 32.7 PG (ref 26–34)
MCHC RBC AUTO-ENTMCNC: 34.1 G/DL (ref 31–37)
MCV RBC AUTO: 96.1 FL (ref 80–100)
MONOCYTES # BLD AUTO: 0.44 X10(3) UL (ref 0.1–1)
MONOCYTES NFR BLD AUTO: 8.3 %
NEUTROPHILS # BLD AUTO: 3.62 X10 (3) UL (ref 1.5–7.7)
NEUTROPHILS # BLD AUTO: 3.62 X10(3) UL (ref 1.5–7.7)
NEUTROPHILS NFR BLD AUTO: 68.3 %
PLATELET # BLD AUTO: 226 10(3)UL (ref 150–450)
RBC # BLD AUTO: 5.1 X10(6)UL (ref 4.3–5.7)
WBC # BLD AUTO: 5.3 X10(3) UL (ref 4–11)

## 2025-07-02 PROCEDURE — 80061 LIPID PANEL: CPT | Performed by: FAMILY MEDICINE

## 2025-07-02 PROCEDURE — 80050 GENERAL HEALTH PANEL: CPT | Performed by: FAMILY MEDICINE

## 2025-07-02 PROCEDURE — 82728 ASSAY OF FERRITIN: CPT | Performed by: FAMILY MEDICINE

## 2025-07-02 PROCEDURE — 82306 VITAMIN D 25 HYDROXY: CPT | Performed by: FAMILY MEDICINE

## 2025-07-02 PROCEDURE — 83036 HEMOGLOBIN GLYCOSYLATED A1C: CPT | Performed by: FAMILY MEDICINE

## 2025-07-02 PROCEDURE — 83540 ASSAY OF IRON: CPT | Performed by: FAMILY MEDICINE

## 2025-07-02 PROCEDURE — 83550 IRON BINDING TEST: CPT | Performed by: FAMILY MEDICINE

## 2025-07-03 LAB
ALBUMIN SERPL-MCNC: 4.5 G/DL (ref 3.2–4.8)
ALBUMIN/GLOB SERPL: 1.5 {RATIO} (ref 1–2)
ALP LIVER SERPL-CCNC: 74 U/L (ref 45–117)
ALT SERPL-CCNC: 19 U/L (ref 10–49)
ANION GAP SERPL CALC-SCNC: 12 MMOL/L (ref 0–18)
AST SERPL-CCNC: 26 U/L (ref ?–34)
BILIRUB SERPL-MCNC: 1 MG/DL (ref 0.2–1.1)
BUN BLD-MCNC: 11 MG/DL (ref 9–23)
CALCIUM BLD-MCNC: 9.7 MG/DL (ref 8.7–10.6)
CHLORIDE SERPL-SCNC: 105 MMOL/L (ref 98–112)
CHOLEST SERPL-MCNC: 224 MG/DL (ref ?–200)
CO2 SERPL-SCNC: 23 MMOL/L (ref 21–32)
CREAT BLD-MCNC: 1.16 MG/DL (ref 0.7–1.3)
DEPRECATED HBV CORE AB SER IA-ACNC: 88 NG/ML (ref 50–336)
EGFRCR SERPLBLD CKD-EPI 2021: 70 ML/MIN/1.73M2 (ref 60–?)
FASTING PATIENT LIPID ANSWER: NO
FASTING STATUS PATIENT QL REPORTED: NO
GLOBULIN PLAS-MCNC: 3.1 G/DL (ref 2–3.5)
GLUCOSE BLD-MCNC: 89 MG/DL (ref 70–99)
HDLC SERPL-MCNC: 56 MG/DL (ref 40–59)
IRON SATN MFR SERPL: 59 % (ref 20–50)
IRON SERPL-MCNC: 188 UG/DL (ref 65–175)
LDLC SERPL CALC-MCNC: 133 MG/DL (ref ?–100)
NONHDLC SERPL-MCNC: 168 MG/DL (ref ?–130)
OSMOLALITY SERPL CALC.SUM OF ELEC: 289 MOSM/KG (ref 275–295)
POTASSIUM SERPL-SCNC: 4.4 MMOL/L (ref 3.5–5.1)
PROT SERPL-MCNC: 7.6 G/DL (ref 5.7–8.2)
SODIUM SERPL-SCNC: 140 MMOL/L (ref 136–145)
TOTAL IRON BINDING CAPACITY: 320 UG/DL (ref 250–425)
TRANSFERRIN SERPL-MCNC: 262 MG/DL (ref 215–365)
TRIGL SERPL-MCNC: 199 MG/DL (ref 30–149)
TSI SER-ACNC: 0.93 UIU/ML (ref 0.55–4.78)
VIT D+METAB SERPL-MCNC: 21.5 NG/ML (ref 30–100)
VLDLC SERPL CALC-MCNC: 36 MG/DL (ref 0–30)

## (undated) DIAGNOSIS — I10 HYPERTENSION, UNSPECIFIED TYPE: ICD-10-CM

## (undated) DIAGNOSIS — F41.1 GAD (GENERALIZED ANXIETY DISORDER): ICD-10-CM

## (undated) DEVICE — 40580 - THE PINK PAD - ADVANCED TRENDELENBURG POSITIONING KIT: Brand: 40580 - THE PINK PAD - ADVANCED TRENDELENBURG POSITIONING KIT

## (undated) DEVICE — MEGA SUTURECUT ND: Brand: ENDOWRIST

## (undated) DEVICE — SUTURE VLOC 90 2-0 9\" 2145

## (undated) DEVICE — 3M™ RED DOT™ MONITORING ELECTRODE WITH FOAM TAPE AND STICKY GEL, 50/BAG, 20/CASE, 72/PLT 2570: Brand: RED DOT™

## (undated) DEVICE — 1200CC GUARDIAN II: Brand: GUARDIAN

## (undated) DEVICE — BALLOON HEMOSTATIC EUS LINEAR

## (undated) DEVICE — KIT VLV 5 PC AIR H2O SUCT BX ENDOGATOR CONN

## (undated) DEVICE — SPONGE STICK WITH PVP-I: Brand: KENDALL

## (undated) DEVICE — COVER WAND RF DETECT

## (undated) DEVICE — STERILE POLYISOPRENE POWDER-FREE SURGICAL GLOVES: Brand: PROTEXIS

## (undated) DEVICE — ROBOTIC GENERAL: Brand: MEDLINE INDUSTRIES, INC.

## (undated) DEVICE — VISUALIZATION SYSTEM: Brand: CLEARIFY

## (undated) DEVICE — BITEBLOCK ENDOSCP 60FR MAXI STRP

## (undated) DEVICE — KIT CUSTOM ENDOPROCEDURE STERIS

## (undated) DEVICE — BLADELESS OBTURATOR: Brand: WECK VISTA

## (undated) DEVICE — SUTURE MONOCRYL 4-0 PS-2

## (undated) DEVICE — EXOFIN TISSUE ADHESIVE 1.0ML

## (undated) DEVICE — CANNULA SEAL

## (undated) DEVICE — CADIERE FORCEPS: Brand: ENDOWRIST

## (undated) DEVICE — ENDOPATH ULTRA VERESS INSUFFLATION NEEDLES WITH LUER LOCK CONNECTORS: Brand: ENDOPATH

## (undated) DEVICE — ARM DRAPE

## (undated) DEVICE — TIP COVER ACCESSORY

## (undated) DEVICE — 10FT COMBINED O2 DELIVERY/CO2 MONITORING. FILTER WITH MICROSTREAM TYPE LUER: Brand: DUAL ADULT NASAL CANNULA

## (undated) DEVICE — Device

## (undated) DEVICE — V2 SPECIMEN COLLECTION MANIFOLD KIT: Brand: NEPTUNE

## (undated) DEVICE — COLUMN DRAPE

## (undated) DEVICE — MONOPOLAR CURVED SCISSORS: Brand: ENDOWRIST

## (undated) NOTE — Clinical Note
[address]  [address]  Authorization for Surgical Operation and Procedure     Date:___________                                                                                                         Time:__________  1. I hereby authorize * Surgery not found *, my physician and his/her assistants (if applicable), which may include medical students, residents, and/or fellows, to perform the following surgical operation/ procedure and administer such anesthesia as may be determined necessary by my physician:  Operation/Procedure name (s)  on Raffaele Patton   2.   I recognize that during the surgical operation/procedure, unforeseen conditions may necessitate additional or different procedures than those listed above.  I, therefore, further authorize and request that the above-named surgeon, assistants, or designees perform such procedures as are, in their judgment, necessary and desirable.    3.   My surgeon/physician has discussed prior to my surgery the potential benefits, risks and side effects of this procedure; the likelihood of achieving goals; and potential problems that might occur during recuperation.  They also discussed reasonable alternatives to the procedure, including risks, benefits, and side effects related to the alternatives and risks related to not receiving this procedure.  I have had all my questions answered and I acknowledge that no guarantee has been made as to the result that may be obtained.    4.   Should the need arise during my operation/procedure, which includes change of level of care prior to discharge, I also consent to the administration of blood and/or blood products.  Further, I understand that despite careful testing and screening of blood or blood products by collecting agencies, I may still be subject to ill effects as a result of receiving a blood transfusion and/or blood products.  The following are some, but not all, of the potential  risks that can occur: fever and allergic reactions, hemolytic reactions, transmission of diseases such as Hepatitis, AIDS and Cytomegalovirus (CMV) and fluid overload.  In the event that I wish to have an autologous transfusion of my own blood, or a directed donor transfusion, I will discuss this with my physician.  Check only if Refusing Blood or Blood Products  I understand refusal of blood or blood products as deemed necessary by my physician may have serious consequences to my condition to include possible death. I hereby assume responsibility for my refusal and release the hospital, its personnel, and my physicians from any responsibility for the consequences of my refusal.          o  Refuse      5.   I authorize the use of any specimen, organs, tissues, body parts or foreign objects that may be removed from my body during the operation/procedure for diagnosis, research or teaching purposes and their subsequent disposal by hospital authorities.  I also authorize the release of specimen test results and/or written reports to my treating physician on the hospital medical staff or other referring or consulting physicians involved in my care, at the discretion of the Pathologist or my treating physician.    6.   I consent to the photographing or videotaping of the operations or procedures to be performed, including appropriate portions of my body for medical, scientific, or educational purposes, provided my identity is not revealed by the pictures or by descriptive texts accompanying them.  If the procedure has been photographed/videotaped, the surgeon will obtain the original picture, image, videotape or CD.  The hospital will not be responsible for storage, release or maintenance of the picture, image, tape or CD.    7.   I consent to the presence of a  or observers in the operating room as deemed necessary by my physician or their designees.    8.   I recognize that in the event my procedure  results in extended X-Ray/fluoroscopy time, I may develop a skin reaction.    9. If I have a Do Not Attempt Resuscitation (DNAR) order in place, that status will be suspended while in the operating room, procedural suite, and during the recovery period unless otherwise explicitly stated by me (or a person authorized to consent on my behalf). The surgeon or my attending physician will determine when the applicable recovery period ends for purposes of reinstating the DNAR order.  10. Patients having a sterilization procedure: I understand that if the procedure is successful the results will be permanent and it will therefore be impossible for me to inseminate, conceive, or bear children.  I also understand that the procedure is intended to result in sterility, although the result has not been guaranteed.   11. I acknowledge that my physician has explained sedation/analgesia administration to me including the risk and benefits I consent to the administration of sedation/analgesia as may be necessary or desirable in the judgment of my physician.    I CERTIFY THAT I HAVE READ AND FULLY UNDERSTAND THE ABOVE CONSENT TO OPERATION and/or OTHER PROCEDURE.    _________________________________________  __________________________________  Signature of Patient     Signature of Responsible Person         ___________________________________         Printed Name of Responsible Person           _________________________________                 Relationship to Patient  _________________________________________  ______________________________  Signature of Witness          Date  Time      Patient Name: Raffaele Patton     : 8/10/1960                 Printed: 2024     Medical Record #: [id number]                     Page 1 [address]  [address]    Consent for Anesthesia    1. IRaffaele agree to be cared for by an anesthesiologist, who is  specially trained to monitor me and give me medicine to put me to sleep or keep me comfortable during my procedure    I understand that my anesthesiologist is not an employee or agent of [facility] or [employer]. He or she works for [employer].    2. As the patient asking for anesthesia services, I agree to:  a. Allow the anesthesiologist (anesthesia doctor) to give me medicine and do additional procedures as necessary. Some examples are: Starting or using an “IV” to give me medicine, fluids or blood during my procedure, and having a breathing tube placed to help me breathe when I’m asleep (intubation). In the event that my heart stops working properly, I understand that my anesthesiologist will make every effort to sustain my life, unless otherwise directed by [facility] Do Not Resuscitate documents.  b. Tell my anesthesia doctor before my procedure:  i. If I am pregnant.  ii. The last time that I ate or drank.  iii. All of the medicines I take (including prescriptions, herbal supplements, and pills I can buy without a prescription (including street drugs/illegal medications). Failure to inform my anesthesiologist about these medicines may increase my risk of anesthetic complications.  iv. If I am allergic to anything or have had a reaction to anesthesia before.  3. I understand how the anesthesia medicine will help me (benefits).  4. I understand that with any type of anesthesia medicine there are risks:  a. The most common risks are: nausea, vomiting, sore throat, muscle soreness, damage to my eyes, mouth, or teeth (from breathing tube placement).  b. Rare risks include: remembering what happened during my procedure, allergic reactions to medications, injury to my airway, heart, lungs, vision, nerves, or muscles and in extremely rare instances death.  5. My doctor has explained to me other choices available to me for my care (alternatives).  6. Pregnant Patients  (“epidural”):  I understand that the risks of having an epidural (medicine given into my back to help control pain during labor), include itching, low blood pressure, difficulty urinating, headache or slowing of the baby’s heart. Very rare risks include infection, bleeding, seizure, irregular heart rhythms and nerve injury.  7. Regional Anesthesia (“spinal”, “epidural”, & “nerve blocks”):  I understand that rare but potential complications include headache, bleeding, infection, seizure, irregular heart rhythms, and nerve injury.    I can change my mind about having anesthesia services at any time before I get the medicine.    _____________________________________________________________________________  Patient (or Representative) Signature/Relationship to Patient  Date   Time    _____________________________________________________________________________   Name (if used)    Language/Organization   Time    _____________________________________________________________________________  Anesthesiologist Signature     Date   Time  I have discussed the procedure and information above with the patient (or patient’s representative) and answered their questions. The patient or their representative has agreed to have anesthesia services.    _____________________________________________________________________________  Witness        Date   Time  I have verified that the signature is that of the patient or patient’s representative, and that it was signed before the procedure  Patient Name: Raffaele Patton     : 8/10/1960                 Printed: 2024     Medical Record #: [id number]                     Page 2 of 2

## (undated) NOTE — Clinical Note
[address]  [address]  Authorization for Surgical Operation and Procedure     Date:___________                                                                                                         Time:__________  1. I hereby authorize * Surgery not found *, my physician and his/her assistants (if applicable), which may include medical students, residents, and/or fellows, to perform the following surgical operation/ procedure and administer such anesthesia as may be determined necessary by my physician:  Operation/Procedure name (s)  on Raffaele Patton   2.   I recognize that during the surgical operation/procedure, unforeseen conditions may necessitate additional or different procedures than those listed above.  I, therefore, further authorize and request that the above-named surgeon, assistants, or designees perform such procedures as are, in their judgment, necessary and desirable.    3.   My surgeon/physician has discussed prior to my surgery the potential benefits, risks and side effects of this procedure; the likelihood of achieving goals; and potential problems that might occur during recuperation.  They also discussed reasonable alternatives to the procedure, including risks, benefits, and side effects related to the alternatives and risks related to not receiving this procedure.  I have had all my questions answered and I acknowledge that no guarantee has been made as to the result that may be obtained.    4.   Should the need arise during my operation/procedure, which includes change of level of care prior to discharge, I also consent to the administration of blood and/or blood products.  Further, I understand that despite careful testing and screening of blood or blood products by collecting agencies, I may still be subject to ill effects as a result of receiving a blood transfusion and/or blood products.  The following are some, but not all, of the potential  risks that can occur: fever and allergic reactions, hemolytic reactions, transmission of diseases such as Hepatitis, AIDS and Cytomegalovirus (CMV) and fluid overload.  In the event that I wish to have an autologous transfusion of my own blood, or a directed donor transfusion, I will discuss this with my physician.  Check only if Refusing Blood or Blood Products  I understand refusal of blood or blood products as deemed necessary by my physician may have serious consequences to my condition to include possible death. I hereby assume responsibility for my refusal and release the hospital, its personnel, and my physicians from any responsibility for the consequences of my refusal.          o  Refuse      5.   I authorize the use of any specimen, organs, tissues, body parts or foreign objects that may be removed from my body during the operation/procedure for diagnosis, research or teaching purposes and their subsequent disposal by hospital authorities.  I also authorize the release of specimen test results and/or written reports to my treating physician on the hospital medical staff or other referring or consulting physicians involved in my care, at the discretion of the Pathologist or my treating physician.    6.   I consent to the photographing or videotaping of the operations or procedures to be performed, including appropriate portions of my body for medical, scientific, or educational purposes, provided my identity is not revealed by the pictures or by descriptive texts accompanying them.  If the procedure has been photographed/videotaped, the surgeon will obtain the original picture, image, videotape or CD.  The hospital will not be responsible for storage, release or maintenance of the picture, image, tape or CD.    7.   I consent to the presence of a  or observers in the operating room as deemed necessary by my physician or their designees.    8.   I recognize that in the event my procedure  results in extended X-Ray/fluoroscopy time, I may develop a skin reaction.    9. If I have a Do Not Attempt Resuscitation (DNAR) order in place, that status will be suspended while in the operating room, procedural suite, and during the recovery period unless otherwise explicitly stated by me (or a person authorized to consent on my behalf). The surgeon or my attending physician will determine when the applicable recovery period ends for purposes of reinstating the DNAR order.  10. Patients having a sterilization procedure: I understand that if the procedure is successful the results will be permanent and it will therefore be impossible for me to inseminate, conceive, or bear children.  I also understand that the procedure is intended to result in sterility, although the result has not been guaranteed.   11. I acknowledge that my physician has explained sedation/analgesia administration to me including the risk and benefits I consent to the administration of sedation/analgesia as may be necessary or desirable in the judgment of my physician.    I CERTIFY THAT I HAVE READ AND FULLY UNDERSTAND THE ABOVE CONSENT TO OPERATION and/or OTHER PROCEDURE.    _________________________________________  __________________________________  Signature of Patient     Signature of Responsible Person         ___________________________________         Printed Name of Responsible Person           _________________________________                 Relationship to Patient  _________________________________________  ______________________________  Signature of Witness          Date  Time      Patient Name: Raffaele Patton     : 8/10/1960                 Printed: 2024     Medical Record #: [id number]                     Page 1 [address]  [address]    Consent for Anesthesia    1. IRaffaele agree to be cared for by an anesthesiologist, who is  specially trained to monitor me and give me medicine to put me to sleep or keep me comfortable during my procedure    I understand that my anesthesiologist is not an employee or agent of [facility] or [employer]. He or she works for [employer].    2. As the patient asking for anesthesia services, I agree to:  a. Allow the anesthesiologist (anesthesia doctor) to give me medicine and do additional procedures as necessary. Some examples are: Starting or using an “IV” to give me medicine, fluids or blood during my procedure, and having a breathing tube placed to help me breathe when I’m asleep (intubation). In the event that my heart stops working properly, I understand that my anesthesiologist will make every effort to sustain my life, unless otherwise directed by [facility] Do Not Resuscitate documents.  b. Tell my anesthesia doctor before my procedure:  i. If I am pregnant.  ii. The last time that I ate or drank.  iii. All of the medicines I take (including prescriptions, herbal supplements, and pills I can buy without a prescription (including street drugs/illegal medications). Failure to inform my anesthesiologist about these medicines may increase my risk of anesthetic complications.  iv. If I am allergic to anything or have had a reaction to anesthesia before.  3. I understand how the anesthesia medicine will help me (benefits).  4. I understand that with any type of anesthesia medicine there are risks:  a. The most common risks are: nausea, vomiting, sore throat, muscle soreness, damage to my eyes, mouth, or teeth (from breathing tube placement).  b. Rare risks include: remembering what happened during my procedure, allergic reactions to medications, injury to my airway, heart, lungs, vision, nerves, or muscles and in extremely rare instances death.  5. My doctor has explained to me other choices available to me for my care (alternatives).  6. Pregnant Patients  (“epidural”):  I understand that the risks of having an epidural (medicine given into my back to help control pain during labor), include itching, low blood pressure, difficulty urinating, headache or slowing of the baby’s heart. Very rare risks include infection, bleeding, seizure, irregular heart rhythms and nerve injury.  7. Regional Anesthesia (“spinal”, “epidural”, & “nerve blocks”):  I understand that rare but potential complications include headache, bleeding, infection, seizure, irregular heart rhythms, and nerve injury.    I can change my mind about having anesthesia services at any time before I get the medicine.    _____________________________________________________________________________  Patient (or Representative) Signature/Relationship to Patient  Date   Time    _____________________________________________________________________________   Name (if used)    Language/Organization   Time    _____________________________________________________________________________  Anesthesiologist Signature     Date   Time  I have discussed the procedure and information above with the patient (or patient’s representative) and answered their questions. The patient or their representative has agreed to have anesthesia services.    _____________________________________________________________________________  Witness        Date   Time  I have verified that the signature is that of the patient or patient’s representative, and that it was signed before the procedure  Patient Name: Raffaele Patton     : 8/10/1960                 Printed: 2024     Medical Record #: [id number]                     Page 2 of 2

## (undated) NOTE — Clinical Note
[address]  [address]  Authorization for Surgical Operation and Procedure     Date:___________                                                                                                         Time:__________  1. I hereby authorize * Surgery not found *, my physician and his/her assistants (if applicable), which may include medical students, residents, and/or fellows, to perform the following surgical operation/ procedure and administer such anesthesia as may be determined necessary by my physician:  Operation/Procedure name (s)  on Raffaele Patton   2.   I recognize that during the surgical operation/procedure, unforeseen conditions may necessitate additional or different procedures than those listed above.  I, therefore, further authorize and request that the above-named surgeon, assistants, or designees perform such procedures as are, in their judgment, necessary and desirable.    3.   My surgeon/physician has discussed prior to my surgery the potential benefits, risks and side effects of this procedure; the likelihood of achieving goals; and potential problems that might occur during recuperation.  They also discussed reasonable alternatives to the procedure, including risks, benefits, and side effects related to the alternatives and risks related to not receiving this procedure.  I have had all my questions answered and I acknowledge that no guarantee has been made as to the result that may be obtained.    4.   Should the need arise during my operation/procedure, which includes change of level of care prior to discharge, I also consent to the administration of blood and/or blood products.  Further, I understand that despite careful testing and screening of blood or blood products by collecting agencies, I may still be subject to ill effects as a result of receiving a blood transfusion and/or blood products.  The following are some, but not all, of the potential  risks that can occur: fever and allergic reactions, hemolytic reactions, transmission of diseases such as Hepatitis, AIDS and Cytomegalovirus (CMV) and fluid overload.  In the event that I wish to have an autologous transfusion of my own blood, or a directed donor transfusion, I will discuss this with my physician.  Check only if Refusing Blood or Blood Products  I understand refusal of blood or blood products as deemed necessary by my physician may have serious consequences to my condition to include possible death. I hereby assume responsibility for my refusal and release the hospital, its personnel, and my physicians from any responsibility for the consequences of my refusal.          o  Refuse      5.   I authorize the use of any specimen, organs, tissues, body parts or foreign objects that may be removed from my body during the operation/procedure for diagnosis, research or teaching purposes and their subsequent disposal by hospital authorities.  I also authorize the release of specimen test results and/or written reports to my treating physician on the hospital medical staff or other referring or consulting physicians involved in my care, at the discretion of the Pathologist or my treating physician.    6.   I consent to the photographing or videotaping of the operations or procedures to be performed, including appropriate portions of my body for medical, scientific, or educational purposes, provided my identity is not revealed by the pictures or by descriptive texts accompanying them.  If the procedure has been photographed/videotaped, the surgeon will obtain the original picture, image, videotape or CD.  The hospital will not be responsible for storage, release or maintenance of the picture, image, tape or CD.    7.   I consent to the presence of a  or observers in the operating room as deemed necessary by my physician or their designees.    8.   I recognize that in the event my procedure  results in extended X-Ray/fluoroscopy time, I may develop a skin reaction.    9. If I have a Do Not Attempt Resuscitation (DNAR) order in place, that status will be suspended while in the operating room, procedural suite, and during the recovery period unless otherwise explicitly stated by me (or a person authorized to consent on my behalf). The surgeon or my attending physician will determine when the applicable recovery period ends for purposes of reinstating the DNAR order.  10. Patients having a sterilization procedure: I understand that if the procedure is successful the results will be permanent and it will therefore be impossible for me to inseminate, conceive, or bear children.  I also understand that the procedure is intended to result in sterility, although the result has not been guaranteed.   11. I acknowledge that my physician has explained sedation/analgesia administration to me including the risk and benefits I consent to the administration of sedation/analgesia as may be necessary or desirable in the judgment of my physician.    I CERTIFY THAT I HAVE READ AND FULLY UNDERSTAND THE ABOVE CONSENT TO OPERATION and/or OTHER PROCEDURE.    _________________________________________  __________________________________  Signature of Patient     Signature of Responsible Person         ___________________________________         Printed Name of Responsible Person           _________________________________                 Relationship to Patient  _________________________________________  ______________________________  Signature of Witness          Date  Time      Patient Name: Raffaele Patton     : 8/10/1960                 Printed: 2024     Medical Record #: [id number]                     Page 1 [address]  [address]    Consent for Anesthesia    1. IRaffaele agree to be cared for by an anesthesiologist, who is  specially trained to monitor me and give me medicine to put me to sleep or keep me comfortable during my procedure    I understand that my anesthesiologist is not an employee or agent of [facility] or [employer]. He or she works for [employer].    2. As the patient asking for anesthesia services, I agree to:  a. Allow the anesthesiologist (anesthesia doctor) to give me medicine and do additional procedures as necessary. Some examples are: Starting or using an “IV” to give me medicine, fluids or blood during my procedure, and having a breathing tube placed to help me breathe when I’m asleep (intubation). In the event that my heart stops working properly, I understand that my anesthesiologist will make every effort to sustain my life, unless otherwise directed by [facility] Do Not Resuscitate documents.  b. Tell my anesthesia doctor before my procedure:  i. If I am pregnant.  ii. The last time that I ate or drank.  iii. All of the medicines I take (including prescriptions, herbal supplements, and pills I can buy without a prescription (including street drugs/illegal medications). Failure to inform my anesthesiologist about these medicines may increase my risk of anesthetic complications.  iv. If I am allergic to anything or have had a reaction to anesthesia before.  3. I understand how the anesthesia medicine will help me (benefits).  4. I understand that with any type of anesthesia medicine there are risks:  a. The most common risks are: nausea, vomiting, sore throat, muscle soreness, damage to my eyes, mouth, or teeth (from breathing tube placement).  b. Rare risks include: remembering what happened during my procedure, allergic reactions to medications, injury to my airway, heart, lungs, vision, nerves, or muscles and in extremely rare instances death.  5. My doctor has explained to me other choices available to me for my care (alternatives).  6. Pregnant Patients  (“epidural”):  I understand that the risks of having an epidural (medicine given into my back to help control pain during labor), include itching, low blood pressure, difficulty urinating, headache or slowing of the baby’s heart. Very rare risks include infection, bleeding, seizure, irregular heart rhythms and nerve injury.  7. Regional Anesthesia (“spinal”, “epidural”, & “nerve blocks”):  I understand that rare but potential complications include headache, bleeding, infection, seizure, irregular heart rhythms, and nerve injury.    I can change my mind about having anesthesia services at any time before I get the medicine.    _____________________________________________________________________________  Patient (or Representative) Signature/Relationship to Patient  Date   Time    _____________________________________________________________________________   Name (if used)    Language/Organization   Time    _____________________________________________________________________________  Anesthesiologist Signature     Date   Time  I have discussed the procedure and information above with the patient (or patient’s representative) and answered their questions. The patient or their representative has agreed to have anesthesia services.    _____________________________________________________________________________  Witness        Date   Time  I have verified that the signature is that of the patient or patient’s representative, and that it was signed before the procedure  Patient Name: Raffaele Patton     : 8/10/1960                 Printed: 2024     Medical Record #: [id number]                     Page 2 of 2

## (undated) NOTE — Clinical Note
[address]  [address]  Authorization for Surgical Operation and Procedure     Date:___________                                                                                                         Time:__________  1. I hereby authorize * Surgery not found *, my physician and his/her assistants (if applicable), which may include medical students, residents, and/or fellows, to perform the following surgical operation/ procedure and administer such anesthesia as may be determined necessary by my physician:  Operation/Procedure name (s)  on Raffaele Patton   2.   I recognize that during the surgical operation/procedure, unforeseen conditions may necessitate additional or different procedures than those listed above.  I, therefore, further authorize and request that the above-named surgeon, assistants, or designees perform such procedures as are, in their judgment, necessary and desirable.    3.   My surgeon/physician has discussed prior to my surgery the potential benefits, risks and side effects of this procedure; the likelihood of achieving goals; and potential problems that might occur during recuperation.  They also discussed reasonable alternatives to the procedure, including risks, benefits, and side effects related to the alternatives and risks related to not receiving this procedure.  I have had all my questions answered and I acknowledge that no guarantee has been made as to the result that may be obtained.    4.   Should the need arise during my operation/procedure, which includes change of level of care prior to discharge, I also consent to the administration of blood and/or blood products.  Further, I understand that despite careful testing and screening of blood or blood products by collecting agencies, I may still be subject to ill effects as a result of receiving a blood transfusion and/or blood products.  The following are some, but not all, of the potential  risks that can occur: fever and allergic reactions, hemolytic reactions, transmission of diseases such as Hepatitis, AIDS and Cytomegalovirus (CMV) and fluid overload.  In the event that I wish to have an autologous transfusion of my own blood, or a directed donor transfusion, I will discuss this with my physician.  Check only if Refusing Blood or Blood Products  I understand refusal of blood or blood products as deemed necessary by my physician may have serious consequences to my condition to include possible death. I hereby assume responsibility for my refusal and release the hospital, its personnel, and my physicians from any responsibility for the consequences of my refusal.          o  Refuse      5.   I authorize the use of any specimen, organs, tissues, body parts or foreign objects that may be removed from my body during the operation/procedure for diagnosis, research or teaching purposes and their subsequent disposal by hospital authorities.  I also authorize the release of specimen test results and/or written reports to my treating physician on the hospital medical staff or other referring or consulting physicians involved in my care, at the discretion of the Pathologist or my treating physician.    6.   I consent to the photographing or videotaping of the operations or procedures to be performed, including appropriate portions of my body for medical, scientific, or educational purposes, provided my identity is not revealed by the pictures or by descriptive texts accompanying them.  If the procedure has been photographed/videotaped, the surgeon will obtain the original picture, image, videotape or CD.  The hospital will not be responsible for storage, release or maintenance of the picture, image, tape or CD.    7.   I consent to the presence of a  or observers in the operating room as deemed necessary by my physician or their designees.    8.   I recognize that in the event my procedure  results in extended X-Ray/fluoroscopy time, I may develop a skin reaction.    9. If I have a Do Not Attempt Resuscitation (DNAR) order in place, that status will be suspended while in the operating room, procedural suite, and during the recovery period unless otherwise explicitly stated by me (or a person authorized to consent on my behalf). The surgeon or my attending physician will determine when the applicable recovery period ends for purposes of reinstating the DNAR order.  10. Patients having a sterilization procedure: I understand that if the procedure is successful the results will be permanent and it will therefore be impossible for me to inseminate, conceive, or bear children.  I also understand that the procedure is intended to result in sterility, although the result has not been guaranteed.   11. I acknowledge that my physician has explained sedation/analgesia administration to me including the risk and benefits I consent to the administration of sedation/analgesia as may be necessary or desirable in the judgment of my physician.    I CERTIFY THAT I HAVE READ AND FULLY UNDERSTAND THE ABOVE CONSENT TO OPERATION and/or OTHER PROCEDURE.    _________________________________________  __________________________________  Signature of Patient     Signature of Responsible Person         ___________________________________         Printed Name of Responsible Person           _________________________________                 Relationship to Patient  _________________________________________  ______________________________  Signature of Witness          Date  Time      Patient Name: Raffaele Patton     : 8/10/1960                 Printed: 2024     Medical Record #: [id number]                     Page 1 [address]  [address]    Consent for Anesthesia    1. IRaffaele agree to be cared for by an anesthesiologist, who is  specially trained to monitor me and give me medicine to put me to sleep or keep me comfortable during my procedure    I understand that my anesthesiologist is not an employee or agent of [facility] or [employer]. He or she works for [employer].    2. As the patient asking for anesthesia services, I agree to:  a. Allow the anesthesiologist (anesthesia doctor) to give me medicine and do additional procedures as necessary. Some examples are: Starting or using an “IV” to give me medicine, fluids or blood during my procedure, and having a breathing tube placed to help me breathe when I’m asleep (intubation). In the event that my heart stops working properly, I understand that my anesthesiologist will make every effort to sustain my life, unless otherwise directed by [facility] Do Not Resuscitate documents.  b. Tell my anesthesia doctor before my procedure:  i. If I am pregnant.  ii. The last time that I ate or drank.  iii. All of the medicines I take (including prescriptions, herbal supplements, and pills I can buy without a prescription (including street drugs/illegal medications). Failure to inform my anesthesiologist about these medicines may increase my risk of anesthetic complications.  iv. If I am allergic to anything or have had a reaction to anesthesia before.  3. I understand how the anesthesia medicine will help me (benefits).  4. I understand that with any type of anesthesia medicine there are risks:  a. The most common risks are: nausea, vomiting, sore throat, muscle soreness, damage to my eyes, mouth, or teeth (from breathing tube placement).  b. Rare risks include: remembering what happened during my procedure, allergic reactions to medications, injury to my airway, heart, lungs, vision, nerves, or muscles and in extremely rare instances death.  5. My doctor has explained to me other choices available to me for my care (alternatives).  6. Pregnant Patients  (“epidural”):  I understand that the risks of having an epidural (medicine given into my back to help control pain during labor), include itching, low blood pressure, difficulty urinating, headache or slowing of the baby’s heart. Very rare risks include infection, bleeding, seizure, irregular heart rhythms and nerve injury.  7. Regional Anesthesia (“spinal”, “epidural”, & “nerve blocks”):  I understand that rare but potential complications include headache, bleeding, infection, seizure, irregular heart rhythms, and nerve injury.    I can change my mind about having anesthesia services at any time before I get the medicine.    _____________________________________________________________________________  Patient (or Representative) Signature/Relationship to Patient  Date   Time    _____________________________________________________________________________   Name (if used)    Language/Organization   Time    _____________________________________________________________________________  Anesthesiologist Signature     Date   Time  I have discussed the procedure and information above with the patient (or patient’s representative) and answered their questions. The patient or their representative has agreed to have anesthesia services.    _____________________________________________________________________________  Witness        Date   Time  I have verified that the signature is that of the patient or patient’s representative, and that it was signed before the procedure  Patient Name: Raffaele Patton     : 8/10/1960                 Printed: 2024     Medical Record #: [id number]                     Page 2 of 2

## (undated) NOTE — LETTER
Patient Name: Jordy Wright  : 8/10/1960  MRN: [id number]  Patient Address: [address] [place] [id number]      Coronavirus Disease 2019 (COVID-19)     [facility] is committed to the safety and well-being of our patients, members carefully. If your symptoms get worse, call your healthcare provider immediately. 3. Get rest and stay hydrated.    4. If you have a medical appointment, call the healthcare provider ahead of time and tell them that you have or may have COVID-19.  5. For m of fever-reducing medications; and  · Improvement in respiratory symptoms (e.g., cough, shortness of breath); and  · At least 10 days have passed since symptoms first appeared OR if asymptomatic patient or date of symptom onset is unclear then use 10 days donors must:    · Have had a confirmed diagnosis of COVID-19  · Be symptom-free for at least 14 days*    *Some people will be required to have a repeat COVID-19 test in order to be eligible to donate.  If you’re instructed by [employer] that a repeat test is r random. Researchers are trying to identify similarities between people with a Post-COVID condition to better understand if there are risk factors. How do I prevent a Post-COVID condition?   The best way to prevent the long-term symptoms of COVID-19 is

## (undated) NOTE — LETTER
21    Patient: Vivian Rosenberg  : 8/10/1960 Visit date: 2021    Dear  Shauna Solorzano MD    Thank you for referring Vivian Rosenberg to my practice. Please find my assessment and plan below.        Assessment   Non-recurrent bilateral inguin

## (undated) NOTE — LETTER
OSR/CLAIRE Notification    To: Dr. Cesar Burris        Date:  8/4/2021  Fax #: [phone]  Patient Name: Martha Trejo / Sex: 8/10/1960-A: 61 y  male  Phone:  [phone]  CSN: [id number]        Medical Records: [id number]    Surgeon(s):  Surg

## (undated) NOTE — LETTER
Date & Time: 11/13/2021, 7:59 PM  Patient: Padilla Mcnally  Encounter Provider(s):    Nati Burch MD       To Whom It May Concern:    Jerald Bernheim was seen and treated in our department on 11/13/2021.  He should not return to work until [employer]